# Patient Record
Sex: MALE | Race: WHITE | Employment: OTHER | URBAN - METROPOLITAN AREA
[De-identification: names, ages, dates, MRNs, and addresses within clinical notes are randomized per-mention and may not be internally consistent; named-entity substitution may affect disease eponyms.]

---

## 2017-07-17 ENCOUNTER — OFFICE VISIT (OUTPATIENT)
Dept: SLEEP MEDICINE | Facility: CLINIC | Age: 72
End: 2017-07-17
Payer: MEDICARE

## 2017-07-17 VITALS
DIASTOLIC BLOOD PRESSURE: 85 MMHG | BODY MASS INDEX: 31.74 KG/M2 | HEART RATE: 74 BPM | OXYGEN SATURATION: 96 % | SYSTOLIC BLOOD PRESSURE: 132 MMHG | TEMPERATURE: 98.6 F | WEIGHT: 234 LBS

## 2017-07-17 DIAGNOSIS — G47.33 OSA (OBSTRUCTIVE SLEEP APNEA): Primary | ICD-10-CM

## 2017-07-17 PROCEDURE — 99213 OFFICE O/P EST LOW 20 MIN: CPT | Performed by: INTERNAL MEDICINE

## 2017-07-17 NOTE — NURSING NOTE
Chief Complaint   Patient presents with     CPAP Follow Up     Follow up jen       Initial There were no vitals taken for this visit. Estimated body mass index is 35.03 kg/(m^2) as calculated from the following:    Height as of 6/16/15: 1.829 m (6').    Weight as of 6/25/15: 117.2 kg (258 lb 4.8 oz).  Medication Reconciliation: complete   ESS 7/24  Ina Saxena MA

## 2017-07-17 NOTE — PATIENT INSTRUCTIONS

## 2017-07-17 NOTE — MR AVS SNAPSHOT
After Visit Summary   7/17/2017    Martell Coelho    MRN: [id number]           Patient Information     Date Of Birth          1945        Visit Information        Provider Department      7/17/2017 12:30 PM Constantin Woody MD [facility]        Today's Diagnoses     MURRAY (obstructive sleep apnea)    -  1      Care Instructions      Your BMI is There is no height or weight on file to calculate BMI.  Weight management is a personal decision.  If you are interested in exploring weight loss strategies, the following discussion covers the approaches that may be successful. Body mass index (BMI) is one way to tell whether you are at a healthy weight, overweight, or obese. It measures your weight in relation to your height.  A BMI of 18.5 to 24.9 is in the healthy range. A person with a BMI of 25 to 29.9 is considered overweight, and someone with a BMI of 30 or greater is considered obese. More than two-thirds of American adults are considered overweight or obese.  Being overweight or obese increases the risk for further weight gain. Excess weight may lead to heart disease and diabetes.  Creating and following plans for healthy eating and physical activity may help you improve your health.  Weight control is part of healthy lifestyle and includes exercise, emotional health, and healthy eating habits. Careful eating habits lifelong are the mainstay of weight control. Though there are significant health benefits from weight loss, long-term weight loss with diet alone may be very difficult to achieve- studies show long-term success with dietary management in less than 10% of people. Attaining a healthy weight may be especially difficult to achieve in those with severe obesity. In some cases, medications, devices and surgical management might be considered.  What can you do?  If you are overweight or obese and are interested in methods for weight loss, you should discuss this with your provider.      Consider reducing daily calorie intake by 500 calories.     Keep a food journal.     Avoiding skipping meals, consider cutting portions instead.    Diet combined with exercise helps maintain muscle while optimizing fat loss. Strength training is particularly important for building and maintaining muscle mass. Exercise helps reduce stress, increase energy, and improves fitness. Increasing exercise without diet control, however, may not burn enough calories to loose weight.       Start walking three days a week 10-20 minutes at a time    Work towards walking thirty minutes five days a week     Eventually, increase the speed of your walking for 1-2 minutes at time    In addition, we recommend that you review healthy lifestyles and methods for weight loss available through the National Institutes of Health patient information sites:  http://win.niddk.nih.gov/publications/index.htm    And look into health and wellness programs that may be available through your health insurance provider, employer, local community center, or laura club.    Weight management plan: Patient was referred to their PCP to discuss a diet and exercise plan.        Your There is no height or weight on file to calculate BMI.  Weight management is a personal decision.  If you are interested in exploring weight loss strategies, the following discussion covers the approaches that may be successful. Body mass index (BMI) is one way to tell whether you are at a healthy weight, overweight, or obese. It measures your weight in relation to your height.  A BMI of 18.5 to 24.9 is in the healthy range. A person with a BMI of 25 to 29.9 is considered overweight, and someone with a BMI of 30 or greater is considered obese. More than two-thirds of American adults are considered overweight or obese.  Being overweight or obese increases the risk for further weight gain. Excess weight may lead to heart disease and diabetes.  Creating and following plans for  healthy eating and physical activity may help you improve your health.  Weight control is part of healthy lifestyle and includes exercise, emotional health, and healthy eating habits. Careful eating habits lifelong are the mainstay of weight control. Though there are significant health benefits from weight loss, long-term weight loss with diet alone may be very difficult to achieve- studies show long-term success with dietary management in less than 10% of people. Attaining a healthy weight may be especially difficult to achieve in those with severe obesity. In some cases, medications, devices and surgical management might be considered.  What can you do?  If you are overweight or obese and are interested in methods for weight loss, you should discuss this with your provider.     Consider reducing daily calorie intake by 500 calories.     Keep a food journal.     Avoiding skipping meals, consider cutting portions instead.    Diet combined with exercise helps maintain muscle while optimizing fat loss. Strength training is particularly important for building and maintaining muscle mass. Exercise helps reduce stress, increase energy, and improves fitness. Increasing exercise without diet control, however, may not burn enough calories to loose weight.       Start walking three days a week 10-20 minutes at a time    Work towards walking thirty minutes five days a week     Eventually, increase the speed of your walking for 1-2 minutes at time    In addition, we recommend that you review healthy lifestyles and methods for weight loss available through the National Institutes of Health patient information sites:  http://win.niddk.nih.gov/publications/index.htm    And look into health and wellness programs that may be available through your health insurance provider, employer, local community center, or laura club.    Weight management plan: Patient was referred to their PCP to discuss a diet and exercise plan.             Follow-ups after your visit        Your next 10 appointments already scheduled     Jul 27, 2017  8:30 PM CDT   PSG Split with BED 1 SH SLEEP   [facility])    [address] [facility] [address]  [facility] [id number] [phone]            Aug 08, 2017  1:45 PM CDT   LAB with LAB ONC [facility] ([facility])    [address] [facility] [address] [phone]           Patient must bring picture ID.  Patient should be prepared to give a urine specimen  Please do not eat 10-12 hours before your appointment if you are coming in fasting for labs on lipids, cholesterol, or glucose (sugar).  Pregnant women should follow their Care Team instructions. Water with medications is okay. Do not drink coffee or other fluids.   If you have concerns about taking  your medications, please ask at office or if scheduling via [employer], send a message by clicking on Secure Messaging, Message Your Care Team.            Aug 08, 2017  2:30 PM CDT   Return Visit with Rula Mo MD   [facility])    [address] [facility] [address] [phone]            Aug 09, 2017 12:30 PM CDT   Return Sleep Patient with Constantin Woody MD   [facility])    [address] [id number] [phone]              Future tests that were ordered for you today     Open Future Orders        Priority Expected Expires Ordered    Comprehensive Sleep Study Routine  1/13/2018 7/17/2017            Who to contact     If you have questions or need follow up information about today's clinic visit or your schedule please contact [facility] directly at [phone].  Normal or non-critical lab and imaging results will be communicated to you by MyChart, letter or phone within 4 business days after the clinic has  received the results. If you do not hear from us within 7 days, please contact the clinic through [address] or phone. If you have a critical or abnormal lab result, we will notify you by phone as soon as possible.  Submit refill requests through [address] or call your pharmacy and they will forward the refill request to us. Please allow 3 business days for your refill to be completed.          Additional Information About Your Visit        [employer] Information     [address] gives you secure access to your electronic health record. If you see a primary care provider, you can also send messages to your care team and make appointments. If you have questions, please call your primary care clinic.  If you do not have a primary care provider, please call [phone] and they will assist you.        Care EveryWhere ID     This is your Care EveryWhere ID. This could be used by other organizations to access your [facility] medical records  [id number]         Blood Pressure from Last 3 Encounters:   06/25/15 116/80   06/16/15 128/78   11/11/13 128/76    Weight from Last 3 Encounters:   06/25/15 117.2 kg (258 lb 4.8 oz)   06/16/15 117.5 kg (259 lb)   11/11/13 117.9 kg (260 lb)               Primary Care Provider Office Phone # Fax #    Javier Erin Adrian [phone]       [facility] [address] [place] [address] [address]  [facility] [address]        Equal Access to Services     JOSE RHOADES AH: Hadii aad ku hadasho Soomaali, waaxda luqadaha, qaybta kaalmada adeegyada, paramjit antunez hayjerichon rodney lassiter. So [facility] [phone].    ATENCIÓN: Si habla español, tiene a louis disposición servicios gratuitos de asistencia lingüística. Geni al [phone].    We comply with applicable federal civil rights laws and [place] laws. We do not discriminate on the basis of race, color, national origin, age, disability sex, sexual orientation or gender identity.            Thank you!     Thank you for choosing [facility]  [facility]  for your care. Our goal is always to provide you with excellent care. Hearing back from our patients is one way we can continue to improve our services. Please take a few minutes to complete the written survey that you may receive in the mail after your visit with us. Thank you!             Your Updated Medication List - Protect others around you: Learn how to safely use, store and throw away your medicines at www.disposemymeds.org.          This list is accurate as of: 7/17/17  1:19 PM.  Always use your most recent med list.                   Brand Name Dispense Instructions for use Diagnosis    aspirin 81 MG tablet      Take  by mouth daily.        CRESTOR 20 MG tablet   Generic drug:  rosuvastatin      Take 20 mg by mouth daily.        nitroGLYcerin 0.4 MG/SPRAY spray    NITROLINGUAL     Place 1 spray under the tongue every 5 minutes as needed.        OMEGA-3 FISH OIL PO      Take 1 g by mouth daily        omeprazole 20 MG CR capsule    priLOSEC     Take 1 capsule by mouth daily.        UNABLE TO FIND      Take 2.5 mg by mouth daily. MEDICATION NAME: Nebivolol HCl.

## 2017-07-17 NOTE — PROGRESS NOTES
Obstructive Sleep Apnea - PAP Follow-Up Visit:    Chief Complaint   Patient presents with     CPAP Follow Up     Follow up jen       Martell Coelho comes in today for follow-up of their moderate sleep apnea, managed with CPAP. His sleep study from 08/23/2013 had shown an AHI of 22.3 per hour and a minimum saturation of 79%. Patient's weight was 265 lbs at the time of sleep study.     Patient has been using CPAP regularly. Last year, during a hospital admission for MI in [facility], his CPAP device was found to have malfunctioned. He obtained a replacement ( DeVilbiss) device. He used this device until having his Resmed device fixed.  Patient uses therapy regularly. However, some night he wakes up with air hunger and removes the mask interface,     He has lost about 30 lbs since his original sleep study. He uses CPAP regularly and does not know if his sleep apnea symptoms have changed.     He brought a download from his DeVilbiss device which shows a normal residual AHI of 0.7 per hour. An examination of his other device also shows normal residual AHI at 0.1 per hour.       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    Impression/Plan:    Moderate sleep apnea.     Patient has lost 30 lbs since his original sleep study. He has some intolerance with CPAP but has used therapy consistently. He wants to reassess his sleep apnea considering weight loss. I agreed to a repeat PSG to reassess his sleep apnea and titrate CPAP as needed      Plan:     1. Split night PSG fr reassessment of sleep apnea after stanislaw loss    Martell Coelho will follow up in about 2 week(s) after sleep study .      Fifteen minutes spent with patient, all of which were spent face-to-face counseling, consulting, coordinating plan of care.      Constantin Woody MD, MD    CC:  Javier Adrian,

## 2017-07-27 ENCOUNTER — THERAPY VISIT (OUTPATIENT)
Dept: SLEEP MEDICINE | Facility: CLINIC | Age: 72
End: 2017-07-27
Payer: MEDICARE

## 2017-07-27 DIAGNOSIS — G47.33 OSA (OBSTRUCTIVE SLEEP APNEA): ICD-10-CM

## 2017-07-27 PROCEDURE — 95810 POLYSOM 6/> YRS 4/> PARAM: CPT | Performed by: INTERNAL MEDICINE

## 2017-07-27 NOTE — MR AVS SNAPSHOT
After Visit Summary   7/27/2017    Martell Coelho    MRN: [id number]           Patient Information     Date Of Birth          1945        Visit Information        Provider Department      7/27/2017 8:30 PM BED 1  SLEEP [facility]        Today's Diagnoses     MURRAY (obstructive sleep apnea)           Follow-ups after your visit        Your next 10 appointments already scheduled     Aug 08, 2017  1:45 PM CDT   LAB with LAB ONC [facility] ([facility])    [address] [place] [id number] [phone]           Patient must bring picture ID. Patient should be prepared to give a urine specimen  Please do not eat 10-12 hours before your appointment if you are coming in fasting for labs on lipids, cholesterol, or glucose (sugar). Pregnant women should follow their Care Team instructions. Water with medications is okay. Do not drink coffee or other fluids. If you have concerns about taking  your medications, please ask at office or if scheduling via [employer], send a message by clicking on Secure Messaging, Message Your Care Team.            Aug 08, 2017  2:30 PM CDT   Return Visit with Rula Mo MD   [facility])    [address] [place] [id number] [phone]            Aug 09, 2017 12:30 PM CDT   Return Sleep Patient with Constantin Woody MD   [facility]    [address] [place] [id number] [phone]              Who to contact     If you have questions or need follow up information about today's clinic visit or your schedule please contact [facility] directly at [phone].  Normal or non-critical lab and imaging results will be communicated to you by MyChart, letter or phone within 4 business days after the clinic has received the results. If you do not hear from us  within 7 days, please contact the clinic through [facility] or phone. If you have a critical or abnormal lab result, we will notify you by phone as soon as possible.  Submit refill requests through [facility] or call your pharmacy and they will forward the refill request to us. Please allow 3 business days for your refill to be completed.          Additional Information About Your Visit        [employer] Information     [facility] gives you secure access to your electronic health record. If you see a primary care provider, you can also send messages to your care team and make appointments. If you have questions, please call your primary care clinic.  If you do not have a primary care provider, please call [phone] and they will assist you.        Care EveryWhere ID     This is your Care EveryWhere ID. This could be used by other organizations to access your [facility] medical records  [id number]         Blood Pressure from Last 3 Encounters:   07/17/17 132/85   06/25/15 116/80   06/16/15 128/78    Weight from Last 3 Encounters:   07/17/17 106.1 kg (234 lb)   06/25/15 117.2 kg (258 lb 4.8 oz)   06/16/15 117.5 kg (259 lb)              We Performed the Following     Comprehensive Sleep Study        Primary Care Provider Office Phone # Fax #    Javier Erin Adrian [phone]       [facility] [address]   [facility] [address]        Equal Access to Services     [facility] Rosa[facility] : Hadii aad ku hadasho Soomaali, waaxda luqadaha, qaybta kaalmada adeegyada, paramjit antunez hayaidee christianson . So [facility] [phone].    ATENCIÓN: Si habla español, tiene a louis disposición servicios gratuitos de asistencia lingüística. Llame al [phone].    We comply with applicable federal civil rights laws and [place] laws. We do not discriminate on the basis of race, color, national origin, age, disability sex, sexual orientation or gender identity.            Thank you!     Thank you for choosing [facility]  for your care. Our goal is always to provide you with excellent care. Hearing back from our patients is one way we can continue to improve our services. Please take a few minutes to complete the written survey that you may receive in the mail after your visit with us. Thank you!             Your Updated Medication List - Protect others around you: Learn how to safely use, store and throw away your medicines at www.disposemymeds.org.          This list is accurate as of: 7/27/17 11:59 PM.  Always use your most recent med list.                   Brand Name Dispense Instructions for use Diagnosis    aspirin 81 MG tablet      Take  by mouth daily.        CRESTOR 20 MG tablet   Generic drug:  rosuvastatin      Take 20 mg by mouth daily.        nitroGLYcerin 0.4 MG/SPRAY spray    NITROLINGUAL     Place 1 spray under the tongue every 5 minutes as needed.        OMEGA-3 FISH OIL PO      Take 1 g by mouth daily        omeprazole 20 MG CR capsule    priLOSEC     Take 1 capsule by mouth daily.        UNABLE TO FIND      Take 2.5 mg by mouth daily. MEDICATION NAME: Nebivolol HCl.

## 2017-07-28 NOTE — PROGRESS NOTES
Diagnostic PSG completed per provider order.  Patient did not meet the lab initial criteria for PAP therapy.

## 2017-08-04 ENCOUNTER — DOCUMENTATION ONLY (OUTPATIENT)
Dept: LAB | Facility: CLINIC | Age: 72
End: 2017-08-04

## 2017-08-04 NOTE — PROCEDURES
SLEEP STUDY INTERPRETATION  POLYSOMNOGRAPHY REPORT      Patient: Martell Coelho  YOB: 1945  Study Date: 7/27/2017  MRN: [id number]  Referring Provider: MD Adrian Lawrence  Ordering Provider: MD Woody Rakesh    Indications for Polysomnography: The patient is a 71 y old Male who is 6' and weighs 258.0 lbs.  His BMI is 35.3, Geneva sleepiness scale 7.0 and neck size is 0.0.  Relevant medical history includes coronary artery disease, moderate MURRAY on CPAP . A diagnostic polysomnogram was performed to evaluate for reassessment of sleep apnea after weight loss.    Polysomnogram Data:  A full night polysomnogram recorded the standard physiologic parameters including EEG, EOG, EMG, ECG, nasal and oral airflow.  Respiratory parameters of chest and abdominal movements were recorded with respiratory inductance plethysmography.  Oxygen saturation was recorded by pulse oximetry.      Sleep Architecture: Fragmented sleep. Stage N3 was not obtained.   The total recording time of the polysomnogram was 489.4 minutes.  The total sleep time was 424.0 minutes.  Sleep latency was normal at 15.8 minutes without the use of a sleep aid.  REM latency was 48.0 minutes.  Arousal index was increased at 29.9 arousals per hour.  Sleep efficiency was normal at 86.6%.  Wake after sleep onset was 48.5 minutes.  The patient spent 28.1% of total sleep time in Stage N1, 49.5% in Stage N2, 0.0% in Stages N3, and 22.4% in REM.  Time in REM supine was 16.0 minutes.    Respiration: Mild obstructive sleep apnea     Events - The polysomnogram revealed a presence of 1 obstructive, 1 central, and - mixed apneas resulting in an apnea index of 0.3 events per hour.  There were 49 hypopneas resulting in a hypopnea index of 6.9 events per hour.  The combined apnea/hypopnea index was 7.2 events per hour.  The REM AHI was 13.9 events per hour.  The supine AHI was 4.3 events per hour.  The RERA index was 0.7 events per hour.   The RDI was 7.9  events per hour.    Snoring - was reported as moderate to loud.    Respiratory rate and pattern - was notable for normal respiratory rate and pattern.    Sustained Sleep Associated Hypoventilation - Transcutaneous carbon dioxide monitoring was not used, however significant hypoventilation was not suggested by oximetry.    Sleep Associated Hypoxemia - (Greater than 5 minutes O2 sat below 89%) was present.  Baseline oxygen saturation was 92.7%. Lowest oxygen saturation was 83.5%.  Time spent less than or equal to 88% was 2.2 minutes.  Time spent less than or equal to 89% was 6.8 minutes.  7.9 0.7 7.2     Movement Activity: There was an increased frequency of periodic limb movements of sleep and associated EEG arousals.     Periodic Limb Activity - There were 788 PLMs during the entire study. The PLM index was 111.5 movements per hour.  The PLM Arousal Index was 14.0 per hour.    REM EMG Activity - Excessive transient / sustained muscle activity was not present.    Nocturnal Behavior - Abnormal sleep related behaviors were not noted during / arising out of NREM / REM sleep.      Bruxism - None apparent.    Cardiac Summary: Sinus rhythm with frequent PACs and PVCs.   The average pulse rate was 67.9 bpm.  The minimum pulse rate was 34.9 bpm while the maximum pulse rate was 92.4 bpm. The rhythm is normal sinus. Arrhythmias were noted.    Assessment:     This sleep study which was performed for a reevaluation of sleep apnea after weight loss shows mild obstructive sleep apnea.     Recommendations:    Depending on clinical indications, CPAP therapy can be continued or alternative therapy with dental device can be considered.    Patient may be a candidate for dental appliance through referral to Sleep Dentistry for the treatment of obstructive sleep apnea.    If devices are not acceptable or effective, patient may benefit from evaluation of possible surgical options.  If he is interested, would recommend referral to  specialized ENT-Sleep provider.    Weight management.    Pharmacologic therapy should be used for management of restless legs syndrome only if present and clinically indicated and not based on the presence of periodic limb movements alone.    Cardiac Comments: -  Diagnostic Codes:     Obstructive Sleep Apnea G47.33    Periodic Limb Movement Disorder G47.61    Repetitive Intrusions Into Sleep F51.8    Sleep Hypoxemia G47.36      _________________________________   Constantin Woody MD 08/02/2017

## 2017-08-08 ENCOUNTER — ONCOLOGY VISIT (OUTPATIENT)
Dept: ONCOLOGY | Facility: CLINIC | Age: 72
End: 2017-08-08
Payer: MEDICARE

## 2017-08-08 VITALS
HEIGHT: 72 IN | RESPIRATION RATE: 15 BRPM | HEART RATE: 69 BPM | DIASTOLIC BLOOD PRESSURE: 71 MMHG | TEMPERATURE: 98.6 F | OXYGEN SATURATION: 96 % | WEIGHT: 233 LBS | BODY MASS INDEX: 31.56 KG/M2 | SYSTOLIC BLOOD PRESSURE: 125 MMHG

## 2017-08-08 DIAGNOSIS — G47.33 OSA (OBSTRUCTIVE SLEEP APNEA): Primary | ICD-10-CM

## 2017-08-08 DIAGNOSIS — D75.1 POLYCYTHEMIA: ICD-10-CM

## 2017-08-08 DIAGNOSIS — E61.1 IRON DEFICIENCY: ICD-10-CM

## 2017-08-08 DIAGNOSIS — G47.33 OSA (OBSTRUCTIVE SLEEP APNEA): ICD-10-CM

## 2017-08-08 LAB
BASOPHILS # BLD AUTO: 0.1 10E9/L (ref 0–0.2)
BASOPHILS NFR BLD AUTO: 0.7 %
DIFFERENTIAL METHOD BLD: NORMAL
EOSINOPHIL # BLD AUTO: 0.2 10E9/L (ref 0–0.7)
EOSINOPHIL NFR BLD AUTO: 2.1 %
ERYTHROCYTE [DISTWIDTH] IN BLOOD BY AUTOMATED COUNT: 14.8 % (ref 10–15)
FERRITIN SERPL-MCNC: 16 NG/ML (ref 26–388)
HCT VFR BLD AUTO: 47.1 % (ref 40–53)
HGB BLD-MCNC: 16 G/DL (ref 13.3–17.7)
IRON SATN MFR SERPL: 25 % (ref 15–46)
IRON SERPL-MCNC: 103 UG/DL (ref 35–180)
LYMPHOCYTES # BLD AUTO: 1.4 10E9/L (ref 0.8–5.3)
LYMPHOCYTES NFR BLD AUTO: 19.3 %
MCH RBC QN AUTO: 28.9 PG (ref 26.5–33)
MCHC RBC AUTO-ENTMCNC: 34 G/DL (ref 31.5–36.5)
MCV RBC AUTO: 85 FL (ref 78–100)
MONOCYTES # BLD AUTO: 0.6 10E9/L (ref 0–1.3)
MONOCYTES NFR BLD AUTO: 9 %
NEUTROPHILS # BLD AUTO: 4.9 10E9/L (ref 1.6–8.3)
NEUTROPHILS NFR BLD AUTO: 68.9 %
PLATELET # BLD AUTO: 170 10E9/L (ref 150–450)
RBC # BLD AUTO: 5.53 10E12/L (ref 4.4–5.9)
TIBC SERPL-MCNC: 405 UG/DL (ref 240–430)
WBC # BLD AUTO: 7.2 10E9/L (ref 4–11)

## 2017-08-08 PROCEDURE — 36415 COLL VENOUS BLD VENIPUNCTURE: CPT | Performed by: INTERNAL MEDICINE

## 2017-08-08 PROCEDURE — 83540 ASSAY OF IRON: CPT | Performed by: INTERNAL MEDICINE

## 2017-08-08 PROCEDURE — 85025 COMPLETE CBC W/AUTO DIFF WBC: CPT | Performed by: INTERNAL MEDICINE

## 2017-08-08 PROCEDURE — 83550 IRON BINDING TEST: CPT | Performed by: INTERNAL MEDICINE

## 2017-08-08 PROCEDURE — 82728 ASSAY OF FERRITIN: CPT | Performed by: INTERNAL MEDICINE

## 2017-08-08 PROCEDURE — 99214 OFFICE O/P EST MOD 30 MIN: CPT | Performed by: INTERNAL MEDICINE

## 2017-08-08 ASSESSMENT — PAIN SCALES - GENERAL: PAINLEVEL: NO PAIN (0)

## 2017-08-08 NOTE — PROGRESS NOTES
Hematology Follow-up visit:  Date on this visit: 8/8/2017    Primary Care Physician: Marli Joyner.  Diagnosis: Erythrocytosis    History Of Present Illness:  Mr. Coelho is a 71 year old male who presents for f/up eythrocytosis. He has not followed up in over two years. He transferred his care to me in June 2015, due to Dr. Barber leaving our practice. He has originally met with Dr. Barber in 07/2013 when he was evaluated for erythrocytosis. He is originally from [facility] but his daughter lives in the  but he spents a fair amout of time here too. He reports that in 2013 in [facility] he was noted to have Hb ede 18-20 range and underwent three phlebotomies every other day, 500 cc removed each time. These were associated with lightheadedness. When he was seen by Dr. Barber his Hb was noraml at 16.2 g/dl, HCT of 46.2. WBC was 5.5 with normal absolute differential counts and platelet count was 182. LFTS were normal, ferritin was 39. TSH was normal, LDH was normal. PSA was 0.42 and DAVID 2 mutation analysis was negative. At that time he was also diagnosed with MURRAY and his erythrocytosis was felt to be secondary to MURRAY and no phlebotomy was recommended. However, he went back to [facility] and in 2015 he was still having monthly phlebotomies to keep his HCT down although it sounds like HCT was in the mid 40s there. When we saw him in June 2015, his serum erythropoietin level was normal at 27. Hb was 14.1 and ferritin was low at 11. CT abdomen 6/18/2015 showed colonic diverticulosis without inflammatory change and elevation of L hemidiaphragm. Adrenals, spleen and pancreas and liver appeared normal.He has a Hx of CAD, s/p PTCIx2. He is active and exercises daily and swims. In June 2015,  have recommended that he discontinue phlebotomy sessions in [facility] and have his CBCd checked annually. He reports having an EGD in [facility] in May 2016 when after the biopsy he had an UGI bleed. He required repeat EGD and cauterization of bleeding area,  per patient. His Hb and ferritin fell per patient, and he was given 5 infusions of Monofer in [facility], he believes 100 mg each, IV. He felt better following the infusions. He has not been back to the [place] for the past two years, until now.  He has a Hx of BPH, s/p TURP in [facility] and here has been following up with Dr. Infante. He followed with Dr. Infante's group in the last month per patient and had a prostate US.  PSA was normal per patient.  He is already on ASA 81 mg PO daily.   He has a Hx of chronic pancreatitis in the past. He has had a cholecystectomy in the past. He has occasional hard stools, but no diarrhea.   He had a colonoscopy on 8/3/2017 - which showed Diverticulosis of colon and repeat was recommended in 10 years. This was in [facility]. Outside medical records were reviewed.   He has a Hx of diverticulits, recurrent x3 in the past. He supplemens his diet with fiber.   He is a former smoker. He wanted to see if he is still iron deficient. He has remained on CPAP for sleep apnea. He reports he had another CPAP study last week and will be seeing a pulmonologist for results to see if he still needs CPAP.  His daughter lives in the [place].  In addition, a complete 12 point  review of systems is negative.    Past Medical/Surgical History:  CAD, s/p PTCI  MURRAY  Secondary erythrocytosis  BPH   FHx and Soc Hx reviewed      Past Surgical History:   Procedure Laterality Date     APPENDECTOMY       CYSTOSCOPY, TRANSURETHRAL RESECTION (TUR) PROSTATE, COMBINED     Cholecystectomy    Allergies:  Allergies as of 08/08/2017     (No Known Allergies)     Current Medications:  Current Outpatient Prescriptions   Medication Sig Dispense Refill     Omega-3 Fatty Acids (OMEGA-3 FISH OIL PO) Take 1 g by mouth daily       nitroglycerin (NITROLINGUAL) 0.4 MG/SPRAY spray Place 1 spray under the tongue every 5 minutes as needed.       omeprazole (PRILOSEC) 20 MG capsule Take 1 capsule by mouth daily.       UNABLE TO FIND  "Take 2.5 mg by mouth daily. MEDICATION NAME: Nebivolol HCl.        aspirin 81 MG tablet Take  by mouth daily.       rosuvastatin (CRESTOR) 20 MG tablet Take 20 mg by mouth daily.          Physical Exam:  /71  Pulse 69  Temp 98.6  F (37  C)  Resp 15  Ht 1.829 m (6' 0.01\")  Wt 105.7 kg (233 lb)  SpO2 96%  BMI 31.59 kg/m2      GENERAL APPEARANCE: healthy, alert and no distress     NECK: no adenopathy, no asymmetry or masses     RESP: lungs clear to auscultation - no rales, rhonchi or wheezes     CARDIOVASCULAR: regular rates and rhythm, normal S1 S2, no S3 or S4 and no murmur.     ABDOMEN:  soft, nontender, no HSM or masses and bowel sounds normal     MUSCULOSKELETAL: extremities normal- no gross deformities noted, no evidence of inflammation in joints, FROM in all extremities. No edema b/l LE.     SKIN: no suspicious lesions or rashes     PSYCHIATRIC: mentation appears normal and affect normal    Laboratory/Imaging Studies  Results for orders placed or performed in visit on 08/08/17   CBC with platelets differential   Result Value Ref Range    WBC 7.2 4.0 - 11.0 10e9/L    RBC Count 5.53 4.4 - 5.9 10e12/L    Hemoglobin 16.0 13.3 - 17.7 g/dL    Hematocrit 47.1 40.0 - 53.0 %    MCV 85 78 - 100 fl    MCH 28.9 26.5 - 33.0 pg    MCHC 34.0 31.5 - 36.5 g/dL    RDW 14.8 10.0 - 15.0 %    Platelet Count 170 150 - 450 10e9/L    Diff Method Automated Method     % Neutrophils 68.9 %    % Lymphocytes 19.3 %    % Monocytes 9.0 %    % Eosinophils 2.1 %    % Basophils 0.7 %    Absolute Neutrophil 4.9 1.6 - 8.3 10e9/L    Absolute Lymphocytes 1.4 0.8 - 5.3 10e9/L    Absolute Monocytes 0.6 0.0 - 1.3 10e9/L    Absolute Eosinophils 0.2 0.0 - 0.7 10e9/L    Absolute Basophils 0.1 0.0 - 0.2 10e9/L   Ferritin   Result Value Ref Range    Ferritin 16 (L) 26 - 388 ng/mL   Iron and iron binding capacity   Result Value Ref Range    Iron 103 35 - 180 ug/dL    Iron Binding Cap 405 240 - 430 ug/dL    Iron Saturation Index 25 15 - 46 % "     ASSESSMENT/PLAN:    Mr. 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 He has remained on  CPAP. Hb is normal but ferritin is low but he reports an UGI bleed in 2016 in [facility], followed by 5 doses of IV iron (Monofer, 100 mg each dose, per patient). He is feeling well presently.   1. Secondary erythrocytosis, resolved. Cont CPAP for sleep apnea and f/up with sleep medicine.  2. Iron deficiency- I recommended that he start on ferrous iron supplementation 325 mg PO at least daily for the next 6-12 months and that should normalize his iron stores. I would recommend he has his iron studies then repeated in about 6 months. We discussed potential GI side effects of oral iron including constipation, d/w patient. He is also considering a repeat course of IV iron when he returns to [facility]. He has been seeing a hematologist there.  3. BPH- f/up with urology.    At the end of our visit patient verbalized understanding and concurred with the plan.

## 2017-08-08 NOTE — MR AVS SNAPSHOT
After Visit Summary   8/8/2017    Martell Coelho    MRN: [id number]           Patient Information     Date Of Birth          1945        Visit Information        Provider Department      8/8/2017 2:30 PM Rula Mo MD [facility]        Today's Diagnoses     MURRAY (obstructive sleep apnea)    -  1    Iron deficiency           Follow-ups after your visit        Your next 10 appointments already scheduled     Aug 09, 2017 12:30 PM CDT   Return Sleep Patient with Constantin Woody MD   [facility])    [address] [place] [id number] [phone]              Who to contact     If you have questions or need follow up information about today's clinic visit or your schedule please contact [facility] directly at [phone].  Normal or non-critical lab and imaging results will be communicated to you by [employer], letter or phone within 4 business days after the clinic has received the results. If you do not hear from us within 7 days, please contact the clinic through [employer] or phone. If you have a critical or abnormal lab result, we will notify you by phone as soon as possible.  Submit refill requests through [employer] or call your pharmacy and they will forward the refill request to us. Please allow 3 business days for your refill to be completed.          Additional Information About Your Visit        [employer] Information     [employer] gives you secure access to your electronic health record. If you see a primary care provider, you can also send messages to your care team and make appointments. If you have questions, please call your primary care clinic.  If you do not have a primary care provider, please call [phone] and they will assist you.      [employer] is an electronic gateway that provides easy, online access to your medical records. With [employer], you can request a clinic appointment, read  "your test results, renew a prescription or communicate with your care team.     To access your existing account, please contact your [facility] or call [phone] for assistance.        Care EveryWhere ID     This is your Care EveryWhere ID. This could be used by other organizations to access your [facility] medical records  [id number]        Your Vitals Were     Pulse Temperature Respirations Height Pulse Oximetry BMI (Body Mass Index)    69 98.6  F (37  C) 15 1.829 m (6' 0.01\") 96% 31.59 kg/m2       Blood Pressure from Last 3 Encounters:   08/08/17 125/71   07/17/17 132/85   06/25/15 116/80    Weight from Last 3 Encounters:   08/08/17 105.7 kg (233 lb)   07/17/17 106.1 kg (234 lb)   06/25/15 117.2 kg (258 lb 4.8 oz)              Today, you had the following     No orders found for display       Primary Care Provider Office Phone # Fax #    Javier Erin Adrian [phone]       [facility] [address]   [facility] [address]        Equal Access to Services     MICHAEL RHOADES : Hadii aad ku hadasho Soomaali, waaxda luqadaha, qaybta kaalmada adeegyada, waxay idiin hayaan adeeg kharacleopatra la'aan ah. So [facility] [phone].    ATENCIÓN: Si habla español, tiene a louis disposición servicios gratuitos de asistencia lingüística. Llame al [phone].    We comply with applicable federal civil rights laws and [place] laws. We do not discriminate on the basis of race, color, national origin, age, disability sex, sexual orientation or gender identity.            Thank you!     Thank you for choosing [facility]  for your care. Our goal is always to provide you with excellent care. Hearing back from our patients is one way we can continue to improve our services. Please take a few minutes to complete the written survey that you may receive in the mail after your visit with us. Thank you!             Your Updated Medication List - Protect others around " you: Learn how to safely use, store and throw away your medicines at www.disposemymeds.org.          This list is accurate as of: 8/8/17  3:51 PM.  Always use your most recent med list.                   Brand Name Dispense Instructions for use Diagnosis    aspirin 81 MG tablet      Take  by mouth daily.        CRESTOR 20 MG tablet   Generic drug:  rosuvastatin      Take 20 mg by mouth daily.        MAGNESIUM OXIDE PO           nitroGLYcerin 0.4 MG/SPRAY spray    NITROLINGUAL     Place 1 spray under the tongue every 5 minutes as needed.        OMEGA-3 FISH OIL PO      Take 1 g by mouth daily    Polycythemia, MURRAY (obstructive sleep apnea)       omeprazole 20 MG CR capsule    priLOSEC     Take 1 capsule by mouth daily.        RANEXA PO           UNABLE TO FIND      Take 2.5 mg by mouth daily. MEDICATION NAME: Nebivolol HCl.

## 2017-08-08 NOTE — NURSING NOTE
"Oncology Rooming Note    August 8, 2017 2:32 PM   Martell Coelho is a 71 year old male who presents for:    Chief Complaint   Patient presents with     Oncology Clinic Visit     follow up     Initial Vitals: /71  Pulse 69  Temp 98.6  F (37  C)  Resp 15  Ht 1.829 m (6' 0.01\")  Wt 105.7 kg (233 lb)  SpO2 96%  BMI 31.59 kg/m2 Estimated body mass index is 31.59 kg/(m^2) as calculated from the following:    Height as of this encounter: 1.829 m (6' 0.01\").    Weight as of this encounter: 105.7 kg (233 lb). Body surface area is 2.32 meters squared.  No Pain (0) Comment: Data Unavailable   No LMP for male patient.  Allergies reviewed: Yes  Medications reviewed: Yes    Medications: Medication refills not needed today.  Pharmacy name entered into EPIC: Data Unavailable        5 minutes for nursing intake (face to face time)     Elizabeth Horner LPN              "

## 2017-08-09 ENCOUNTER — OFFICE VISIT (OUTPATIENT)
Dept: SLEEP MEDICINE | Facility: CLINIC | Age: 72
End: 2017-08-09
Payer: MEDICARE

## 2017-08-09 VITALS
HEIGHT: 71 IN | HEART RATE: 71 BPM | RESPIRATION RATE: 16 BRPM | DIASTOLIC BLOOD PRESSURE: 79 MMHG | BODY MASS INDEX: 32.87 KG/M2 | WEIGHT: 234.8 LBS | OXYGEN SATURATION: 98 % | SYSTOLIC BLOOD PRESSURE: 115 MMHG

## 2017-08-09 DIAGNOSIS — G47.33 OSA (OBSTRUCTIVE SLEEP APNEA): Primary | ICD-10-CM

## 2017-08-09 PROCEDURE — 99213 OFFICE O/P EST LOW 20 MIN: CPT | Performed by: INTERNAL MEDICINE

## 2017-08-09 NOTE — PROGRESS NOTES
Sleep Study Follow-Up Visit:    Date on this visit: 8/9/2017    Martell Coelho comes in today for follow-up of his sleep study done on 7/27/2017 at the [facility] for reassessment of sleep apnea after weight loss.    Sleep latency 15 minutes without Ambien.  REM achieved.   REM latency 48 minutes.  Sleep efficiency 86%. Total sleep time 424 minutes.    Sleep architecture:  Stage 1, 28% (5%), stage 2, 49.5% (45-55%), stage 3, 0% (15-20%), stage REM, 22.4% (20-25%).  AHI was 7.2, with desaturations down to 83%. RDI 7.9.  REM AHI 13.9, consistent with mild REM MURRAY.  Supine AHI 4.3, consistent with no SUPINE MURRAY.  Periodic Limb Movement Index 111.5/hour.       These findings were reviewed with patient.     Past medical/surgical history, family history, social history, medications and allergies were reviewed.      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 Obstructive sleep apnea     - Patient was counseled regarding current level of sleep apnea. Weight loss has decreased sleep apnea to a mild level compared to premorbid moderate sleep apnea.     - We discussed dental appliance therapy or continuing CPAP vs., no treatment. Patent decides to continue with CPAP.     - Patient was made aware of EKG abnormalities which he will follow up with cardiology.     - Patient denies restless leg symptoms and is not interested pharmacotherapy for PLMs.     Plan:     1. Continue auto PAP therapy     He will follow up with me in about 1 year(s).     Fifteen minutes spent with patient, all of which were spent face-to-face counseling, consulting, coordinating plan of care.      Constantin Woody,  MD    CC: Javier Adrian

## 2017-08-09 NOTE — NURSING NOTE
"Chief Complaint   Patient presents with     Study Results     psg f/u       Initial /79  Pulse 71  Resp 16  Ht 1.803 m (5' 11\")  Wt 106.5 kg (234 lb 12.8 oz)  SpO2 98%  BMI 32.75 kg/m2 Estimated body mass index is 32.75 kg/(m^2) as calculated from the following:    Height as of this encounter: 1.803 m (5' 11\").    Weight as of this encounter: 106.5 kg (234 lb 12.8 oz).  Medication Reconciliation: complete     ESS 2  Sara Tucker    "

## 2017-08-09 NOTE — MR AVS SNAPSHOT
After Visit Summary   8/9/2017    Martell Coelho    MRN: [id number]           Patient Information     Date Of Birth          1945        Visit Information        Provider Department      8/9/2017 12:30 PM Constantin Woody MD [facility]        Today's Diagnoses     MURRAY (obstructive sleep apnea)    -  1      Care Instructions      Your BMI is Body mass index is 32.75 kg/(m^2).  Weight management is a personal decision.  If you are interested in exploring weight loss strategies, the following discussion covers the approaches that may be successful. Body mass index (BMI) is one way to tell whether you are at a healthy weight, overweight, or obese. It measures your weight in relation to your height.  A BMI of 18.5 to 24.9 is in the healthy range. A person with a BMI of 25 to 29.9 is considered overweight, and someone with a BMI of 30 or greater is considered obese. More than two-thirds of American adults are considered overweight or obese.  Being overweight or obese increases the risk for further weight gain. Excess weight may lead to heart disease and diabetes.  Creating and following plans for healthy eating and physical activity may help you improve your health.  Weight control is part of healthy lifestyle and includes exercise, emotional health, and healthy eating habits. Careful eating habits lifelong are the mainstay of weight control. Though there are significant health benefits from weight loss, long-term weight loss with diet alone may be very difficult to achieve- studies show long-term success with dietary management in less than 10% of people. Attaining a healthy weight may be especially difficult to achieve in those with severe obesity. In some cases, medications, devices and surgical management might be considered.  What can you do?  If you are overweight or obese and are interested in methods for weight loss, you should discuss this with your provider.     Consider reducing  daily calorie intake by 500 calories.     Keep a food journal.     Avoiding skipping meals, consider cutting portions instead.    Diet combined with exercise helps maintain muscle while optimizing fat loss. Strength training is particularly important for building and maintaining muscle mass. Exercise helps reduce stress, increase energy, and improves fitness. Increasing exercise without diet control, however, may not burn enough calories to loose weight.       Start walking three days a week 10-20 minutes at a time    Work towards walking thirty minutes five days a week     Eventually, increase the speed of your walking for 1-2 minutes at time    In addition, we recommend that you review healthy lifestyles and methods for weight loss available through the National Institutes of Health patient information sites:  http://win.niddk.nih.gov/publications/index.htm    And look into health and wellness programs that may be available through your health insurance provider, employer, local community center, or laura club.    Weight management plan: Patient was referred to their PCP to discuss a diet and exercise plan.            Follow-ups after your visit        Who to contact     If you have questions or need follow up information about today's clinic visit or your schedule please contact [facility] directly at [phone].  Normal or non-critical lab and imaging results will be communicated to you by MyChart, letter or phone within 4 business days after the clinic has received the results. If you do not hear from us within 7 days, please contact the clinic through [employer] or phone. If you have a critical or abnormal lab result, we will notify you by phone as soon as possible.  Submit refill requests through [employer] or call your pharmacy and they will forward the refill request to us. Please allow 3 business days for your refill to be completed.          Additional Information About Your Visit       "  MyChart Information     [employer] gives you secure access to your electronic health record. If you see a primary care provider, you can also send messages to your care team and make appointments. If you have questions, please call your primary care clinic.  If you do not have a primary care provider, please call [phone] and they will assist you.        Care EveryWhere ID     This is your Care EveryWhere ID. This could be used by other organizations to access your [place] medical records  [id number]        Your Vitals Were     Pulse Respirations Height Pulse Oximetry BMI (Body Mass Index)       71 16 1.803 m (5' 11\") 98% 32.75 kg/m2        Blood Pressure from Last 3 Encounters:   08/09/17 115/79   08/08/17 125/71   07/17/17 132/85    Weight from Last 3 Encounters:   08/09/17 106.5 kg (234 lb 12.8 oz)   08/08/17 105.7 kg (233 lb)   07/17/17 106.1 kg (234 lb)              Today, you had the following     No orders found for display       Primary Care Provider Office Phone # Fax #    Javier Erin Adrian [phone]       [facility] [address]   [facility] [address]        Equal Access to Services     JOSE RHOADES : Hadii aad ku hadasho Soomaali, waaxda luqadaha, qaybta kaalmada adeegyada, waxay idiin hayaan ademedina galvan la'aidee lassiter. So [facility] [phone].    ATENCIÓN: Si habla español, tiene a louis disposición servicios gratuitos de asistencia lingüística. Llame al [phone].    We comply with applicable federal civil rights laws and [place] laws. We do not discriminate on the basis of race, color, national origin, age, disability sex, sexual orientation or gender identity.            Thank you!     Thank you for choosing [facility]  for your care. Our goal is always to provide you with excellent care. Hearing back from our patients is one way we can continue to improve our services. Please take a few minutes to complete the written survey that you may receive " in the mail after your visit with us. Thank you!             Your Updated Medication List - Protect others around you: Learn how to safely use, store and throw away your medicines at www.disposemymeds.org.          This list is accurate as of: 8/9/17 12:59 PM.  Always use your most recent med list.                   Brand Name Dispense Instructions for use Diagnosis    aspirin 81 MG tablet      Take  by mouth daily.        CRESTOR 20 MG tablet   Generic drug:  rosuvastatin      Take 20 mg by mouth daily.        MAGNESIUM OXIDE PO           nitroGLYcerin 0.4 MG/SPRAY spray    NITROLINGUAL     Place 1 spray under the tongue every 5 minutes as needed.        OMEGA-3 FISH OIL PO      Take 1 g by mouth daily    Polycythemia, MURRAY (obstructive sleep apnea)       omeprazole 20 MG CR capsule    priLOSEC     Take 1 capsule by mouth daily.        RANEXA PO           UNABLE TO FIND      Take 2.5 mg by mouth daily. MEDICATION NAME: Nebivolol HCl.

## 2017-08-09 NOTE — PATIENT INSTRUCTIONS

## 2017-08-11 ENCOUNTER — CARE COORDINATION (OUTPATIENT)
Dept: ONCOLOGY | Facility: CLINIC | Age: 72
End: 2017-08-11

## 2017-08-11 NOTE — PROGRESS NOTES
Patient called stating he is a patient of Dr. Mo's just recently seen in our clinic. He was planning to travel to [facility] in the next few days but has delayed his trip until September 9.  He states during his visit it was recommended that he have iron infusion while he was in [facility] (he has done this before) but now is asking if he should have it here prior to his departure.  Writer will discuss with MD and get back to patient with plan.

## 2017-08-15 NOTE — PROGRESS NOTES
Writer called patient with recommendations from Dr. Mo:  She does not feel he needs IV iron - she would recommend oral iron supplementation - Ferrous sulfate 325 mg daily.  However, patient states this makes him constipated and he probably will not use it. He was very appreciative of Dr. Mo's input.

## 2018-10-03 ENCOUNTER — OFFICE VISIT (OUTPATIENT)
Dept: SLEEP MEDICINE | Facility: CLINIC | Age: 73
End: 2018-10-03
Payer: MEDICARE

## 2018-10-03 VITALS
RESPIRATION RATE: 16 BRPM | HEIGHT: 70 IN | BODY MASS INDEX: 33.93 KG/M2 | OXYGEN SATURATION: 96 % | WEIGHT: 237 LBS | HEART RATE: 73 BPM | SYSTOLIC BLOOD PRESSURE: 125 MMHG | TEMPERATURE: 97.5 F | DIASTOLIC BLOOD PRESSURE: 85 MMHG

## 2018-10-03 DIAGNOSIS — G47.33 OSA (OBSTRUCTIVE SLEEP APNEA): Primary | ICD-10-CM

## 2018-10-03 PROCEDURE — 99213 OFFICE O/P EST LOW 20 MIN: CPT | Performed by: INTERNAL MEDICINE

## 2018-10-03 NOTE — NURSING NOTE
"Chief Complaint   Patient presents with     CPAP Follow Up       Initial /85  Pulse 73  Temp 97.5  F (36.4  C) (Oral)  Resp 16  Ht 1.778 m (5' 10\")  Wt 107.5 kg (237 lb)  SpO2 96%  BMI 34.01 kg/m2 Estimated body mass index is 34.01 kg/(m^2) as calculated from the following:    Height as of this encounter: 1.778 m (5' 10\").    Weight as of this encounter: 107.5 kg (237 lb).    Medication Reconciliation: complete     ESS   Sara Tucker      "

## 2018-10-03 NOTE — PATIENT INSTRUCTIONS

## 2018-10-03 NOTE — PROGRESS NOTES
"    Obstructive Sleep Apnea - PAP Follow-Up Visit:    Chief Complaint   Patient presents with     CPAP Follow Up       Martell Coelho comes in today for follow-up of their mild sleep apnea, managed with CPAP.     PSG from 7/27/2017 showed an AHI of 7.2 per hour. PSG in 2013 had shown AHI of 22.3 per hour.  Weight was 265 lbs and 258 lbs was     Overall, he rates the experience with PAP as 10 (0 poor, 10 great). The mask is comfortable. The mask is not leaking.  He is not snoring with the mask on. He is not having gasp arousals.  He is not having significant oral/nasal dryness. The pressure settings are comfortable.     He uses nasal mask.     Bedtime is typically 10 pm. Usually it takes about 10 minutes to fall asleep with the mask on. Wake time is typically 6 am.  Patient is using PAP therapy 7 hours per night. The patient is usually getting 7 hours of sleep per night.    He does feel rested in the morning.    No Data Recorded    ResMed     Auto-PAP 6-11 cmH2O download:  22/90 total days of use. 68 nonuse days. 16% days with >4 hours use.  Average use 4 hours 1 minutes per day. CPAP 95% pressure 10.5 cm. 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   Coronary artery disease     - Tolerating PAP well. Daytime symptoms are stable. Normal AHI noted on download. He is due for a replacement of his CPAP device ad I have placed an order for a new device. "     Plan:     1. Continue auto PAP therapy     Martell Coelho will follow up in about 1 year(s).     Fifteen minutes spent with patient, all of which were spent face-to-face counseling, consulting, coordinating plan of care.      Constantin Woody MD, MD    CC:  Javier Adrian,

## 2018-10-03 NOTE — MR AVS SNAPSHOT
After Visit Summary   10/3/2018    Martell Coelho    MRN: [id number]           Patient Information     Date Of Birth          1945        Visit Information        Provider Department      10/3/2018 1:30 PM Constantin Woody MD [facility] [place]        Today's Diagnoses     MURRAY (obstructive sleep apnea)    -  1      Care Instructions      Your BMI is Body mass index is 34.01 kg/(m^2).  Weight management is a personal decision.  If you are interested in exploring weight loss strategies, the following discussion covers the approaches that may be successful. Body mass index (BMI) is one way to tell whether you are at a healthy weight, overweight, or obese. It measures your weight in relation to your height.  A BMI of 18.5 to 24.9 is in the healthy range. A person with a BMI of 25 to 29.9 is considered overweight, and someone with a BMI of 30 or greater is considered obese. More than two-thirds of American adults are considered overweight or obese.  Being overweight or obese increases the risk for further weight gain. Excess weight may lead to heart disease and diabetes.  Creating and following plans for healthy eating and physical activity may help you improve your health.  Weight control is part of healthy lifestyle and includes exercise, emotional health, and healthy eating habits. Careful eating habits lifelong are the mainstay of weight control. Though there are significant health benefits from weight loss, long-term weight loss with diet alone may be very difficult to achieve- studies show long-term success with dietary management in less than 10% of people. Attaining a healthy weight may be especially difficult to achieve in those with severe obesity. In some cases, medications, devices and surgical management might be considered.  What can you do?  If you are overweight or obese and are interested in methods for weight loss, you should discuss this with your provider.     Consider  reducing daily calorie intake by 500 calories.     Keep a food journal.     Avoiding skipping meals, consider cutting portions instead.    Diet combined with exercise helps maintain muscle while optimizing fat loss. Strength training is particularly important for building and maintaining muscle mass. Exercise helps reduce stress, increase energy, and improves fitness. Increasing exercise without diet control, however, may not burn enough calories to loose weight.       Start walking three days a week 10-20 minutes at a time    Work towards walking thirty minutes five days a week     Eventually, increase the speed of your walking for 1-2 minutes at time    In addition, we recommend that you review healthy lifestyles and methods for weight loss available through the National Institutes of Health patient information sites:  http://win.niddk.nih.gov/publications/index.htm    And look into health and wellness programs that may be available through your health insurance provider, employer, local community center, or laura club.    Weight management plan: Patient was referred to their PCP to discuss a diet and exercise plan.        Your Body mass index is 34.01 kg/(m^2).  Weight management is a personal decision.  If you are interested in exploring weight loss strategies, the following discussion covers the approaches that may be successful. Body mass index (BMI) is one way to tell whether you are at a healthy weight, overweight, or obese. It measures your weight in relation to your height.  A BMI of 18.5 to 24.9 is in the healthy range. A person with a BMI of 25 to 29.9 is considered overweight, and someone with a BMI of 30 or greater is considered obese. More than two-thirds of American adults are considered overweight or obese.  Being overweight or obese increases the risk for further weight gain. Excess weight may lead to heart disease and diabetes.  Creating and following plans for healthy eating and physical activity  may help you improve your health.  Weight control is part of healthy lifestyle and includes exercise, emotional health, and healthy eating habits. Careful eating habits lifelong are the mainstay of weight control. Though there are significant health benefits from weight loss, long-term weight loss with diet alone may be very difficult to achieve- studies show long-term success with dietary management in less than 10% of people. Attaining a healthy weight may be especially difficult to achieve in those with severe obesity. In some cases, medications, devices and surgical management might be considered.  What can you do?  If you are overweight or obese and are interested in methods for weight loss, you should discuss this with your provider.     Consider reducing daily calorie intake by 500 calories.     Keep a food journal.     Avoiding skipping meals, consider cutting portions instead.    Diet combined with exercise helps maintain muscle while optimizing fat loss. Strength training is particularly important for building and maintaining muscle mass. Exercise helps reduce stress, increase energy, and improves fitness. Increasing exercise without diet control, however, may not burn enough calories to loose weight.       Start walking three days a week 10-20 minutes at a time    Work towards walking thirty minutes five days a week     Eventually, increase the speed of your walking for 1-2 minutes at time    In addition, we recommend that you review healthy lifestyles and methods for weight loss available through the National Institutes of Health patient information sites:  http://win.niddk.nih.gov/publications/index.htm    And look into health and wellness programs that may be available through your health insurance provider, employer, local community center, or laura club.    Weight management plan: Patient was referred to their PCP to discuss a diet and exercise plan.          Follow-ups after your visit        Your  next 10 appointments already scheduled     Oct 08, 2018 11:00 AM CDT   LAB with LAB ONC [facility] ([facility])    [address] [address] [place] [id number] [phone]           Please do not eat 10-12 hours before your appointment if you are coming in fasting for labs on lipids, cholesterol, or glucose (sugar). This does not apply to pregnant women. Water, hot tea and black coffee (with nothing added) are okay. Do not drink other fluids, diet soda or chew gum.            Oct 11, 2018  4:00 PM CDT   Return Visit with Rula Mo MD   [facility] ([facility])    [address] [address] [facility] [id number] [phone]              Who to contact     If you have questions or need follow up information about today's clinic visit or your schedule please contact [facility] directly at [phone].  Normal or non-critical lab and imaging results will be communicated to you by idiohart, letter or phone within 4 business days after the clinic has received the results. If you do not hear from us within 7 days, please contact the clinic through Estimotet or phone. If you have a critical or abnormal lab result, we will notify you by phone as soon as possible.  Submit refill requests through Hand Talk or call your pharmacy and they will forward the refill request to us. Please allow 3 business days for your refill to be completed.          Additional Information About Your Visit        Hand Talk Information     Hand Talk gives you secure access to your electronic health record. If you see a primary care provider, you can also send messages to your care team and make appointments. If you have questions, please call your primary care clinic.  If you do not have a primary care provider, please call [phone] and they will assist you.        Care EveryWhere ID     This is your Care EveryWhere ID. This could be used by  "other organizations to access your [place] medical records  [id number]        Your Vitals Were     Pulse Temperature Respirations Height Pulse Oximetry BMI (Body Mass Index)    73 97.5  F (36.4  C) (Oral) 16 1.778 m (5' 10\") 96% 34.01 kg/m2       Blood Pressure from Last 3 Encounters:   10/03/18 125/85   08/09/17 115/79   08/08/17 125/71    Weight from Last 3 Encounters:   10/03/18 107.5 kg (237 lb)   08/09/17 106.5 kg (234 lb 12.8 oz)   08/08/17 105.7 kg (233 lb)              We Performed the Following     Comprehensive DME        Primary Care Provider Office Phone # Fax #    Javier Erin Adrian [phone]       [facility] [address]   [facility] [address]        Equal Access to Services     JOSE RHOADES : Hadii aad ku hadasho Soomaali, waaxda luqadaha, qaybta kaalmada adeegyada, waxay idiin hayaan rodney christianson . So [facility] [phone].    ATENCIÓN: Si habla español, tiene a louis disposición servicios gratuitos de asistencia lingüística. Geni al [phone].    We comply with applicable federal civil rights laws and [place] laws. We do not discriminate on the basis of race, color, national origin, age, disability, sex, sexual orientation, or gender identity.            Thank you!     Thank you for choosing [facility]  for your care. Our goal is always to provide you with excellent care. Hearing back from our patients is one way we can continue to improve our services. Please take a few minutes to complete the written survey that you may receive in the mail after your visit with us. Thank you!             Your Updated Medication List - Protect others around you: Learn how to safely use, store and throw away your medicines at www.disposemymeds.org.          This list is accurate as of 10/3/18  1:57 PM.  Always use your most recent med list.                   Brand Name Dispense Instructions for use Diagnosis    aspirin 81 MG tablet      Take  by mouth " daily.        CRESTOR 20 MG tablet   Generic drug:  rosuvastatin      Take 20 mg by mouth daily.        MAGNESIUM OXIDE PO       Iron deficiency, MURRAY (obstructive sleep apnea)       nitroGLYcerin 0.4 MG/SPRAY spray    NITROLINGUAL     Place 1 spray under the tongue every 5 minutes as needed.        OMEGA-3 FISH OIL PO      Take 1 g by mouth daily    Polycythemia, MURRAY (obstructive sleep apnea)       omeprazole 20 MG CR capsule    priLOSEC     Take 1 capsule by mouth daily.        RANEXA PO       Iron deficiency, MURRAY (obstructive sleep apnea)       UNABLE TO FIND      Take 2.5 mg by mouth daily. MEDICATION NAME: Nebivolol HCl.

## 2018-10-08 DIAGNOSIS — G47.33 OSA (OBSTRUCTIVE SLEEP APNEA): ICD-10-CM

## 2018-10-08 DIAGNOSIS — E61.1 IRON DEFICIENCY: ICD-10-CM

## 2018-10-08 LAB
ERYTHROCYTE [DISTWIDTH] IN BLOOD BY AUTOMATED COUNT: 16.4 % (ref 10–15)
FERRITIN SERPL-MCNC: 10 NG/ML (ref 26–388)
HCT VFR BLD AUTO: 48.3 % (ref 40–53)
HGB BLD-MCNC: 15.3 G/DL (ref 13.3–17.7)
IRON SATN MFR SERPL: 16 % (ref 15–46)
IRON SERPL-MCNC: 66 UG/DL (ref 35–180)
MCH RBC QN AUTO: 27.1 PG (ref 26.5–33)
MCHC RBC AUTO-ENTMCNC: 31.7 G/DL (ref 31.5–36.5)
MCV RBC AUTO: 86 FL (ref 78–100)
PLATELET # BLD AUTO: 158 10E9/L (ref 150–450)
RBC # BLD AUTO: 5.64 10E12/L (ref 4.4–5.9)
TIBC SERPL-MCNC: 419 UG/DL (ref 240–430)
WBC # BLD AUTO: 7.5 10E9/L (ref 4–11)

## 2018-10-08 PROCEDURE — 85027 COMPLETE CBC AUTOMATED: CPT | Performed by: INTERNAL MEDICINE

## 2018-10-08 PROCEDURE — 82728 ASSAY OF FERRITIN: CPT | Performed by: INTERNAL MEDICINE

## 2018-10-08 PROCEDURE — 36415 COLL VENOUS BLD VENIPUNCTURE: CPT | Performed by: INTERNAL MEDICINE

## 2018-10-08 PROCEDURE — 83540 ASSAY OF IRON: CPT | Performed by: INTERNAL MEDICINE

## 2018-10-08 PROCEDURE — 83550 IRON BINDING TEST: CPT | Performed by: INTERNAL MEDICINE

## 2018-10-10 DIAGNOSIS — R05.3 PERSISTENT COUGH: Primary | ICD-10-CM

## 2018-10-11 ENCOUNTER — ONCOLOGY VISIT (OUTPATIENT)
Dept: ONCOLOGY | Facility: CLINIC | Age: 73
End: 2018-10-11
Payer: MEDICARE

## 2018-10-11 VITALS
TEMPERATURE: 98.6 F | RESPIRATION RATE: 18 BRPM | BODY MASS INDEX: 34.79 KG/M2 | OXYGEN SATURATION: 97 % | DIASTOLIC BLOOD PRESSURE: 79 MMHG | SYSTOLIC BLOOD PRESSURE: 117 MMHG | WEIGHT: 243 LBS | HEIGHT: 70 IN | HEART RATE: 74 BPM

## 2018-10-11 DIAGNOSIS — R53.83 OTHER FATIGUE: ICD-10-CM

## 2018-10-11 DIAGNOSIS — D75.1 POLYCYTHEMIA SECONDARY TO HYPOXIA: ICD-10-CM

## 2018-10-11 DIAGNOSIS — G47.33 OSA (OBSTRUCTIVE SLEEP APNEA): ICD-10-CM

## 2018-10-11 DIAGNOSIS — E61.1 IRON DEFICIENCY: Primary | ICD-10-CM

## 2018-10-11 DIAGNOSIS — K90.89 OTHER INTESTINAL MALABSORPTION: ICD-10-CM

## 2018-10-11 PROCEDURE — 99214 OFFICE O/P EST MOD 30 MIN: CPT | Performed by: INTERNAL MEDICINE

## 2018-10-11 ASSESSMENT — PAIN SCALES - GENERAL: PAINLEVEL: NO PAIN (0)

## 2018-10-11 NOTE — MR AVS SNAPSHOT
After Visit Summary   10/11/2018    Martell Coelho    MRN: [id number]           Patient Information     Date Of Birth          1945        Visit Information        Provider Department      10/11/2018 4:00 PM Rula Mo MD [facility]        Today's Diagnoses     Iron deficiency    -  1       Follow-ups after your visit        Your next 10 appointments already scheduled     Oct 19, 2018 12:30 PM CDT   Level 1 with [address] ([facility])    [address] [address] [place] [id number] [phone]            Dec 13, 2018  9:15 AM CST   XR CHEST 2 VIEWS with UCXR1   [facility] ([facility])    [address] [address] [phone]           How do I prepare for my exam? (Food and drink instructions) No Food and Drink Restrictions.  How do I prepare for my exam? (Other instructions) You do not need to do anything special for this exam.  What should I wear: Wear comfortable clothes.  How long does the exam take: Most scans take less than 5 minutes.  What should I bring: Bring a list of your medicines, including vitamins, minerals and over-the-counter drugs. It is safest to leave personal items at home.  Do I need a :  No  is needed.  What do I need to tell my doctor: Tell your doctor if there s any chance you are pregnant.  What should I do after the exam: No restrictions, You may resume normal activities.  What is this test: An image of a specific body part shown in shades of black and white.  Who should I call with questions: If you have any questions, please call the Imaging Department where you will have your exam. Directions, parking instructions, and other information is available on our website, [email or web address].            Dec 13, 2018  9:30 AM CST   FULL PULMONARY FUNCTION with UC PFL BYRON   [facility] Pulmonary Function  Testing ([facility])    [address]  3rd Floor  [facility] [address]   [phone]            Dec 13, 2018 10:40 AM CST   (Arrive by 10:25 AM)   New Patient Visit with John Cervantes MD   [facility] ([facility])    [address] [facility] [address]  [address]  [facility] [address]   [phone]            Ethan 10, 2019 11:00 AM CST   LAB with LAB ONC [facility] ([facility])    [address] [address] [place] [id number] [phone]           Please do not eat 10-12 hours before your appointment if you are coming in fasting for labs on lipids, cholesterol, or glucose (sugar). This does not apply to pregnant women. Water, hot tea and black coffee (with nothing added) are okay. Do not drink other fluids, diet soda or chew gum.            Ethan 15, 2019 12:30 PM CST   Return Visit with Rula Mo MD   [facility] ([facility])    [address] [place] [id number] [phone]              Future tests that were ordered for you today     Open Future Orders        Priority Expected Expires Ordered    Pulmonary function test Routine 10/10/2018 4/8/2019 10/10/2018    XR Chest 2 Views Routine 10/10/2018 4/8/2019 10/10/2018            Who to contact     If you have questions or need follow up information about today's clinic visit or your schedule please contact [facility] directly at [phone].  Normal or non-critical lab and imaging results will be communicated to you by MyChart, letter or phone within 4 business days after the clinic has received the results. If you do not hear from us within 7 days, please contact the clinic through MyChart or phone. If you have a critical or abnormal lab result, we will notify you by phone as soon as possible.  Submit refill requests through SynapSense or call your pharmacy  "and they will forward the refill request to us. Please allow 3 business days for your refill to be completed.          Additional Information About Your Visit        YouStickerharNealyWear Information     Acsendo gives you secure access to your electronic health record. If you see a primary care provider, you can also send messages to your care team and make appointments. If you have questions, please call your primary care clinic.  If you do not have a primary care provider, please call [phone] and they will assist you.      Acsendo is an electronic gateway that provides easy, online access to your medical records. With Acsendo, you can request a clinic appointment, read your test results, renew a prescription or communicate with your care team.     To access your existing account, please contact your [facility] or call [phone] for assistance.        Care EveryWhere ID     This is your Care EveryWhere ID. This could be used by other organizations to access your [place] medical records  [id number]        Your Vitals Were     Pulse Temperature Respirations Height Pulse Oximetry BMI (Body Mass Index)    74 98.6  F (37  C) 18 1.778 m (5' 10\") 97% 34.87 kg/m2       Blood Pressure from Last 3 Encounters:   10/11/18 117/79   10/03/18 125/85   08/09/17 115/79    Weight from Last 3 Encounters:   10/11/18 110.2 kg (243 lb)   10/03/18 107.5 kg (237 lb)   08/09/17 106.5 kg (234 lb 12.8 oz)              Today, you had the following     No orders found for display       Primary Care Provider Office Phone # Fax #    Javier Adrian [phone]       [facility] [address]   [facility] [address]        Equal Access to Services     JOSE RHOADES AH: Hadii ashly rene Solizzy, waaxda luqadaha, qaybta kaalmada adeegyada, paramjit lassiter. So [facility] [phone].    ATENCIÓN: Si habla español, tiene a louis disposición servicios gratuitos de " asistencia lingüística. Geni al [phone].    We comply with applicable federal civil rights laws and [place] laws. We do not discriminate on the basis of race, color, national origin, age, disability, sex, sexual orientation, or gender identity.            Thank you!     Thank you for choosing [facility]  for your care. Our goal is always to provide you with excellent care. Hearing back from our patients is one way we can continue to improve our services. Please take a few minutes to complete the written survey that you may receive in the mail after your visit with us. Thank you!             Your Updated Medication List - Protect others around you: Learn how to safely use, store and throw away your medicines at www.disposemymeds.org.          This list is accurate as of 10/11/18  4:28 PM.  Always use your most recent med list.                   Brand Name Dispense Instructions for use Diagnosis    aspirin 81 MG tablet      Take  by mouth daily.        CRESTOR 20 MG tablet   Generic drug:  rosuvastatin      Take 40 mg by mouth daily        MAGNESIUM OXIDE PO       Iron deficiency, MURRAY (obstructive sleep apnea)       nitroGLYcerin 0.4 MG/SPRAY spray    NITROLINGUAL     Place 1 spray under the tongue every 5 minutes as needed.        OMEGA-3 FISH OIL PO      Take 1 g by mouth daily    Polycythemia, MURRAY (obstructive sleep apnea)       omeprazole 20 MG CR capsule    priLOSEC     Take 1 capsule by mouth daily.        RANEXA PO       Iron deficiency, MURRAY (obstructive sleep apnea)       UNABLE TO FIND      Take 2.5 mg by mouth daily. MEDICATION NAME: Nebivolol HCl.

## 2018-10-11 NOTE — NURSING NOTE
"Oncology Rooming Note    October 11, 2018 3:56 PM   Martell Coelho is a 73 year old male who presents for:    Chief Complaint   Patient presents with     Oncology Clinic Visit     follow up      Initial Vitals: /79  Pulse 74  Temp 98.6  F (37  C)  Resp 18  Ht 1.778 m (5' 10\")  Wt 110.2 kg (243 lb)  SpO2 97%  BMI 34.87 kg/m2 Estimated body mass index is 34.87 kg/(m^2) as calculated from the following:    Height as of this encounter: 1.778 m (5' 10\").    Weight as of this encounter: 110.2 kg (243 lb). Body surface area is 2.33 meters squared.  No Pain (0) Comment: Data Unavailable   No LMP for male patient.  Allergies reviewed: Yes  Medications reviewed: Yes    Medications: Medication refills not needed today.  Pharmacy name entered into EPIC: Data Unavailable         5 minutes for nursing intake (face to face time)     Lili Yuan LPN              "

## 2018-10-11 NOTE — PROGRESS NOTES
Hematology Follow-up visit:  Date on this visit: Oct 11, 2018    Primary Care Physician: Marli Daly.    Diagnosis:   1. Secondary Erythrocytosis   He transferred his care to me in June 2015, due to Dr. Barber leaving our practice. He has originally met with Dr. Barber in 07/2013 when he was evaluated for erythrocytosis. He reports that in 2013 in [facility] he was noted to have Hb eed 18-20 range and underwent three phlebotomies every other day, 500 cc removed each time. These were associated with lightheadedness. When he was seen by Dr. Barber his Hb was normal at 16.2 g/dl, HCT of 46.2. WBC was 5.5 with normal absolute differential counts and platelet count was 182. LFTS were normal, ferritin was 39. TSH was normal, LDH was normal. PSA was 0.42 and DAVID 2 mutation analysis was negative. At that time he was also diagnosed with MURRAY and his erythrocytosis was felt to be secondary to MURRAY and no phlebotomy was recommended. However, he went back to [facility] and in 2015 he was still having monthly phlebotomies to keep his HCT down although it sounds like HCT was in the mid 40s there. When we saw him in June 2015, his serum erythropoietin level was normal at 27. Hb was 14.1 and ferritin was low at 11. CT abdomen 6/18/2015 showed colonic diverticulosis without inflammatory change and elevation of L hemidiaphragm. Adrenals, spleen and pancreas and liver appeared normal.He has a Hx of CAD, s/p PTCIx2. He is active and exercises daily and swims. In June 2015,  have recommended that he discontinue phlebotomy sessions in [facility] and have his CBCd checked annually. He reports having an EGD in [facility] in May 2016 when after the biopsy he had an UGI bleed. He required repeat EGD and cauterization of bleeding area, per patient. His Hb and ferritin fell per patient, and he was given 5 infusions of Monofer in [facility], he believes 100 mg each, IV. He felt better following the infusions.     History Of Present Illness:  Mr. Coelho is a 73  year old male who presents for f/up secondary erythrocytosis (secondary to sleep apnea) and iron deficiency. He has not followed up in over a year. He is originally from [facility] but his daughter lives in the  but he spents a fair amout of time here too.  He is already on ASA 81 mg PO daily. He has not tolerated oral iron (had constipation) nor is he willing to take oral iron supplementation anymore. His ferritin has remained low and unchanged over at least the last 3 years. He has his Hb checked regularly in [facility], every two months and it has been in the normal range. He does feel fatigued and wishes he had more stamina and exercise endurance. He likes to ski in the winter but does not know if he will have enough energy.   He has occasional hard stools, but no diarrhea.   PSA was normal in 09/2018 (outside labs reviewed).  He saw a cardiologist  in [facility] earlier this month. He is s/p  PCI in 2007 with an RCA stent and in 2008 with an LAD stent. He is on rosuvastatin for hyperlipidemia. He is a former smoker and has had some nonproductive cough and will be seeing a pulmonologist.    He has remained on CPAP for sleep apnea.   In addition, a complete 12 point  review of systems is negative.    Past Medical/Surgical History:  CAD, s/p PTCI  MURRAY  Secondary erythrocytosis  BPH   He has a Hx of chronic pancreatitis in the past. He has had a cholecystectomy in the past.  He has a Hx of diverticulits, recurrent x3 in the past.  He has a Hx of BPH, s/p TURP in [facility] and here has been following up with Dr. Infante.  He had a colonoscopy on 8/3/2017 - which showed Diverticulosis of colon and repeat was recommended in 10 years.     FHx and Soc Hx reviewed   He is a former smoker.    Past Surgical History:   Procedure Laterality Date     APPENDECTOMY       CYSTOSCOPY, TRANSURETHRAL RESECTION (TUR) PROSTATE, COMBINED     Cholecystectomy    Allergies:  Allergies as of 10/11/2018     (No Known Allergies)     Current  "Medications:  Current Outpatient Prescriptions   Medication Sig Dispense Refill     aspirin 81 MG tablet Take  by mouth daily.       MAGNESIUM OXIDE PO        nitroglycerin (NITROLINGUAL) 0.4 MG/SPRAY spray Place 1 spray under the tongue every 5 minutes as needed.       Omega-3 Fatty Acids (OMEGA-3 FISH OIL PO) Take 1 g by mouth daily       omeprazole (PRILOSEC) 20 MG capsule Take 1 capsule by mouth daily.       Ranolazine (RANEXA PO)        rosuvastatin (CRESTOR) 20 MG tablet Take 20 mg by mouth daily.       UNABLE TO FIND Take 2.5 mg by mouth daily. MEDICATION NAME: Nebivolol HCl.           Physical Exam:   /79  Pulse 74  Temp 98.6  F (37  C)  Resp 18  Ht 1.778 m (5' 10\")  Wt 110.2 kg (243 lb)  SpO2 97%  BMI 34.87 kg/m2        GENERAL APPEARANCE: healthy, alert and no distress     NECK: no adenopathy, no asymmetry or masses     RESP: lungs clear to auscultation - no rales, rhonchi or wheezes     CARDIOVASCULAR: regular rates and rhythm, normal S1 S2, no S3 or S4 and no murmur.     ABDOMEN:  soft, nontender, no HSM or masses and bowel sounds normal     MUSCULOSKELETAL: extremities normal- no gross deformities noted, no evidence of inflammation in joints, FROM in all extremities. No edema b/l LE.     SKIN: no suspicious lesions or rashes     PSYCHIATRIC: mentation appears normal and affect normal    Laboratory/Imaging Studies  Results for orders placed or performed in visit on 10/08/18   CBC with platelets   Result Value Ref Range    WBC 7.5 4.0 - 11.0 10e9/L    RBC Count 5.64 4.4 - 5.9 10e12/L    Hemoglobin 15.3 13.3 - 17.7 g/dL    Hematocrit 48.3 40.0 - 53.0 %    MCV 86 78 - 100 fl    MCH 27.1 26.5 - 33.0 pg    MCHC 31.7 31.5 - 36.5 g/dL    RDW 16.4 (H) 10.0 - 15.0 %    Platelet Count 158 150 - 450 10e9/L   Iron and iron binding capacity   Result Value Ref Range    Iron 66 35 - 180 ug/dL    Iron Binding Cap 419 240 - 430 ug/dL    Iron Saturation Index 16 15 - 46 %   Ferritin   Result Value Ref Range "    Ferritin 10 (L) 26 - 388 ng/mL     ASSESSMENT/PLAN:    Mr. Coelho is a very pleasant 73 year old with secondary erythrocytosis secondary to MURRAY. He has remained on  CPAP. Hb is normal but ferritin is low but he reports an UGI bleed in 2016 in [place], followed by 5 doses of IV iron (Monofer, 100 mg each dose, per patient). He is feeling fatigued and has low exercise tolerance and is intolerant of oral iron. He would like to try a course of IV iron to see if it improves his fatigue.   1. Iron deficiency - Given his low ferritin level, I do not think it is unreasonable to try one dose of IV Injectafer (750mg). Rationale and side effects reviewed with the patient and he is agreeable to proceed. We'll then see him in 3 months after his Injectafer dose with repeat Hb and ferritin levels.     2. Secondary erythrocytosis- He will continue on CPAP for sleep apnea. His secondary erythrocytosis has resolved with CPAP use.  3. BPH- recent normal PSA. F/up with urology  4. Former smoker, cough- has an appt to see pulmonary medicine at [facility]  5. F/up with Dr. Murillo for all his other general medical needs.  At the end of our visit patient verbalized understanding and concurred with the plan.

## 2018-10-11 NOTE — LETTER
10/11/2018         RE: Martell Coelho  [address] [facility] [address]        Dear Colleague,    Thank you for referring your patient, Martell Coelho, to the [facility]. Please see a copy of my visit note below.    Hematology Follow-up visit:  Date on this visit: Oct 11, 2018    Primary Care Physician: Marli Daly.    Diagnosis:   1. Secondary Erythrocytosis   He transferred his care to me in June 2015, due to Dr. Barber leaving our practice. He has originally met with Dr. Barber in 07/2013 when he was evaluated for erythrocytosis. He reports that in 2013 in [facility] he was noted to have Hb ede 18-20 range and underwent three phlebotomies every other day, 500 cc removed each time. These were associated with lightheadedness. When he was seen by Dr. Barber his Hb was normal at 16.2 g/dl, HCT of 46.2. WBC was 5.5 with normal absolute differential counts and platelet count was 182. LFTS were normal, ferritin was 39. TSH was normal, LDH was normal. PSA was 0.42 and DAVID 2 mutation analysis was negative. At that time he was also diagnosed with MURRAY and his erythrocytosis was felt to be secondary to MURRAY and no phlebotomy was recommended. However, he went back to [facility] and in 2015 he was still having monthly phlebotomies to keep his HCT down although it sounds like HCT was in the mid 40s there. When we saw him in June 2015, his serum erythropoietin level was normal at 27. Hb was 14.1 and ferritin was low at 11. CT abdomen 6/18/2015 showed colonic diverticulosis without inflammatory change and elevation of L hemidiaphragm. Adrenals, spleen and pancreas and liver appeared normal.He has a Hx of CAD, s/p PTCIx2. He is active and exercises daily and swims. In June 2015,  have recommended that he discontinue phlebotomy sessions in [facility] and have his CBCd checked annually. He reports having an EGD in [facility] in May 2016 when after the biopsy he had an UGI bleed. He required repeat EGD and cauterization  of bleeding area, per patient. His Hb and ferritin fell per patient, and he was given 5 infusions of Monofer in [facility], he believes 100 mg each, IV. He felt better following the infusions.     History Of Present Illness:  Mr. Coelho is a 73 year old male who presents for f/up secondary erythrocytosis (secondary to sleep apnea) and iron deficiency. He has not followed up in over a year. He is originally from [facility] but his daughter lives in the  but he spents a fair amout of time here too.  He is already on ASA 81 mg PO daily. He has not tolerated oral iron (had constipation) nor is he willing to take oral iron supplementation anymore. His ferritin has remained low and unchanged over at least the last 3 years. He has his Hb checked regularly in [facility], every two months and it has been in the normal range. He does feel fatigued and wishes he had more stamina and exercise endurance. He likes to ski in the winter but does not know if he will have enough energy.   He has occasional hard stools, but no diarrhea.   PSA was normal in 09/2018 (outside labs reviewed).  He saw a cardiologist  in [facility] earlier this month. He is s/p  PCI in 2007 with an RCA stent and in 2008 with an LAD stent. He is on rosuvastatin for hyperlipidemia. He is a former smoker and has had some nonproductive cough and will be seeing a pulmonologist.    He has remained on CPAP for sleep apnea.   In addition, a complete 12 point  review of systems is negative.    Past Medical/Surgical History:  CAD, s/p PTCI  MURRAY  Secondary erythrocytosis  BPH   He has a Hx of chronic pancreatitis in the past. He has had a cholecystectomy in the past.  He has a Hx of diverticulits, recurrent x3 in the past.  He has a Hx of BPH, s/p TURP in [facility] and here has been following up with Dr. Infante.  He had a colonoscopy on 8/3/2017 - which showed Diverticulosis of colon and repeat was recommended in 10 years.     FHx and Soc Hx reviewed   He is a former  "smoker.    Past Surgical History:   Procedure Laterality Date     APPENDECTOMY       CYSTOSCOPY, TRANSURETHRAL RESECTION (TUR) PROSTATE, COMBINED     Cholecystectomy    Allergies:  Allergies as of 10/11/2018     (No Known Allergies)     Current Medications:  Current Outpatient Prescriptions   Medication Sig Dispense Refill     aspirin 81 MG tablet Take  by mouth daily.       MAGNESIUM OXIDE PO        nitroglycerin (NITROLINGUAL) 0.4 MG/SPRAY spray Place 1 spray under the tongue every 5 minutes as needed.       Omega-3 Fatty Acids (OMEGA-3 FISH OIL PO) Take 1 g by mouth daily       omeprazole (PRILOSEC) 20 MG capsule Take 1 capsule by mouth daily.       Ranolazine (RANEXA PO)        rosuvastatin (CRESTOR) 20 MG tablet Take 20 mg by mouth daily.       UNABLE TO FIND Take 2.5 mg by mouth daily. MEDICATION NAME: Nebivolol HCl.           Physical Exam:   /79  Pulse 74  Temp 98.6  F (37  C)  Resp 18  Ht 1.778 m (5' 10\")  Wt 110.2 kg (243 lb)  SpO2 97%  BMI 34.87 kg/m2        GENERAL APPEARANCE: healthy, alert and no distress     NECK: no adenopathy, no asymmetry or masses     RESP: lungs clear to auscultation - no rales, rhonchi or wheezes     CARDIOVASCULAR: regular rates and rhythm, normal S1 S2, no S3 or S4 and no murmur.     ABDOMEN:  soft, nontender, no HSM or masses and bowel sounds normal     MUSCULOSKELETAL: extremities normal- no gross deformities noted, no evidence of inflammation in joints, FROM in all extremities. No edema b/l LE.     SKIN: no suspicious lesions or rashes     PSYCHIATRIC: mentation appears normal and affect normal    Laboratory/Imaging Studies  Results for orders placed or performed in visit on 10/08/18   CBC with platelets   Result Value Ref Range    WBC 7.5 4.0 - 11.0 10e9/L    RBC Count 5.64 4.4 - 5.9 10e12/L    Hemoglobin 15.3 13.3 - 17.7 g/dL    Hematocrit 48.3 40.0 - 53.0 %    MCV 86 78 - 100 fl    MCH 27.1 26.5 - 33.0 pg    MCHC 31.7 31.5 - 36.5 g/dL    RDW 16.4 (H) 10.0 - " 15.0 %    Platelet Count 158 150 - 450 10e9/L   Iron and iron binding capacity   Result Value Ref Range    Iron 66 35 - 180 ug/dL    Iron Binding Cap 419 240 - 430 ug/dL    Iron Saturation Index 16 15 - 46 %   Ferritin   Result Value Ref Range    Ferritin 10 (L) 26 - 388 ng/mL     ASSESSMENT/PLAN:    Mr. Coelho is a very pleasant 73 year old with secondary erythrocytosis secondary to MURRAY. He has remained on  CPAP. Hb is normal but ferritin is low but he reports an UGI bleed in 2016 in [facility], followed by 5 doses of IV iron (Monofer, 100 mg each dose, per patient). He is feeling fatigued and has low exercise tolerance and is intolerant of oral iron. He would like to try a course of IV iron to see if it improves his fatigue.   1. Iron deficiency - Given his low ferritin level, I do not think it is unreasonable to try one dose of IV Injectafer (750mg). Rationale and side effects reviewed with the patient and he is agreeable to proceed. We'll then see him in 3 months after his Injectafer dose with repeat Hb and ferritin levels.     2. Secondary erythrocytosis- He will continue on CPAP for sleep apnea. His secondary erythrocytosis has resolved with CPAP use.  3. BPH- recent normal PSA. F/up with urology  4. Former smoker, cough- has an appt to see pulmonary medicine at [facility]  5. F/up with Dr. Murillo for all his other general medical needs.  At the end of our visit patient verbalized understanding and concurred with the plan.            Again, thank you for allowing me to participate in the care of your patient.        Sincerely,        Rula Mo MD, MD

## 2018-10-17 PROBLEM — K90.89 OTHER INTESTINAL MALABSORPTION: Status: ACTIVE | Noted: 2018-10-17

## 2018-10-17 PROBLEM — R53.83 OTHER FATIGUE: Status: ACTIVE | Noted: 2018-10-17

## 2019-01-08 ENCOUNTER — DOCUMENTATION ONLY (OUTPATIENT)
Dept: LAB | Facility: CLINIC | Age: 74
End: 2019-01-08

## 2019-02-19 ENCOUNTER — MYC MEDICAL ADVICE (OUTPATIENT)
Dept: SLEEP MEDICINE | Facility: CLINIC | Age: 74
End: 2019-02-19

## 2019-04-04 ENCOUNTER — PATIENT OUTREACH (OUTPATIENT)
Dept: ONCOLOGY | Facility: CLINIC | Age: 74
End: 2019-04-04

## 2019-04-04 NOTE — PROGRESS NOTES
Returned patient's phone call re:  Asking if he should come in for lab check prior to him leaving the country at the end of April for a year.  Patient was not aware that he missed his scheduled appointment in January.  Advised that he should come in for follow-up this month with labs and visit with Dr. Mo before he leaves; patient agrees.  Appointments were scheduled.

## 2019-04-08 ENCOUNTER — OFFICE VISIT (OUTPATIENT)
Dept: SLEEP MEDICINE | Facility: CLINIC | Age: 74
End: 2019-04-08
Payer: MEDICARE

## 2019-04-08 VITALS
HEIGHT: 70 IN | BODY MASS INDEX: 34.65 KG/M2 | OXYGEN SATURATION: 98 % | HEART RATE: 68 BPM | SYSTOLIC BLOOD PRESSURE: 134 MMHG | WEIGHT: 242 LBS | RESPIRATION RATE: 16 BRPM | DIASTOLIC BLOOD PRESSURE: 87 MMHG

## 2019-04-08 DIAGNOSIS — G47.33 OSA (OBSTRUCTIVE SLEEP APNEA): Primary | ICD-10-CM

## 2019-04-08 PROCEDURE — 99213 OFFICE O/P EST LOW 20 MIN: CPT | Performed by: INTERNAL MEDICINE

## 2019-04-08 ASSESSMENT — MIFFLIN-ST. JEOR: SCORE: 1858.95

## 2019-04-08 NOTE — NURSING NOTE
"Chief Complaint   Patient presents with     CPAP Follow Up     Follow up jen        Initial /87   Pulse 68   Resp 16   Ht 1.778 m (5' 10\")   Wt 109.8 kg (242 lb)   SpO2 98%   BMI 34.72 kg/m   Estimated body mass index is 34.72 kg/m  as calculated from the following:    Height as of this encounter: 1.778 m (5' 10\").    Weight as of this encounter: 109.8 kg (242 lb).    Medication Reconciliation: complete    ESS 5    Ina Saxena MA      "

## 2019-04-08 NOTE — PATIENT INSTRUCTIONS
Your Body mass index is 34.72 kg/m .  Weight management is a personal decision.  If you are interested in exploring weight loss strategies, the following discussion covers the approaches that may be successful. Body mass index (BMI) is one way to tell whether you are at a healthy weight, overweight, or obese. It measures your weight in relation to your height.  A BMI of 18.5 to 24.9 is in the healthy range. A person with a BMI of 25 to 29.9 is considered overweight, and someone with a BMI of 30 or greater is considered obese. More than two-thirds of American adults are considered overweight or obese.  Being overweight or obese increases the risk for further weight gain. Excess weight may lead to heart disease and diabetes.  Creating and following plans for healthy eating and physical activity may help you improve your health.  Weight control is part of healthy lifestyle and includes exercise, emotional health, and healthy eating habits. Careful eating habits lifelong are the mainstay of weight control. Though there are significant health benefits from weight loss, long-term weight loss with diet alone may be very difficult to achieve- studies show long-term success with dietary management in less than 10% of people. Attaining a healthy weight may be especially difficult to achieve in those with severe obesity. In some cases, medications, devices and surgical management might be considered.  What can you do?  If you are overweight or obese and are interested in methods for weight loss, you should discuss this with your provider.     Consider reducing daily calorie intake by 500 calories.     Keep a food journal.     Avoiding skipping meals, consider cutting portions instead.    Diet combined with exercise helps maintain muscle while optimizing fat loss. Strength training is particularly important for building and maintaining muscle mass. Exercise helps reduce stress, increase energy, and improves fitness.  Increasing exercise without diet control, however, may not burn enough calories to loose weight.       Start walking three days a week 10-20 minutes at a time    Work towards walking thirty minutes five days a week     Eventually, increase the speed of your walking for 1-2 minutes at time    In addition, we recommend that you review healthy lifestyles and methods for weight loss available through the National Institutes of Health patient information sites:  http://win.niddk.nih.gov/publications/index.htm    And look into health and wellness programs that may be available through your health insurance provider, employer, local community center, or laura club.    Weight management plan: Patient was referred to their PCP to discuss a diet and exercise plan.

## 2019-04-08 NOTE — PROGRESS NOTES
"  Obstructive Sleep Apnea - PAP Follow-Up Visit:    Chief Complaint   Patient presents with     CPAP Follow Up     Follow up jen        Martell Coelho comes in today for follow-up of their mild sleep apnea, managed with CPAP.     PSG from 7/27/2017 showed an AHI of 7.2 per hour. PSG in 2013 had shown AHI of 22.3 per hour.     Medical history is significant for hypertension and coronary artery disease.     Overall, he rates the experience with PAP as 7 (0 poor, 10 great). The mask is comfortable. The mask is not leaking.  He is not snoring with the mask on. He is not having gasp arousals.  He is not having significant oral/nasal dryness. The pressure settings are comfortable.     He uses nasal mask.     He does feel rested in the morning.    [place] Sleepiness Scale: 5/24    [employer]Med     Auto-PAP 6.6- 11  cmH2O download:  24/30 total days of use. 6 nonuse days. 24/30 days with >4 hours use.  Average use 6 hours 55 minutes per day. CPAP 95% pressure 10.9cm. 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   Coronary artery disease     - Tolerating PAP and patient reports that he feels better when he uses CPAP. He has sleepiness during the day when he doesnot use CPAP. However, he had questions about his baseline level " of sleep apnea and sometimes considers CPAP to be burden. We reviewed his sleep studies in detail. Considering clinical benefit from CPAP, continued use was recommended.     Plan:     1. Continue CPAP therapy     Martell Coelho will follow up in about 1 year(s).     Fifteen minutes spent with patient, all of which were spent face-to-face counseling, consulting, coordinating plan of care.      Constantin Woody MD, MD    CC:  Javier Adrian,

## 2019-04-16 ENCOUNTER — ONCOLOGY VISIT (OUTPATIENT)
Dept: ONCOLOGY | Facility: CLINIC | Age: 74
End: 2019-04-16
Payer: MEDICARE

## 2019-04-16 VITALS
DIASTOLIC BLOOD PRESSURE: 77 MMHG | SYSTOLIC BLOOD PRESSURE: 142 MMHG | TEMPERATURE: 98.7 F | BODY MASS INDEX: 34.38 KG/M2 | OXYGEN SATURATION: 97 % | HEART RATE: 69 BPM | WEIGHT: 240.13 LBS | RESPIRATION RATE: 16 BRPM | HEIGHT: 70 IN

## 2019-04-16 DIAGNOSIS — D75.1 SECONDARY ERYTHROCYTOSIS: ICD-10-CM

## 2019-04-16 DIAGNOSIS — E61.1 IRON DEFICIENCY: Primary | ICD-10-CM

## 2019-04-16 DIAGNOSIS — E61.1 IRON DEFICIENCY: ICD-10-CM

## 2019-04-16 LAB
FERRITIN SERPL-MCNC: 11 NG/ML (ref 26–388)
HGB BLD-MCNC: 15 G/DL (ref 13.3–17.7)

## 2019-04-16 PROCEDURE — 82728 ASSAY OF FERRITIN: CPT | Performed by: INTERNAL MEDICINE

## 2019-04-16 PROCEDURE — 36415 COLL VENOUS BLD VENIPUNCTURE: CPT | Performed by: INTERNAL MEDICINE

## 2019-04-16 PROCEDURE — 85018 HEMOGLOBIN: CPT | Performed by: INTERNAL MEDICINE

## 2019-04-16 PROCEDURE — 99214 OFFICE O/P EST MOD 30 MIN: CPT | Performed by: INTERNAL MEDICINE

## 2019-04-16 RX ORDER — NEBIVOLOL 2.5 MG/1
2.5 TABLET ORAL DAILY
COMMUNITY

## 2019-04-16 RX ORDER — PANTOPRAZOLE SODIUM 40 MG/1
40 TABLET, DELAYED RELEASE ORAL DAILY
COMMUNITY

## 2019-04-16 ASSESSMENT — MIFFLIN-ST. JEOR: SCORE: 1850.45

## 2019-04-16 ASSESSMENT — PAIN SCALES - GENERAL: PAINLEVEL: NO PAIN (0)

## 2019-04-16 NOTE — NURSING NOTE
"Oncology Rooming Note    April 16, 2019 3:48 PM   Martell Coelho is a 73 year old male who presents for:    Chief Complaint   Patient presents with     Oncology Clinic Visit     3 month follow up     Initial Vitals: /77 (BP Location: Right arm)   Pulse 69   Temp 98.7  F (37.1  C) (Oral)   Resp 16   Ht 1.778 m (5' 10\")   Wt 108.9 kg (240 lb 2 oz)   SpO2 97%   BMI 34.45 kg/m   Estimated body mass index is 34.45 kg/m  as calculated from the following:    Height as of this encounter: 1.778 m (5' 10\").    Weight as of this encounter: 108.9 kg (240 lb 2 oz). Body surface area is 2.32 meters squared.  No Pain (0) Comment: Data Unavailable   No LMP for male patient.  Allergies reviewed: Yes  Medications reviewed: Yes    Medications: Medication refills not needed today.  Pharmacy name entered into EPIC: Data Unavailable      Doreen Fernandes LPN            "

## 2019-04-16 NOTE — PROGRESS NOTES
Hematology Follow-up visit:  Date on this visit: Apr 16, 2019    Primary Care Physician: Marli Daly.    Diagnosis:   1. Secondary Erythrocytosis   He transferred his care to me in June 2015, due to Dr. Barber leaving our practice. He has originally met with Dr. Barber in 07/2013 when he was evaluated for erythrocytosis. He reports that in 2013 in [facility] he was noted to have Hb ede 18-20 range and underwent three phlebotomies every other day, 500 cc removed each time. These were associated with lightheadedness. When he was seen by Dr. Barber his Hb was normal at 16.2 g/dl, HCT of 46.2. WBC was 5.5 with normal absolute differential counts and platelet count was 182. LFTS were normal, ferritin was 39. TSH was normal, LDH was normal. PSA was 0.42 and DAVID 2 mutation analysis was negative. At that time he was also diagnosed with MURRAY and his erythrocytosis was felt to be secondary to MURRAY and no phlebotomy was recommended. However, he went back to [facility] and in 2015 he was still having monthly phlebotomies to keep his HCT down although it sounds like HCT was in the mid 40s there. When we saw him in June 2015, his serum erythropoietin level was normal at 27. Hb was 14.1 and ferritin was low at 11. CT abdomen 6/18/2015 showed colonic diverticulosis without inflammatory change and elevation of L hemidiaphragm. Adrenals, spleen and pancreas and liver appeared normal.He has a Hx of CAD, s/p PTCIx2. He is active and exercises daily and swims. In June 2015,  have recommended that he discontinue phlebotomy sessions in [facility] and have his CBCd checked annually. He reports having an EGD in [facility] in May 2016 when after the biopsy he had an UGI bleed. He required repeat EGD and cauterization of bleeding area, per patient. His Hb and ferritin fell per patient, and he was given 5 infusions of Monofer in [facility], he believes 100 mg each, IV. He felt better following the infusions.     History Of Present Illness:  Mr. Coelho is a 73  year old male who presents for f/up secondary erythrocytosis (secondary to sleep apnea) and iron deficiency. I last saw him in October 2018 and he spent a winter here and plans to go back to [facility] in the summer. [place]  He is already on ASA 81 mg PO daily. He has not tolerated oral iron (had constipation)  His ferritin has remained low and unchanged over at least the last 3 years, most recently as below:  Results for ADRIAN BELL (MRN [id number]) as of 4/18/2019 14:46   Ref. Range 6/25/2015 08:21 8/8/2017 13:38 10/8/2018 11:08 4/16/2019 15:00   Ferritin Latest Ref Range: 26 - 388 ng/mL 11 (L) 16 (L) 10 (L) 11 (L)   Hemoglobin has remained normal at around 15 g/dL.  He has continued to use CPAP.  We have tried to give him IV ferric carboxymaltose but his insurance would not approve the infusion and he would incur out-of-pocket costs.  He declined proceeding with IV Injectafer if he had to pay out of pocket and plans to return to [facility] and see if he could get Injectafer over there. He has his Hb checked regularly in [facility], every two months and it has been in the normal range. He does feel fatigued and wishes he had more stamina and exercise endurance.  His bowel movements have been regular.  He is pain-free today.  Weight has been stable.  Besides wishing for more energy with exercise, he does not have any health related complaints today.  Unfortunately, his friend has recently passed away from lung cancer.  The patient was recently evaluated with his PCP and [facility] (outside medical records reviewed) for congestion and cough times 1 week in February 2019 and was treated with Zithromax.  He is a former smoker and is not on albuterol inhaler. Chest x-ray was done in February 2019 and demonstrated persistent mild left jaime-diaphragm elevation, and no active disease.  In addition, a complete 12 point  review of systems is negative.    Past Medical/Surgical History:  CAD, s/p  "PTCI  MURRAY  Secondary erythrocytosis  BPH   He has a Hx of chronic pancreatitis in the past. He has had a cholecystectomy in the past.  He has a Hx of diverticulits, recurrent x3 in the past.  He has a Hx of BPH, s/p TURP in [facility] and here has been following up with Dr. Infante.  He had a colonoscopy on 8/3/2017 - which showed Diverticulosis of colon and repeat was recommended in 10 years.     FHx and Soc Hx reviewed   He is a former smoker.    Past Surgical History:   Procedure Laterality Date     APPENDECTOMY       CYSTOSCOPY, TRANSURETHRAL RESECTION (TUR) PROSTATE, COMBINED     Cholecystectomy    Allergies:  Allergies as of 04/16/2019     (No Known Allergies)     Current Medications:  Current Outpatient Medications   Medication Sig Dispense Refill     aspirin 81 MG tablet Take  by mouth daily.       MAGNESIUM OXIDE PO        nebivolol (BYSTOLIC) 2.5 MG tablet Take 2.5 mg by mouth daily       Omega-3 Fatty Acids (OMEGA-3 FISH OIL PO) Take 1 g by mouth daily       pantoprazole (PROTONIX) 40 MG EC tablet Take 40 mg by mouth daily       Ranolazine (RANEXA PO)        rosuvastatin (CRESTOR) 20 MG tablet Take 40 mg by mouth daily        UNABLE TO FIND Take 2.5 mg by mouth daily. MEDICATION NAME: Nebivolol HCl.        nitroglycerin (NITROLINGUAL) 0.4 MG/SPRAY spray Place 1 spray under the tongue every 5 minutes as needed.       omeprazole (PRILOSEC) 20 MG capsule Take 1 capsule by mouth daily.          Physical Exam:   /77 (BP Location: Right arm)   Pulse 69   Temp 98.7  F (37.1  C) (Oral)   Resp 16   Ht 1.778 m (5' 10\")   Wt 108.9 kg (240 lb 2 oz)   SpO2 97%   BMI 34.45 kg/m             GENERAL APPEARANCE: healthy, alert and no distress     NECK:  no asymmetry or masses     RESP: lungs clear to auscultation - no rales, rhonchi or wheezes     CARDIOVASCULAR: regular rates and rhythm, normal S1 S2, no S3 or S4 and no murmur.     ABDOMEN:  soft, nontender, no HSM or masses and bowel sounds normal     " MUSCULOSKELETAL: extremities normal- no gross deformities noted, no evidence of inflammation in joints, FROM in all extremities. No edema b/l LE.     SKIN: no suspicious lesions or rashes     PSYCHIATRIC: mentation appears normal and affect normal    Laboratory/Imaging Studies  Results for orders placed or performed in visit on 04/16/19   Ferritin   Result Value Ref Range    Ferritin 11 (L) 26 - 388 ng/mL   Hemoglobin   Result Value Ref Range    Hemoglobin 15.0 13.3 - 17.7 g/dL     ASSESSMENT/PLAN:    Mr. Coelho is a very pleasant 73 year old with secondary erythrocytosis secondary to MURRAY. He has remained on  CPAP. Hb is normal but ferritin is low but he reports an UGI bleed in 2016 in [facility], followed by 5 doses of IV iron (Monofer, 100 mg each dose, per patient). He is feeling fatigued and has low exercise tolerance and is intolerant of oral iron. He would like to try a course of IV iron to see if it improves his fatigue.     1. Iron deficiency - Given his low ferritin level, I felt it was unreasonable to try one dose of IV Injectafer (750mg) but his insurance would not cover it for diagnosis of iron deficiency without anemia.  At this time patient is willing to try oral iron and I recommended that he start on ferrous sulfate supplementation 325 mg p.o. 3 times a week.  If he develops constipation he was encouraged to take stool softeners.  He is planning to have repeat Hb and ferritin levels done in [facility].  He will also inquire about the cost of Injectafer in [facility] and see if he would like to receive it there.  He will follow-up with us when he returns from [facility].  He prefers to call and make that appointment on his own.    2. Secondary erythrocytosis- He will continue on CPAP for sleep apnea. His secondary erythrocytosis has resolved with CPAP use.  3.  F/up with Dr. Murillo for all his other general medical needs.  At the end of our visit patient verbalized understanding and concurred with the plan.

## 2019-04-16 NOTE — LETTER
4/16/2019         RE: Martell Coelho  [id number]  [place] [address]        Dear Colleague,    Thank you for referring your patient, Martell Coelho, to the [facility]. Please see a copy of my visit note below.    Hematology Follow-up visit:  Date on this visit: Apr 16, 2019    Primary Care Physician: Marli Daly.    Diagnosis:   1. Secondary Erythrocytosis   He transferred his care to me in June 2015, due to Dr. Barber leaving our practice. He has originally met with Dr. Barber in 07/2013 when he was evaluated for erythrocytosis. He reports that in 2013 in [facility] he was noted to have Hb ede 18-20 range and underwent three phlebotomies every other day, 500 cc removed each time. These were associated with lightheadedness. When he was seen by Dr. Barber his Hb was normal at 16.2 g/dl, HCT of 46.2. WBC was 5.5 with normal absolute differential counts and platelet count was 182. LFTS were normal, ferritin was 39. TSH was normal, LDH was normal. PSA was 0.42 and DAVID 2 mutation analysis was negative. At that time he was also diagnosed with MURRAY and his erythrocytosis was felt to be secondary to MURRAY and no phlebotomy was recommended. However, he went back to [facility] and in 2015 he was still having monthly phlebotomies to keep his HCT down although it sounds like HCT was in the mid 40s there. When we saw him in June 2015, his serum erythropoietin level was normal at 27. Hb was 14.1 and ferritin was low at 11. CT abdomen 6/18/2015 showed colonic diverticulosis without inflammatory change and elevation of L hemidiaphragm. Adrenals, spleen and pancreas and liver appeared normal.He has a Hx of CAD, s/p PTCIx2. He is active and exercises daily and swims. In June 2015,  have recommended that he discontinue phlebotomy sessions in [facility] and have his CBCd checked annually. He reports having an EGD in [facility] in May 2016 when after the biopsy he had an UGI bleed. He required repeat EGD and cauterization  of bleeding area, per patient. His Hb and ferritin fell per patient, and he was given 5 infusions of Monofer in [facility], he believes 100 mg each, IV. He felt better following the infusions.     History Of Present Illness:  Mr. Coelho is a 73 year old male who presents for f/up secondary erythrocytosis (secondary to sleep apnea) and iron deficiency. I last saw him in October 2018 and he spent a winter here and plans to go back to [facility] in the summer. His daughter lives in the .  He is already on ASA 81 mg PO daily. He has not tolerated oral iron (had constipation)  His ferritin has remained low and unchanged over at least the last 3 years, most recently as below:  Results for ADRIAN COELHO (MRN [id number]) as of 4/18/2019 14:46   Ref. Range 6/25/2015 08:21 8/8/2017 13:38 10/8/2018 11:08 4/16/2019 15:00   Ferritin Latest Ref Range: 26 - 388 ng/mL 11 (L) 16 (L) 10 (L) 11 (L)   Hemoglobin has remained normal at around 15 g/dL.  He has continued to use CPAP.  We have tried to give him IV ferric carboxymaltose but his insurance would not approve the infusion and he would incur out-of-pocket costs.  He declined proceeding with IV Injectafer if he had to pay out of pocket and plans to return to [facility] and see if he could get Injectafer over there. He has his Hb checked regularly in [facility], every two months and it has been in the normal range. He does feel fatigued and wishes he had more stamina and exercise endurance.  His bowel movements have been regular.  He is pain-free today.  Weight has been stable.  Besides wishing for more energy with exercise, he does not have any health related complaints today.  Unfortunately, his friend has recently passed away from lung cancer.  The patient was recently evaluated with his PCP and [facility] (outside medical records reviewed) for congestion and cough times 1 week in February 2019 and was treated with Zithromax.  He is a former smoker and is not on albuterol inhaler. Chest x-ray  "was done in February 2019 and demonstrated persistent mild left jaime-diaphragm elevation, and no active disease.  In addition, a complete 12 point  review of systems is negative.    Past Medical/Surgical History:  CAD, s/p PTCI  MURRAY  Secondary erythrocytosis  BPH   He has a Hx of chronic pancreatitis in the past. He has had a cholecystectomy in the past.  He has a Hx of diverticulits, recurrent x3 in the past.  He has a Hx of BPH, s/p TURP in [facility] and here has been following up with Dr. Infante.  He had a colonoscopy on 8/3/2017 - which showed Diverticulosis of colon and repeat was recommended in 10 years.     FHx and Soc Hx reviewed   He is a former smoker.    Past Surgical History:   Procedure Laterality Date     APPENDECTOMY       CYSTOSCOPY, TRANSURETHRAL RESECTION (TUR) PROSTATE, COMBINED     Cholecystectomy    Allergies:  Allergies as of 04/16/2019     (No Known Allergies)     Current Medications:  Current Outpatient Medications   Medication Sig Dispense Refill     aspirin 81 MG tablet Take  by mouth daily.       MAGNESIUM OXIDE PO        nebivolol (BYSTOLIC) 2.5 MG tablet Take 2.5 mg by mouth daily       Omega-3 Fatty Acids (OMEGA-3 FISH OIL PO) Take 1 g by mouth daily       pantoprazole (PROTONIX) 40 MG EC tablet Take 40 mg by mouth daily       Ranolazine (RANEXA PO)        rosuvastatin (CRESTOR) 20 MG tablet Take 40 mg by mouth daily        UNABLE TO FIND Take 2.5 mg by mouth daily. MEDICATION NAME: Nebivolol HCl.        nitroglycerin (NITROLINGUAL) 0.4 MG/SPRAY spray Place 1 spray under the tongue every 5 minutes as needed.       omeprazole (PRILOSEC) 20 MG capsule Take 1 capsule by mouth daily.          Physical Exam:   /77 (BP Location: Right arm)   Pulse 69   Temp 98.7  F (37.1  C) (Oral)   Resp 16   Ht 1.778 m (5' 10\")   Wt 108.9 kg (240 lb 2 oz)   SpO2 97%   BMI 34.45 kg/m             GENERAL APPEARANCE: healthy, alert and no distress     NECK:  no asymmetry or masses     RESP: lungs " clear to auscultation - no rales, rhonchi or wheezes     CARDIOVASCULAR: regular rates and rhythm, normal S1 S2, no S3 or S4 and no murmur.     ABDOMEN:  soft, nontender, no HSM or masses and bowel sounds normal     MUSCULOSKELETAL: extremities normal- no gross deformities noted, no evidence of inflammation in joints, FROM in all extremities. No edema b/l LE.     SKIN: no suspicious lesions or rashes     PSYCHIATRIC: mentation appears normal and affect normal    Laboratory/Imaging Studies  Results for orders placed or performed in visit on 04/16/19   Ferritin   Result Value Ref Range    Ferritin 11 (L) 26 - 388 ng/mL   Hemoglobin   Result Value Ref Range    Hemoglobin 15.0 13.3 - 17.7 g/dL     ASSESSMENT/PLAN:    Mr. Coelho is a very pleasant 73 year old with secondary erythrocytosis secondary to MURRAY. He has remained on  CPAP. Hb is normal but ferritin is low but he reports an UGI bleed in 2016 in [facility], followed by 5 doses of IV iron (Monofer, 100 mg each dose, per patient). He is feeling fatigued and has low exercise tolerance and is intolerant of oral iron. He would like to try a course of IV iron to see if it improves his fatigue.     1. Iron deficiency - Given his low ferritin level, I felt it was unreasonable to try one dose of IV Injectafer (750mg) but his insurance would not cover it for diagnosis of iron deficiency without anemia.  At this time patient is willing to try oral iron and I recommended that he start on ferrous sulfate supplementation 325 mg p.o. 3 times a week.  If he develops constipation he was encouraged to take stool softeners.  He is planning to have repeat Hb and ferritin levels done in [facility].  He will also inquire about the cost of Injectafer in [facility] and see if he would like to receive it there.  He will follow-up with us when he returns from [facility].  He prefers to call and make that appointment on his own.    2. Secondary erythrocytosis- He will continue on CPAP for sleep apnea. His  secondary erythrocytosis has resolved with CPAP use.  3.  F/up with Dr. Murillo for all his other general medical needs.  At the end of our visit patient verbalized understanding and concurred with the plan.            Again, thank you for allowing me to participate in the care of your patient.        Sincerely,        Rula Mo MD, MD

## 2019-04-19 ENCOUNTER — PATIENT OUTREACH (OUTPATIENT)
Dept: ONCOLOGY | Facility: CLINIC | Age: 74
End: 2019-04-19

## 2019-04-19 RX ORDER — ACETAMINOPHEN 325 MG/1
650 TABLET ORAL
Status: CANCELLED
Start: 2019-04-19

## 2019-04-19 RX ORDER — DIPHENHYDRAMINE HCL 25 MG
25 CAPSULE ORAL
Status: CANCELLED | OUTPATIENT
Start: 2019-04-19

## 2019-04-19 NOTE — PROGRESS NOTES
Difficult to determine out of pocket cost.  Infusion , Amandeep, can secure infusion through insurance but cannot give price.  Patient is able to call [facility] through [employer].  Writer discussed with Dr. Mo; she would be able to give him Venofer at least one out of two doses and this should help him feel better.  He can decide about second dose in the future.  Discussed with patient; he would like to receive two doses; he states he really would like to feel better.  As long as he can pay his out of pocket cost by his credit card, he would be fine with it.  He is currently scheduled for Monday, 4/22.

## 2019-04-19 NOTE — PROGRESS NOTES
Returned phone call to patient.  He is asking if he could receive half Injectafer dose (had provider visit on 4/16) and pay out of pocket as insurance will not cover cost for Injectafer if patient is not anemic (has low Ferritin but Hgb 15).  Patient states he is not been feeling well, very weak; leaving for [place] on May 1 and will probably not return until August he thinks.  Writer advised will discuss with Dr. Mo and call patient back with a plan.

## 2019-04-22 ENCOUNTER — INFUSION THERAPY VISIT (OUTPATIENT)
Dept: INFUSION THERAPY | Facility: CLINIC | Age: 74
End: 2019-04-22
Payer: MEDICARE

## 2019-04-22 VITALS
TEMPERATURE: 97.7 F | BODY MASS INDEX: 34.42 KG/M2 | RESPIRATION RATE: 18 BRPM | DIASTOLIC BLOOD PRESSURE: 79 MMHG | OXYGEN SATURATION: 98 % | SYSTOLIC BLOOD PRESSURE: 123 MMHG | WEIGHT: 239.9 LBS | HEART RATE: 77 BPM

## 2019-04-22 DIAGNOSIS — R53.83 OTHER FATIGUE: Primary | ICD-10-CM

## 2019-04-22 DIAGNOSIS — K90.89 OTHER INTESTINAL MALABSORPTION: ICD-10-CM

## 2019-04-22 DIAGNOSIS — E61.1 IRON DEFICIENCY: ICD-10-CM

## 2019-04-22 PROCEDURE — 96365 THER/PROPH/DIAG IV INF INIT: CPT | Mod: GZ | Performed by: NURSE PRACTITIONER

## 2019-04-22 PROCEDURE — 99207 ZZC NO CHARGE LOS: CPT

## 2019-04-22 RX ORDER — ACETAMINOPHEN 325 MG/1
650 TABLET ORAL
Status: CANCELLED
Start: 2019-04-24

## 2019-04-22 RX ORDER — DIPHENHYDRAMINE HCL 25 MG
25 CAPSULE ORAL
Status: CANCELLED | OUTPATIENT
Start: 2019-04-24

## 2019-04-22 RX ADMIN — Medication 250 ML: at 11:31

## 2019-04-22 ASSESSMENT — PAIN SCALES - GENERAL: PAINLEVEL: NO PAIN (0)

## 2019-04-22 NOTE — PROGRESS NOTES
Infusion Nursing Note:  Martell Coelho presents today for first IV Venofer.    Patient seen by provider today: No   present during visit today: Not Applicable.    Note: Patient met with patient  prior to getting infusion.    Orientated patient to the department, including when/how to use the call light.  Educated patient on signs of possible reaction.  Tolerated infusion without any difficulty.  Will return to clinic in 2 days for 2nd dose.    Patient will be traveling to [place] on 5/1/19.  States he will be there, for family issues, for 3-5 months.  Ginna, cancer coordinator, visited with patient during infusion today.    Intravenous Access:  Peripheral IV placed.    Treatment Conditions:  Not Applicable.      Post Infusion Assessment:  Patient tolerated infusion without incident.  Blood return noted pre and post infusion.  Site patent and intact, free from redness, edema or discomfort.  No evidence of extravasations.  Access discontinued per protocol.       Discharge Plan:   Patient will return 4/24/19 for next appointment.   Patient discharged in stable condition accompanied by: self.  Departure Mode: Ambulatory.    More Wheatley RN

## 2019-04-24 ENCOUNTER — INFUSION THERAPY VISIT (OUTPATIENT)
Dept: INFUSION THERAPY | Facility: CLINIC | Age: 74
End: 2019-04-24
Payer: MEDICARE

## 2019-04-24 VITALS
BODY MASS INDEX: 33.81 KG/M2 | WEIGHT: 235.6 LBS | TEMPERATURE: 97.7 F | DIASTOLIC BLOOD PRESSURE: 75 MMHG | SYSTOLIC BLOOD PRESSURE: 119 MMHG | RESPIRATION RATE: 18 BRPM | HEART RATE: 71 BPM | OXYGEN SATURATION: 95 %

## 2019-04-24 DIAGNOSIS — K90.89 OTHER INTESTINAL MALABSORPTION: ICD-10-CM

## 2019-04-24 DIAGNOSIS — E61.1 IRON DEFICIENCY: ICD-10-CM

## 2019-04-24 DIAGNOSIS — R53.83 OTHER FATIGUE: Primary | ICD-10-CM

## 2019-04-24 PROCEDURE — 96365 THER/PROPH/DIAG IV INF INIT: CPT | Mod: GZ | Performed by: NURSE PRACTITIONER

## 2019-04-24 PROCEDURE — 99207 ZZC NO CHARGE LOS: CPT

## 2019-04-24 RX ORDER — DIPHENHYDRAMINE HCL 25 MG
25 CAPSULE ORAL
Status: CANCELLED | OUTPATIENT
Start: 2019-04-26

## 2019-04-24 RX ORDER — ACETAMINOPHEN 325 MG/1
650 TABLET ORAL
Status: CANCELLED
Start: 2019-04-26

## 2019-04-24 RX ADMIN — Medication 250 ML: at 11:36

## 2019-04-24 ASSESSMENT — PAIN SCALES - GENERAL: PAINLEVEL: NO PAIN (0)

## 2019-04-24 NOTE — PROGRESS NOTES
Infusion Nursing Note:  Martell Coelho presents today for dose #2 of Venofer infusion.    Patient seen by provider today: No   present during visit today: Not Applicable.    Note: States the only change he has, since starting treatment on 4/22/19, is constipation.  Taking Miralax for constipation.  Not Applicable.Intravenous Access:  Peripheral IV placed.    Treatment Conditions:  Not Applicable.      Post Infusion Assessment:  Patient tolerated infusion without incident.  Blood return noted pre and post infusion.  Site patent and intact, free from redness, edema or discomfort.  No evidence of extravasations.  Access discontinued per protocol.       Discharge Plan:   Patient will be going out of country for 3-5 months.  Will follow up when [place] to the [place].  Patient discharged in stable condition accompanied by: self.  Departure Mode: Ambulatory.    More Wheatley RN

## 2019-10-02 ENCOUNTER — HEALTH MAINTENANCE LETTER (OUTPATIENT)
Age: 74
End: 2019-10-02

## 2019-11-20 ENCOUNTER — TELEPHONE (OUTPATIENT)
Dept: SLEEP MEDICINE | Facility: CLINIC | Age: 74
End: 2019-11-20

## 2019-11-20 NOTE — TELEPHONE ENCOUNTER
Called and left a voice mail  Message with her daughter to return [facility] call for scheduling and appointment [phone].

## 2019-11-21 ENCOUNTER — APPOINTMENT (OUTPATIENT)
Age: 74
Setting detail: DERMATOLOGY
End: 2019-11-21

## 2019-11-21 DIAGNOSIS — D22 MELANOCYTIC NEVI: ICD-10-CM

## 2019-11-21 DIAGNOSIS — L72.8 OTHER FOLLICULAR CYSTS OF THE SKIN AND SUBCUTANEOUS TISSUE: ICD-10-CM

## 2019-11-21 DIAGNOSIS — L82.0 INFLAMED SEBORRHEIC KERATOSIS: ICD-10-CM

## 2019-11-21 DIAGNOSIS — L57.8 OTHER SKIN CHANGES DUE TO CHRONIC EXPOSURE TO NONIONIZING RADIATION: ICD-10-CM

## 2019-11-21 DIAGNOSIS — Z85.828 PERSONAL HISTORY OF OTHER MALIGNANT NEOPLASM OF SKIN: ICD-10-CM

## 2019-11-21 PROBLEM — D22.5 MELANOCYTIC NEVI OF TRUNK: Status: ACTIVE | Noted: 2019-11-21

## 2019-11-21 PROCEDURE — OTHER BENIGN DESTRUCTION: OTHER

## 2019-11-21 PROCEDURE — 99214 OFFICE O/P EST MOD 30 MIN: CPT | Mod: 25

## 2019-11-21 PROCEDURE — OTHER COUNSELING: OTHER

## 2019-11-21 PROCEDURE — 17110 DESTRUCT B9 LESION 1-14: CPT

## 2019-11-21 PROCEDURE — OTHER REASSURANCE: OTHER

## 2019-11-21 ASSESSMENT — LOCATION DETAILED DESCRIPTION DERM
LOCATION DETAILED: LEFT ANTERIOR PROXIMAL THIGH
LOCATION DETAILED: LEFT POSTERIOR ANKLE
LOCATION DETAILED: RIGHT ANTERIOR PROXIMAL UPPER ARM
LOCATION DETAILED: LEFT ANTERIOR PROXIMAL UPPER ARM
LOCATION DETAILED: SUPERIOR THORACIC SPINE
LOCATION DETAILED: RIGHT POSTERIOR SHOULDER
LOCATION DETAILED: LEFT INFERIOR CENTRAL MALAR CHEEK

## 2019-11-21 ASSESSMENT — LOCATION ZONE DERM
LOCATION ZONE: LEG
LOCATION ZONE: ARM
LOCATION ZONE: FACE
LOCATION ZONE: TRUNK

## 2019-11-21 ASSESSMENT — LOCATION SIMPLE DESCRIPTION DERM
LOCATION SIMPLE: LEFT UPPER ARM
LOCATION SIMPLE: LEFT THIGH
LOCATION SIMPLE: UPPER BACK
LOCATION SIMPLE: LEFT ANKLE
LOCATION SIMPLE: LEFT CHEEK
LOCATION SIMPLE: RIGHT UPPER ARM
LOCATION SIMPLE: RIGHT SHOULDER

## 2019-11-21 NOTE — PROGRESS NOTES
Hematology Follow-up visit:  Date on this visit: Dec 5, 2019    Primary Care Physician: Marli Daly.    Diagnosis:   1. Secondary Erythrocytosis   He transferred his care to me in June 2015, due to Dr. Barber leaving our practice. He has originally met with Dr. Barber in 07/2013 when he was evaluated for erythrocytosis. He reports that in 2013 in [facility] he was noted to have Hb ede 18-20 range and underwent three phlebotomies every other day, 500 cc removed each time. These were associated with lightheadedness. When he was seen by Dr. Barber his Hb was normal at 16.2 g/dl, HCT of 46.2. WBC was 5.5 with normal absolute differential counts and platelet count was 182. LFTS were normal, ferritin was 39. TSH was normal, LDH was normal. PSA was 0.42 and DAVID 2 mutation analysis was negative. At that time he was also diagnosed with MURRAY and his erythrocytosis was felt to be secondary to MURRAY and no phlebotomy was recommended. However, he went back to [facility] and in 2015 he was still having monthly phlebotomies to keep his HCT down although it sounds like HCT was in the mid 40s there. When we saw him in June 2015, his serum erythropoietin level was normal at 27. Hb was 14.1 and ferritin was low at 11. CT abdomen 6/18/2015 showed colonic diverticulosis without inflammatory change and elevation of L hemidiaphragm. Adrenals, spleen and pancreas and liver appeared normal.He has a Hx of CAD, s/p PTCIx2. He is active and exercises daily and swims. In June 2015,  have recommended that he discontinue phlebotomy sessions in [facility] and have his CBCd checked annually. He reports having an EGD in [facility] in May 2016 when after the biopsy he had an UGI bleed. He required repeat EGD and cauterization of bleeding area, per patient. His Hb and ferritin fell per patient, and he was given 5 infusions of Monofer in [facility], he believes 100 mg each, IV. He felt better following the infusions.     History Of Present Illness:  Mr. Coelho is a 74  year old male who presents for f/up secondary erythrocytosis (secondary to sleep apnea) and iron deficiency. His daughter lives in the .  He is already on ASA 81 mg PO daily.  His ferritin has remained low and unchanged over at least the last 3 years, most recently as below:  Results for ADRIAN BELL (MRN [id number]) as of 12/5/2019 12:09   Ref. Range 8/8/2017 13:38 10/8/2018 11:08 4/16/2019 15:00 12/5/2019 11:08   Ferritin Latest Ref Range: 26 - 388 ng/mL 16 (L) 10 (L) 11 (L) 13 (L)     Hemoglobin has remained normal at around 15 g/dL.    Results for ADRIAN BELL (MRN [id number]) as of 12/5/2019 12:09   Ref. Range 8/8/2017 13:38 10/8/2018 11:08 4/16/2019 15:00 12/5/2019 11:08   Hemoglobin Latest Ref Range: 13.3 - 17.7 g/dL 16.0 15.3 15.0 14.6   He has continued to use CPAP.  We have tried to give him IV ferric carboxymaltose but his insurance would not approve the infusion and he would incur out-of-pocket costs.  He declined proceeding with IV Injectafer if he had to pay out of pocket.  He did decide to proceed with 2 doses of Venofer in April 2019, 300 mg each.  Afterwards, he went to [facility] and his ferritin was up to 100 per patient in May 2019 and down to 15 June 2019.  Apparently he then had hemoglobin checked in July 2019 in [facility], and he was prescribed 3 courses of phlebotomy with 400 cc were removed each time weekly.  He has not had any oral or IV iron supplementation since.  He recently got over a URI.  He has his Hb checked regularly in [facility], and most recently in November that was normal at 14g/dl.  He felt significantly better after IV iron infusion but now does feel slightly fatigued.  His bowel movements have been regular.  He is pain-free today.  He has occasional trouble sleeping.    His weight has been stable at around 240-242 pounds.  In addition, a complete 12 point  review of systems is negative.    Past Medical/Surgical History:  CAD, s/p PTCI  MURRAY  Secondary erythrocytosis  BPH   He has  a Hx of chronic pancreatitis in the past. He has had a cholecystectomy in the past.  He has a Hx of diverticulits, recurrent x3 in the past.  He has a Hx of BPH, s/p TURP in [facility] and here has been following up with Dr. Infante.  He had a colonoscopy on 8/3/2017 - which showed Diverticulosis of colon and repeat was recommended in 10 years.     FHx and Soc Hx reviewed   He is a former smoker.    Past Surgical History:   Procedure Laterality Date     APPENDECTOMY       CYSTOSCOPY, TRANSURETHRAL RESECTION (TUR) PROSTATE, COMBINED     Cholecystectomy    Allergies:  Allergies as of 12/05/2019     (No Known Allergies)     Current Medications:  Current Outpatient Medications   Medication Sig Dispense Refill     aspirin 81 MG tablet Take  by mouth daily.       MAGNESIUM OXIDE PO        nebivolol (BYSTOLIC) 2.5 MG tablet Take 2.5 mg by mouth daily       nitroglycerin (NITROLINGUAL) 0.4 MG/SPRAY spray Place 1 spray under the tongue every 5 minutes as needed.       Omega-3 Fatty Acids (OMEGA-3 FISH OIL PO) Take 1 g by mouth daily       omeprazole (PRILOSEC) 20 MG capsule Take 1 capsule by mouth daily.       pantoprazole (PROTONIX) 40 MG EC tablet Take 40 mg by mouth daily       Ranolazine (RANEXA PO)        rosuvastatin (CRESTOR) 20 MG tablet Take 40 mg by mouth daily        UNABLE TO FIND Take 2.5 mg by mouth daily. MEDICATION NAME: Nebivolol HCl.           Physical Exam:   BP (!) 149/84 (BP Location: Right arm, Patient Position: Sitting, Cuff Size: Adult Regular)   Pulse 68   Temp 98.7  F (37.1  C) (Oral)   Resp 16   Wt 109.8 kg (242 lb)   SpO2 97%   BMI 34.72 kg/m      Wt Readings from Last 5 Encounters:   12/05/19 109.8 kg (242 lb)   04/24/19 106.9 kg (235 lb 9.6 oz)   04/22/19 108.8 kg (239 lb 14.4 oz)   04/16/19 108.9 kg (240 lb 2 oz)   04/08/19 109.8 kg (242 lb)           GENERAL APPEARANCE: healthy, alert and no distress     NECK:  no asymmetry or masses     RESP: lungs clear to auscultation - no rales, rhonchi  or wheezes     CARDIOVASCULAR: regular rates and rhythm, normal S1 S2, no S3 or S4 and no murmur.     ABDOMEN:  soft, nontender, no HSM or masses and bowel sounds normal     MUSCULOSKELETAL: extremities normal- no gross deformities noted, no evidence of inflammation in joints, FROM in all extremities. No edema b/l LE.     SKIN: no suspicious lesions or rashes     PSYCHIATRIC: mentation appears normal and affect normal    Laboratory/Imaging Studies  Orders Only on 12/05/2019   Component Date Value Ref Range Status     Ferritin 12/05/2019 13* 26 - 388 ng/mL Final     Hemoglobin 12/05/2019 14.6  13.3 - 17.7 g/dL Final       ASSESSMENT/PLAN:    Mr. Coelho is a very pleasant 74 year old with secondary erythrocytosis secondary to MURRAY. He has remained on  CPAP, with correction of his hemoglobin.. Hb is normal but ferritin is low but he reports an UGI bleed in 2016 in [facility], followed by 5 doses of IV iron (Monofer, 100 mg each dose, per patient).   He did respond to IV a total of 600 mg of Venofer which he received in April 2019, but subsequently had phlebotomy in [facility] for reportedly symptomatic erythrocytosis in July 2019 which negated the effect of IV Venofer.     1. Iron deficiency - Given his low ferritin level, I would recommend that he try oral ferrous sulfate 325 mg 3 times a week.  We will hold off any IV iron supplementation since he has experienced apparently erythrocytosis although unclear if this was related to dehydration or IV iron since ferritin was down to 50 and below at that time.  Recommend that the patient has his hemoglobin and ferritin rechecked in 6 months after staying on oral iron supplementation.  Orders were placed.  2. Secondary erythrocytosis- He will continue on CPAP for sleep apnea. His secondary erythrocytosis has resolved with CPAP use.  3. He used to follow with Dr. Murillo but is looking to establish care with new PCP presently.   At the end of our visit patient verbalized understanding  and concurred with the plan.    Addendum:   Pt with Hb of 17.1 g/dl per patient at outside lab. He only had JaK2 tested before. We'll also test for CALR and MPL to evaluate for primary erythrocytosis, repeat serum erythropoietin level and recheck CBC and ferritin and see the patient back in f/up. Advised to wear CPAP regularly.

## 2019-11-21 NOTE — PROCEDURE: BENIGN DESTRUCTION
Anesthesia Volume In Cc: 0.5
Consent: The patient's consent was obtained including but not limited to risks of crusting, scabbing, blistering, scarring, darker or lighter pigmentary change, recurrence, incomplete removal and infection.
Render Post-Care Instructions In Note?: no
Medical Necessity Clause: This procedure was medically necessary because the lesions that were treated were:
Treatment Number (Will Not Render If 0): 0
Post-Care Instructions: I reviewed with the patient in detail post-care instructions. Patient is to wear sunprotection, and avoid picking at any of the treated lesions. Pt may apply Vaseline to crusted or scabbing areas.
Medical Necessity Information: It is in your best interest to select a reason for this procedure from the list below. All of these items fulfill various CMS LCD requirements except the new and changing color options.
Detail Level: Simple

## 2019-11-26 DIAGNOSIS — G47.33 OBSTRUCTIVE SLEEP APNEA (ADULT) (PEDIATRIC): Primary | ICD-10-CM

## 2019-12-04 ENCOUNTER — DOCUMENTATION ONLY (OUTPATIENT)
Dept: SLEEP MEDICINE | Facility: CLINIC | Age: 74
End: 2019-12-04

## 2019-12-04 NOTE — PROGRESS NOTES
Patient was offered choice of vendor and chose [facility].  Patient Martell Coeloh was set up at [place] on December 4, 2019. Patient received a replacement  Resmed AirSense 10 Auto. Pressures were set at 6-11 cm H2O.   Patient s ramp is 5cm H2O for Off and FLEX/EPR is EPR, 2.  Patient received a Resmed Mask name: Mirage FX  Nasal mask size Medium, heated tubing and heated humidifier.  Patient is enrolled in the STM Program and does need to meet compliance. Patient has a follow up on 1/7/2020 with Dr. Woody.    Moiz Coombs

## 2019-12-05 ENCOUNTER — ONCOLOGY VISIT (OUTPATIENT)
Dept: ONCOLOGY | Facility: CLINIC | Age: 74
End: 2019-12-05
Payer: MEDICARE

## 2019-12-05 VITALS
OXYGEN SATURATION: 97 % | RESPIRATION RATE: 16 BRPM | WEIGHT: 242 LBS | HEART RATE: 68 BPM | BODY MASS INDEX: 34.72 KG/M2 | SYSTOLIC BLOOD PRESSURE: 149 MMHG | TEMPERATURE: 98.7 F | DIASTOLIC BLOOD PRESSURE: 84 MMHG

## 2019-12-05 DIAGNOSIS — D75.1 ERYTHROCYTOSIS: ICD-10-CM

## 2019-12-05 DIAGNOSIS — D75.1 SECONDARY ERYTHROCYTOSIS: ICD-10-CM

## 2019-12-05 DIAGNOSIS — E61.1 IRON DEFICIENCY: ICD-10-CM

## 2019-12-05 DIAGNOSIS — E61.1 IRON DEFICIENCY: Primary | ICD-10-CM

## 2019-12-05 LAB
FERRITIN SERPL-MCNC: 13 NG/ML (ref 26–388)
HGB BLD-MCNC: 14.6 G/DL (ref 13.3–17.7)

## 2019-12-05 PROCEDURE — 99213 OFFICE O/P EST LOW 20 MIN: CPT | Performed by: INTERNAL MEDICINE

## 2019-12-05 PROCEDURE — 36415 COLL VENOUS BLD VENIPUNCTURE: CPT | Performed by: INTERNAL MEDICINE

## 2019-12-05 PROCEDURE — 82728 ASSAY OF FERRITIN: CPT | Performed by: INTERNAL MEDICINE

## 2019-12-05 PROCEDURE — 85018 HEMOGLOBIN: CPT | Performed by: INTERNAL MEDICINE

## 2019-12-05 ASSESSMENT — PAIN SCALES - GENERAL: PAINLEVEL: NO PAIN (0)

## 2019-12-05 NOTE — NURSING NOTE
"Oncology Rooming Note    December 5, 2019 12:08 PM   Maretll Coelho is a 74 year old male who presents for:    Chief Complaint   Patient presents with     Oncology Clinic Visit     follow up     Initial Vitals: BP (!) 149/84 (BP Location: Right arm, Patient Position: Sitting, Cuff Size: Adult Regular)   Pulse 68   Temp 98.7  F (37.1  C) (Oral)   Resp 16   Wt 109.8 kg (242 lb)   SpO2 97%   BMI 34.72 kg/m   Estimated body mass index is 34.72 kg/m  as calculated from the following:    Height as of 4/16/19: 1.778 m (5' 10\").    Weight as of this encounter: 109.8 kg (242 lb). Body surface area is 2.33 meters squared.  No Pain (0) Comment: Data Unavailable   No LMP for male patient.  Allergies reviewed: No  Medications reviewed: No    Medications: Medication refills not needed today.  Pharmacy name entered into EPIC: Data Unavailable      Reva Plolock RN              "

## 2019-12-05 NOTE — LETTER
12/5/2019         RE: Martell Coelho  [id number]  [place] [facility] [id number]        Dear Colleague,    Thank you for referring your patient, Martell Coelho, to the [facility]. Please see a copy of my visit note below.    Hematology Follow-up visit:  Date on this visit: Dec 5, 2019    Primary Care Physician: Marli Daly.    Diagnosis:   1. Secondary Erythrocytosis   He transferred his care to me in June 2015, due to Dr. Barber leaving our practice. He has originally met with Dr. Barber in 07/2013 when he was evaluated for erythrocytosis. He reports that in 2013 in [facility] he was noted to have Hb ede 18-20 range and underwent three phlebotomies every other day, 500 cc removed each time. These were associated with lightheadedness. When he was seen by Dr. Barber his Hb was normal at 16.2 g/dl, HCT of 46.2. WBC was 5.5 with normal absolute differential counts and platelet count was 182. LFTS were normal, ferritin was 39. TSH was normal, LDH was normal. PSA was 0.42 and DAVID 2 mutation analysis was negative. At that time he was also diagnosed with MURRAY and his erythrocytosis was felt to be secondary to MURRAY and no phlebotomy was recommended. However, he went back to [facility] and in 2015 he was still having monthly phlebotomies to keep his HCT down although it sounds like HCT was in the mid 40s there. When we saw him in June 2015, his serum erythropoietin level was normal at 27. Hb was 14.1 and ferritin was low at 11. CT abdomen 6/18/2015 showed colonic diverticulosis without inflammatory change and elevation of L hemidiaphragm. Adrenals, spleen and pancreas and liver appeared normal.He has a Hx of CAD, s/p PTCIx2. He is active and exercises daily and swims. In June 2015,  have recommended that he discontinue phlebotomy sessions in [facility] and have his CBCd checked annually. He reports having an EGD in [facility] in May 2016 when after the biopsy he had an UGI bleed. He required repeat EGD and cauterization of  bleeding area, per patient. His Hb and ferritin fell per patient, and he was given 5 infusions of Monofer in [facility], he believes 100 mg each, IV. He felt better following the infusions.     History Of Present Illness:  Mr. Coelho is a 74 year old male who presents for f/up secondary erythrocytosis (secondary to sleep apnea) and iron deficiency. His daughter lives in the .  He is already on ASA 81 mg PO daily.  His ferritin has remained low and unchanged over at least the last 3 years, most recently as below:  Results for ADRIAN COELHO (MRN [id number]) as of 12/5/2019 12:09   Ref. Range 8/8/2017 13:38 10/8/2018 11:08 4/16/2019 15:00 12/5/2019 11:08   Ferritin Latest Ref Range: 26 - 388 ng/mL 16 (L) 10 (L) 11 (L) 13 (L)     Hemoglobin has remained normal at around 15 g/dL.    Results for ADRIAN COELHO (MRN [id number]) as of 12/5/2019 12:09   Ref. Range 8/8/2017 13:38 10/8/2018 11:08 4/16/2019 15:00 12/5/2019 11:08   Hemoglobin Latest Ref Range: 13.3 - 17.7 g/dL 16.0 15.3 15.0 14.6   He has continued to use CPAP.  We have tried to give him IV ferric carboxymaltose but his insurance would not approve the infusion and he would incur out-of-pocket costs.  He declined proceeding with IV Injectafer if he had to pay out of pocket.  He did decide to proceed with 2 doses of Venofer in April 2019, 300 mg each.  Afterwards, he went to [facility] and his ferritin was up to 100 per patient in May 2019 and down to 15 Melissa 2019.  Apparently he then had hemoglobin checked in July 2019 in [facility], and he was prescribed 3 courses of phlebotomy with 400 cc were removed each time weekly.  He has not had any oral or IV iron supplementation since.  He recently got over a URI.  He has his Hb checked regularly in [facility], and most recently in November that was normal at 14g/dl.  He felt significantly better after IV iron infusion but now does feel slightly fatigued.  His bowel movements have been regular.  He is pain-free today.  He has  occasional trouble sleeping.    His weight has been stable at around 240-242 pounds.  In addition, a complete 12 point  review of systems is negative.    Past Medical/Surgical History:  CAD, s/p PTCI  MURRAY  Secondary erythrocytosis  BPH   He has a Hx of chronic pancreatitis in the past. He has had a cholecystectomy in the past.  He has a Hx of diverticulits, recurrent x3 in the past.  He has a Hx of BPH, s/p TURP in [facility] and here has been following up with Dr. Infante.  He had a colonoscopy on 8/3/2017 - which showed Diverticulosis of colon and repeat was recommended in 10 years.     FHx and Soc Hx reviewed   He is a former smoker.    Past Surgical History:   Procedure Laterality Date     APPENDECTOMY       CYSTOSCOPY, TRANSURETHRAL RESECTION (TUR) PROSTATE, COMBINED     Cholecystectomy    Allergies:  Allergies as of 12/05/2019     (No Known Allergies)     Current Medications:  Current Outpatient Medications   Medication Sig Dispense Refill     aspirin 81 MG tablet Take  by mouth daily.       MAGNESIUM OXIDE PO        nebivolol (BYSTOLIC) 2.5 MG tablet Take 2.5 mg by mouth daily       nitroglycerin (NITROLINGUAL) 0.4 MG/SPRAY spray Place 1 spray under the tongue every 5 minutes as needed.       Omega-3 Fatty Acids (OMEGA-3 FISH OIL PO) Take 1 g by mouth daily       omeprazole (PRILOSEC) 20 MG capsule Take 1 capsule by mouth daily.       pantoprazole (PROTONIX) 40 MG EC tablet Take 40 mg by mouth daily       Ranolazine (RANEXA PO)        rosuvastatin (CRESTOR) 20 MG tablet Take 40 mg by mouth daily        UNABLE TO FIND Take 2.5 mg by mouth daily. MEDICATION NAME: Nebivolol HCl.           Physical Exam:   BP (!) 149/84 (BP Location: Right arm, Patient Position: Sitting, Cuff Size: Adult Regular)   Pulse 68   Temp 98.7  F (37.1  C) (Oral)   Resp 16   Wt 109.8 kg (242 lb)   SpO2 97%   BMI 34.72 kg/m       Wt Readings from Last 5 Encounters:   12/05/19 109.8 kg (242 lb)   04/24/19 106.9 kg (235 lb 9.6 oz)    04/22/19 108.8 kg (239 lb 14.4 oz)   04/16/19 108.9 kg (240 lb 2 oz)   04/08/19 109.8 kg (242 lb)           GENERAL APPEARANCE: healthy, alert and no distress     NECK:  no asymmetry or masses     RESP: lungs clear to auscultation - no rales, rhonchi or wheezes     CARDIOVASCULAR: regular rates and rhythm, normal S1 S2, no S3 or S4 and no murmur.     ABDOMEN:  soft, nontender, no HSM or masses and bowel sounds normal     MUSCULOSKELETAL: extremities normal- no gross deformities noted, no evidence of inflammation in joints, FROM in all extremities. No edema b/l LE.     SKIN: no suspicious lesions or rashes     PSYCHIATRIC: mentation appears normal and affect normal    Laboratory/Imaging Studies  Orders Only on 12/05/2019   Component Date Value Ref Range Status     Ferritin 12/05/2019 13* 26 - 388 ng/mL Final     Hemoglobin 12/05/2019 14.6  13.3 - 17.7 g/dL Final       ASSESSMENT/PLAN:    Mr. Coelho is a very pleasant 74 year old with secondary erythrocytosis secondary to MURRAY. He has remained on  CPAP, with correction of his hemoglobin.. Hb is normal but ferritin is low but he reports an UGI bleed in 2016 in [facility], followed by 5 doses of IV iron (Monofer, 100 mg each dose, per patient).   He did respond to IV a total of 600 mg of Venofer which he received in April 2019, but subsequently had phlebotomy in [facility] for reportedly symptomatic erythrocytosis in July 2019 which negated the effect of IV Venofer.     1. Iron deficiency - Given his low ferritin level, I would recommend that he try oral ferrous sulfate 325 mg 3 times a week.  We will hold off any IV iron supplementation since he has experienced apparently erythrocytosis although unclear if this was related to dehydration or IV iron since ferritin was down to 50 and below at that time.  Recommend that the patient has his hemoglobin and ferritin rechecked in 6 months after staying on oral iron supplementation.  Orders were placed.  2. Secondary erythrocytosis-  He will continue on CPAP for sleep apnea. His secondary erythrocytosis has resolved with CPAP use.  3. He used to follow with Dr. Murillo but is looking to establish care with new PCP presently.   At the end of our visit patient verbalized understanding and concurred with the plan.            Again, thank you for allowing me to participate in the care of your patient.        Sincerely,        Rula Mo MD, MD

## 2019-12-09 ENCOUNTER — DOCUMENTATION ONLY (OUTPATIENT)
Dept: SLEEP MEDICINE | Facility: CLINIC | Age: 74
End: 2019-12-09

## 2019-12-09 NOTE — PROGRESS NOTES
3 DAY STM VISIT    Patient contacted for 3 day STM visit  Message left for patient to return call    Replacement device: Yes  STM ordered by provider: Yes     Device type: Auto-CPAP  PAP settings from order::  CPAP min 6 cm  H20       CPAP max 11 cm  H20  Mask type:    Nasal Mask     Device settings from machine      Min CPAP 6.0            Max CPAP 11.0      Assessment: Nightly usage, most nights under four hours.  Action plan: Patient to have 14 day STM visit. Patient has a follow up visit scheduled:   yes within 31-90 days of set up.    Total time spent on accessing, reviewing and interpreting remote patient PAP therapy data:   10 minutes      Total time spent with direct patient communication :   1 minutes

## 2019-12-15 ENCOUNTER — HEALTH MAINTENANCE LETTER (OUTPATIENT)
Age: 74
End: 2019-12-15

## 2019-12-19 ENCOUNTER — DOCUMENTATION ONLY (OUTPATIENT)
Dept: SLEEP MEDICINE | Facility: CLINIC | Age: 74
End: 2019-12-19

## 2019-12-19 NOTE — PROGRESS NOTES
14  DAY STM VISIT    Diagnostic AHI: 7.2   PSG    Message left for patient to return call     Assessment: Pt not meeting objective benchmarks for compliance       Action plan: waiting for patient to return call.  and pt to have 30 day STM visit.      Device type: Auto-CPAP    PAP settings: CPAP min 6.0 cm  H20       CPAP max 11.0 cm  H20           95th% pressure 10.5 cm  H20     Mask type:  Nasal Mask    Objective measures: 14 day rolling measures      Compliance  35 %      Leak  25.02 lpm  last  upload      AHI 0.52   last  upload      Average number of minutes 208      Objective measure goal  Compliance   Goal >70%  Leak   Goal < 24 lpm  AHI  Goal < 5  Usage  Goal >240      Total time spent on accessing, reviewing and interpreting remote patient PAP therapy data:   15 minutes      Total time spent with direct patient communication :   1 minutes

## 2020-01-06 ENCOUNTER — DOCUMENTATION ONLY (OUTPATIENT)
Dept: SLEEP MEDICINE | Facility: CLINIC | Age: 75
End: 2020-01-06

## 2020-01-06 NOTE — PROGRESS NOTES
30 DAY [facility] VISIT    Diagnostic AHI: 7.2    PSG    Data only recheck-replacement device.      Assessment: Pt meeting objective benchmarks.   Leak is slightly elevated.   Action plan:   Patient has scheduled a follow up visit with Dr. Woody on 1/7/2020.   Device type: Auto-CPAP  PAP settings: CPAP min 6.0 cm  H20     CPAP max 11.0 cm  H20    95th% pressure 10.8 cm  H20   Mask type:  Nasal Mask  Objective measures: 14 day rolling measures      Compliance  71 %      Leak  29.12 lpm  last  upload      AHI 0.71   last  upload      Average number of minutes 319      Objective measure goal  Compliance   Goal >70%  Leak   Goal < 24 lpm  AHI  Goal < 5  Usage  Goal >240         Total time spent on accessing, reviewing and interpreting remote patient PAP therapy data:   10 minutes      Total time spent with direct patient communication :   0 minutes    Total time spent on  remote patient monitoring analysis for last 30 days:   30 min

## 2020-01-07 ENCOUNTER — OFFICE VISIT (OUTPATIENT)
Dept: SLEEP MEDICINE | Facility: CLINIC | Age: 75
End: 2020-01-07
Payer: MEDICARE

## 2020-01-07 VITALS
SYSTOLIC BLOOD PRESSURE: 134 MMHG | OXYGEN SATURATION: 96 % | RESPIRATION RATE: 16 BRPM | WEIGHT: 243 LBS | HEIGHT: 71 IN | BODY MASS INDEX: 34.02 KG/M2 | DIASTOLIC BLOOD PRESSURE: 80 MMHG | HEART RATE: 72 BPM

## 2020-01-07 DIAGNOSIS — G47.33 OSA (OBSTRUCTIVE SLEEP APNEA): Primary | ICD-10-CM

## 2020-01-07 PROCEDURE — 99213 OFFICE O/P EST LOW 20 MIN: CPT | Performed by: INTERNAL MEDICINE

## 2020-01-07 ASSESSMENT — MIFFLIN-ST. JEOR: SCORE: 1872.24

## 2020-01-07 NOTE — PATIENT INSTRUCTIONS
Your BMI is Body mass index is 34.02 kg/m .  Weight management is a personal decision.  If you are interested in exploring weight loss strategies, the following discussion covers the approaches that may be successful. Body mass index (BMI) is one way to tell whether you are at a healthy weight, overweight, or obese. It measures your weight in relation to your height.  A BMI of 18.5 to 24.9 is in the healthy range. A person with a BMI of 25 to 29.9 is considered overweight, and someone with a BMI of 30 or greater is considered obese. More than two-thirds of American adults are considered overweight or obese.  Being overweight or obese increases the risk for further weight gain. Excess weight may lead to heart disease and diabetes.  Creating and following plans for healthy eating and physical activity may help you improve your health.  Weight control is part of healthy lifestyle and includes exercise, emotional health, and healthy eating habits. Careful eating habits lifelong are the mainstay of weight control. Though there are significant health benefits from weight loss, long-term weight loss with diet alone may be very difficult to achieve- studies show long-term success with dietary management in less than 10% of people. Attaining a healthy weight may be especially difficult to achieve in those with severe obesity. In some cases, medications, devices and surgical management might be considered.  What can you do?  If you are overweight or obese and are interested in methods for weight loss, you should discuss this with your provider.     Consider reducing daily calorie intake by 500 calories.     Keep a food journal.     Avoiding skipping meals, consider cutting portions instead.    Diet combined with exercise helps maintain muscle while optimizing fat loss. Strength training is particularly important for building and maintaining muscle mass. Exercise helps reduce stress, increase energy, and improves fitness.  Increasing exercise without diet control, however, may not burn enough calories to loose weight.       Start walking three days a week 10-20 minutes at a time    Work towards walking thirty minutes five days a week     Eventually, increase the speed of your walking for 1-2 minutes at time    In addition, we recommend that you review healthy lifestyles and methods for weight loss available through the National Institutes of Health patient information sites:  http://win.niddk.nih.gov/publications/index.htm    And look into health and wellness programs that may be available through your health insurance provider, employer, local community center, or laura club.    Weight management plan: Patient was referred to their PCP to discuss a diet and exercise plan.        Your Body mass index is 34.02 kg/m .  Weight management is a personal decision.  If you are interested in exploring weight loss strategies, the following discussion covers the approaches that may be successful. Body mass index (BMI) is one way to tell whether you are at a healthy weight, overweight, or obese. It measures your weight in relation to your height.  A BMI of 18.5 to 24.9 is in the healthy range. A person with a BMI of 25 to 29.9 is considered overweight, and someone with a BMI of 30 or greater is considered obese. More than two-thirds of American adults are considered overweight or obese.  Being overweight or obese increases the risk for further weight gain. Excess weight may lead to heart disease and diabetes.  Creating and following plans for healthy eating and physical activity may help you improve your health.  Weight control is part of healthy lifestyle and includes exercise, emotional health, and healthy eating habits. Careful eating habits lifelong are the mainstay of weight control. Though there are significant health benefits from weight loss, long-term weight loss with diet alone may be very difficult to achieve- studies show long-term  success with dietary management in less than 10% of people. Attaining a healthy weight may be especially difficult to achieve in those with severe obesity. In some cases, medications, devices and surgical management might be considered.  What can you do?  If you are overweight or obese and are interested in methods for weight loss, you should discuss this with your provider.     Consider reducing daily calorie intake by 500 calories.     Keep a food journal.     Avoiding skipping meals, consider cutting portions instead.    Diet combined with exercise helps maintain muscle while optimizing fat loss. Strength training is particularly important for building and maintaining muscle mass. Exercise helps reduce stress, increase energy, and improves fitness. Increasing exercise without diet control, however, may not burn enough calories to loose weight.       Start walking three days a week 10-20 minutes at a time    Work towards walking thirty minutes five days a week     Eventually, increase the speed of your walking for 1-2 minutes at time    In addition, we recommend that you review healthy lifestyles and methods for weight loss available through the National Institutes of Health patient information sites:  http://win.niddk.nih.gov/publications/index.htm    And look into health and wellness programs that may be available through your health insurance provider, employer, local community center, or laura club.    {Weight Management Plan -- Delete if patient has a normal BMI:494464}

## 2020-01-07 NOTE — PROGRESS NOTES
"  Obstructive Sleep Apnea - PAP Follow-Up Visit:    Chief Complaint   Patient presents with     CPAP Follow Up       Martell Coelho comes in today for follow-up of their mild sleep apnea, managed with CPAP.      PSG from 7/27/2017 showed an AHI of 7.2 per hour. Another PSG in 2013 had shown AHI of 22.3 per hour.     Medical history is significant for hypertension and coronary artery disease.     Overall, he rates the experience with PAP as 9 (0 poor, 10 great). The mask is comfortable. The mask is not leaking.  He is not snoring with the mask on. He is not having gasp arousals.  He is not having significant oral/nasal dryness. The pressure settings are comfortable.     He uses nasal mask.     Bedtime is typically 10:30 pm. Usually it takes about 5 minutes to fall asleep with the mask on. Wake time is typically 7 am.  Patient is using PAP therapy 5 hours per night. The patient is usually getting 7-8 hours of sleep per night.    He does feel rested in the morning.     Total score - [place]: 3 (1/7/2020 11:27 AM)    [employer]     Auto-PAP 6-11 cmH2O download:  29/30 total days of use. 1 nonuse days. 18/30 days with >4 hours use.  Average use 4 hours 39 minutes per day. Median Leak 5 L/min. 95%ile Leak 29 L/min. CPAP 95% pressure 10.7cm. 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     -  Tolerating PAP well. Daytime symptoms are improved. Normal AHI is demonstrated on download.     Plan:     1. Continue auto PAP therapy     Martell Coelho will follow up in about 1 year(s).     Fifteen minutes spent with patient, all of which were spent face-to-face counseling, consulting, coordinating plan of care.      Constantin Woody MD, MD    CC:  Javier Adrian,

## 2020-03-04 DIAGNOSIS — E61.1 IRON DEFICIENCY: ICD-10-CM

## 2020-03-04 DIAGNOSIS — D75.1 ERYTHROCYTOSIS: ICD-10-CM

## 2020-03-04 LAB
ERYTHROCYTE [DISTWIDTH] IN BLOOD BY AUTOMATED COUNT: 16.7 % (ref 10–15)
FERRITIN SERPL-MCNC: 24 NG/ML (ref 26–388)
HCT VFR BLD AUTO: 51.3 % (ref 40–53)
HGB BLD-MCNC: 16.5 G/DL (ref 13.3–17.7)
MCH RBC QN AUTO: 27.7 PG (ref 26.5–33)
MCHC RBC AUTO-ENTMCNC: 32.2 G/DL (ref 31.5–36.5)
MCV RBC AUTO: 86 FL (ref 78–100)
PLATELET # BLD AUTO: 163 10E9/L (ref 150–450)
RBC # BLD AUTO: 5.95 10E12/L (ref 4.4–5.9)
WBC # BLD AUTO: 6.4 10E9/L (ref 4–11)

## 2020-03-04 PROCEDURE — 81403 MOPATH PROCEDURE LEVEL 4: CPT | Performed by: INTERNAL MEDICINE

## 2020-03-04 PROCEDURE — 82668 ASSAY OF ERYTHROPOIETIN: CPT | Mod: 90 | Performed by: INTERNAL MEDICINE

## 2020-03-04 PROCEDURE — 81219 CALR GENE COM VARIANTS: CPT | Performed by: INTERNAL MEDICINE

## 2020-03-04 PROCEDURE — 85027 COMPLETE CBC AUTOMATED: CPT | Performed by: INTERNAL MEDICINE

## 2020-03-04 PROCEDURE — 36415 COLL VENOUS BLD VENIPUNCTURE: CPT | Performed by: INTERNAL MEDICINE

## 2020-03-04 PROCEDURE — 81270 JAK2 GENE: CPT | Performed by: INTERNAL MEDICINE

## 2020-03-04 PROCEDURE — 82728 ASSAY OF FERRITIN: CPT | Performed by: INTERNAL MEDICINE

## 2020-03-04 PROCEDURE — 99000 SPECIMEN HANDLING OFFICE-LAB: CPT | Performed by: INTERNAL MEDICINE

## 2020-03-05 LAB — EPO SERPL-ACNC: 13 MU/ML (ref 4–27)

## 2020-03-12 ENCOUNTER — ONCOLOGY VISIT (OUTPATIENT)
Dept: ONCOLOGY | Facility: CLINIC | Age: 75
End: 2020-03-12
Payer: MEDICARE

## 2020-03-12 VITALS
TEMPERATURE: 98.5 F | WEIGHT: 240.6 LBS | DIASTOLIC BLOOD PRESSURE: 79 MMHG | RESPIRATION RATE: 16 BRPM | SYSTOLIC BLOOD PRESSURE: 128 MMHG | HEIGHT: 71 IN | HEART RATE: 60 BPM | OXYGEN SATURATION: 98 % | BODY MASS INDEX: 33.68 KG/M2

## 2020-03-12 DIAGNOSIS — D75.1 POLYCYTHEMIA SECONDARY TO HYPOXIA: Primary | ICD-10-CM

## 2020-03-12 DIAGNOSIS — D75.1 ERYTHROCYTOSIS: ICD-10-CM

## 2020-03-12 PROCEDURE — 99214 OFFICE O/P EST MOD 30 MIN: CPT | Performed by: INTERNAL MEDICINE

## 2020-03-12 ASSESSMENT — PAIN SCALES - GENERAL: PAINLEVEL: NO PAIN (1)

## 2020-03-12 ASSESSMENT — MIFFLIN-ST. JEOR: SCORE: 1859.51

## 2020-03-12 NOTE — LETTER
3/12/2020         RE: Martell Coelho  [address] [facility] [address]        Dear Colleague,    Thank you for referring your patient, Martell Coelho, to the [facility]. Please see a copy of my visit note below.    Hematology Follow-up visit:  Date on this visit: Mar 12, 2020    Primary Care Physician: Marli Daly.    Diagnosis:   1.  Erythrocytosis   He transferred his care to me in June 2015, due to Dr. Barber leaving our practice. He originally met with Dr. Barber in 07/2013 when he was evaluated for erythrocytosis. He reports that in 2013 in [facility] he was noted to have Hb ede 18-20 range and underwent three phlebotomies every other day, 500 cc removed each time. These were associated with lightheadedness. When he was seen by Dr. Barber his Hb was normal at 16.2 g/dl, HCT of 46.2. WBC was 5.5 with normal absolute differential counts and platelet count was 182. LFTS were normal, ferritin was 39. TSH was normal, LDH was normal. PSA was 0.42 and DAVID 2 mutation analysis was negative. At that time he was also diagnosed with MURRAY and his erythrocytosis was felt to be secondary to MURRAY and no phlebotomy was recommended. However, he went back to [facility] and in 2015 he was still having monthly phlebotomies to keep his HCT down although it sounds like HCT was in the mid 40s there. When we saw him in June 2015, his serum erythropoietin level was normal at 27. Hb was 14.1 and ferritin was low at 11. CT abdomen 6/18/2015 showed colonic diverticulosis without inflammatory change and elevation of L hemidiaphragm. Adrenals, spleen and pancreas and liver appeared normal.He has a Hx of CAD, s/p PTCIx2. He is active and exercises daily. In June 2015,  have recommended that he discontinue phlebotomy sessions in [facility] and have his CBCd checked annually. He reports having an EGD in [facility] in May 2016 when after the biopsy he had an UGI bleed. He required repeat EGD and cauterization of bleeding area, per  patient.  He was feeling fatigued and his ferritin was low in 2019.  We have tried to give him IV ferric carboxymaltose but his insurance would not approve the infusion and he would incur out-of-pocket costs.  He declined proceeding with IV Injectafer if he had to pay out of pocket.  He did decide to proceed with 2 doses of Venofer in April 2019, 300 mg each.  Afterwards, he went to [facility] and his ferritin was up to 100 per patient in May 2019 and down to 15 Melissa 2019.  Apparently he then had hemoglobin checked in July 2019 in [facility], and he was prescribed 3 courses of phlebotomy with 400 cc were removed each time weekly.    History Of Present Illness:  Mr. Coelho is a 74 year old male who presents for f/up  erythrocytosis previously believed to be secondary to sleep apnea and iron deficiency. His daughter lives in the .  He is already on ASA 81 mg PO daily.  His ferritin has remained low and unchanged over at least the last 3 years, most recently as below:  Results for ADRIAN COELHO (MRN [id number]) as of 3/12/2020 15:45   Ref. Range 10/8/2018 11:08 4/16/2019 15:00 12/5/2019 11:08 3/4/2020 09:05   Ferritin Latest Ref Range: 26 - 388 ng/mL 10 (L) 11 (L) 13 (L) 24 (L)     He has been on ferrous sulfate supplementation 3 times a week.  He has continued to use CPAP regularly.  However, his hemoglobin and hematocrit are in the high normal range, as below:  Results for ADRIAN COELHO (MRN [id number]) as of 3/12/2020 15:45   Ref. Range 10/8/2018 11:08 4/16/2019 15:00 12/5/2019 11:08 3/4/2020 09:05   Hemoglobin Latest Ref Range: 13.3 - 17.7 g/dL 15.3 15.0 14.6 16.5   Results for ADRIAN COELHO (MRN [id number]) as of 3/12/2020 15:45   Ref. Range 8/8/2017 13:38 10/8/2018 11:08 3/4/2020 09:05   Hematocrit Latest Ref Range: 40.0 - 53.0 % 47.1 48.3 51.3         His weight has been stable at around 239-240  Pounds in the last year.  He exercises regularly, daily for 70 minutes at home.  However, he has been feeling generally  unwell and has noticed some generalized weakness more so compared to previous.  He denies any headache.  He reports that back in [facility] he will get phlebotomy if he had some blood in his saliva or  bloodshot eyes.  He has a history of elevated left hemidiaphragm and recently has been having intermittent left upper quadrant pain and proceeded with CT abdomen and pelvis with IV contrast in [facility] on February 27, 2020.  It demonstrated chronic prominent elevation of left hemidiaphragm with some left lower lobe atelectasis.  There was no acute chest disease and left ribs appeared normal on chest x-ray on February 27, 2020.  In addition, a complete 12 point  review of systems is negative.    Past Medical/Surgical History:  CAD, s/p PTCI  MURRAY  Secondary erythrocytosis  BPH   He has a Hx of chronic pancreatitis in the past. He has had a cholecystectomy in the past.  He has a Hx of diverticulits, recurrent x3 in the past.  He has a Hx of BPH, s/p TURP in [facility] and here has been following up with Dr. Infante.  He had a colonoscopy on 8/3/2017 - which showed Diverticulosis of colon and repeat was recommended in 10 years.     FHx and Soc Hx reviewed   He is a former smoker.    Past Surgical History:   Procedure Laterality Date     APPENDECTOMY       CYSTOSCOPY, TRANSURETHRAL RESECTION (TUR) PROSTATE, COMBINED     Cholecystectomy    Allergies:  Allergies as of 03/12/2020     (No Known Allergies)     Current Medications:  Current Outpatient Medications   Medication Sig Dispense Refill     aspirin 81 MG tablet Take  by mouth daily.       MAGNESIUM OXIDE PO        nebivolol (BYSTOLIC) 2.5 MG tablet Take 2.5 mg by mouth daily       nitroglycerin (NITROLINGUAL) 0.4 MG/SPRAY spray Place 1 spray under the tongue every 5 minutes as needed.       Omega-3 Fatty Acids (OMEGA-3 FISH OIL PO) Take 1 g by mouth daily       omeprazole (PRILOSEC) 20 MG capsule Take 1 capsule by mouth daily.       pantoprazole (PROTONIX) 40 MG EC  "tablet Take 40 mg by mouth daily       rosuvastatin (CRESTOR) 20 MG tablet Take 40 mg by mouth daily        UNABLE TO FIND Take 2.5 mg by mouth daily. MEDICATION NAME: Nebivolol HCl.           Physical Exam:   /79 (BP Location: Right arm)   Pulse 60   Temp 98.5  F (36.9  C) (Oral)   Resp 16   Ht 1.797 m (5' 10.75\")   Wt 109.1 kg (240 lb 9.6 oz)   SpO2 98%   BMI 33.79 kg/m      Wt Readings from Last 5 Encounters:   03/12/20 109.1 kg (240 lb 9.6 oz)   01/07/20 110.2 kg (243 lb)   12/05/19 109.8 kg (242 lb)   04/24/19 106.9 kg (235 lb 9.6 oz)   04/22/19 108.8 kg (239 lb 14.4 oz)           GENERAL APPEARANCE: healthy, alert and no distress     NECK:  no asymmetry or masses     RESP: lungs clear to auscultation - no rales, rhonchi or wheezes     CARDIOVASCULAR: regular rates and rhythm, normal S1 S2, no S3 or S4 and no murmur.     ABDOMEN:  soft, nontender, no HSM or masses and bowel sounds normal     MUSCULOSKELETAL: extremities normal- no gross deformities noted, no evidence of inflammation in joints, FROM in all extremities. No edema b/l LE.     SKIN: no suspicious lesions or rashes     PSYCHIATRIC: mentation appears normal and affect normal    Laboratory/Imaging Studies    Orders Only on 03/04/2020   Component Date Value Ref Range Status     Erythropoietin 03/04/2020 13  4 - 27 mU/mL Final    Comment: (Note)  INTERPRETIVE INFORMATION: Erythropoietin  Normal serum concentrations of erythropoietin for 95% of   individuals with normal hematocrits range from 4-27 mU/mL.  As the hematocrit is lowered by iron deficiency, aplastic,   or hemolytic anemia, the concentration of erythropoietin   increases as shown in the graph below. In the absence of   anemia, elevated concentrations are seen in renal tumors,   as a manifestation of renal transplant rejection, and in   secondary polycythemia. Low values may be observed in   hemochromatosis.       Expected Erythropoietin Concentrations in Patients                   "  with Uncomplicated Anemia    Erythropoietin (mU/mL)               100,000 - +                         +                10,000 - +.......                         + .......                 1,000 - +    .......                         +     ........                   100 - +       ........                         +        ........                    10 - +          ........                                                    +---+---+---+---+---+---+                        10   20  30  40  50  60  70                               (Hematocrit %)           (Contributions To Nephrology 1988:66:54-62)  Decreased erythropoietin concentrations with an elevated   hematocrit are observed in patients with polycythemia rubra   vera, and with a decreased hematocrit in patients with HIV   infection who are receiving AZT.  Patients on AZT who have   anemia and erythropoietin concentrations of less than or   equal to 500 mU/mL may benefit from therapy with   recombinant EPO ([facility] [id number]).  Performed by [facility],  [address] [place] [id number] [phone]  [email or web address], Pastor Her MD, Lab. Director       Ferritin 03/04/2020 24* 26 - 388 ng/mL Final     WBC 03/04/2020 6.4  4.0 - 11.0 10e9/L Final     RBC Count 03/04/2020 5.95* 4.4 - 5.9 10e12/L Final     Hemoglobin 03/04/2020 16.5  13.3 - 17.7 g/dL Final     Hematocrit 03/04/2020 51.3  40.0 - 53.0 % Final     MCV 03/04/2020 86  78 - 100 fl Final     MCH 03/04/2020 27.7  26.5 - 33.0 pg Final     MCHC 03/04/2020 32.2  31.5 - 36.5 g/dL Final     RDW 03/04/2020 16.7* 10.0 - 15.0 % Final     Platelet Count 03/04/2020 163  150 - 450 10e9/L Final       ASSESSMENT/PLAN:    Mr. 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 He has remained on  CPAP, with correction of his hemoglobin.. Hb is normal but ferritin is low but he reports an UGI bleed in 2016 in [place], followed by 5 doses of IV iron (Monofer, 100 mg each  dose, per patient).   He did respond to IV a total of 600 mg of Venofer which he received in April 2019, but subsequently had phlebotomy in [facility] for reportedly symptomatic erythrocytosis in July 2019 which negated the effect of IV Venofer.     1.  Erythrocytosis-in the past his diagnosis was secondary erythrocytosis associated with sleep apnea and Mariano 2 mutation analysis was negative.  However, his erythrocytosis is now returning despite regular CPAP use.  Repeat serum erythropoietin level is normal.  He recently had CT abdomen pelvis in an outside health system which showed no evidence of hepatosplenomegaly or any kidney tumors that could be secreting erythropoietin.  We will go ahead and evaluate for MPL and CALR mutation analysis and reevaluate for primary erythroidcytosis.  Currently he is declining potential bone marrow biopsy evaluation.  We will recheck his hemoglobin and hematocrit in 3 to 4 weeks or sooner as clinically necessary.      2. Iron deficiency -his ferritin is low normal but would not increase supplemental iron supplementation secondary to erythrocytosis.  Continue oral ferrous sulfate supplementation 2-3 times a week in the interim, while we are awaiting the results of his work-up.    At the end of our visit patient verbalized understanding and concurred with the plan.                Again, thank you for allowing me to participate in the care of your patient.        Sincerely,        Rula Mo MD, MD

## 2020-03-12 NOTE — PROGRESS NOTES
Hematology Follow-up visit:  Date on this visit: Mar 12, 2020    Primary Care Physician: Marli Daly.  Sleep medicine: Dr. WILFREDO Woody  Diagnosis:   1.  Erythrocytosis   He transferred his care to me in June 2015, due to Dr. Barber leaving our practice. He originally met with Dr. Barber in 07/2013 when he was evaluated for erythrocytosis. He reports that in 2013 in [facility] he was noted to have Hb ede 18-20 range and underwent three phlebotomies every other day, 500 cc removed each time. These were associated with lightheadedness. When he was seen by Dr. Barber his Hb was normal at 16.2 g/dl, HCT of 46.2. WBC was 5.5 with normal absolute differential counts and platelet count was 182. LFTS were normal, ferritin was 39. TSH was normal, LDH was normal. PSA was 0.42 and DAVID 2 mutation analysis was negative. At that time he was also diagnosed with MURRAY and his erythrocytosis was felt to be secondary to MURRAY and no phlebotomy was recommended. However, he went back to [facility] and in 2015 he was still having monthly phlebotomies to keep his HCT down although it sounds like HCT was in the mid 40s there. When we saw him in June 2015, his serum erythropoietin level was normal at 27. Hb was 14.1 and ferritin was low at 11. CT abdomen 6/18/2015 showed colonic diverticulosis without inflammatory change and elevation of L hemidiaphragm. Adrenals, spleen and pancreas and liver appeared normal.He has a Hx of CAD, s/p PTCIx2. He is active and exercises daily. In June 2015,  have recommended that he discontinue phlebotomy sessions in [facility] and have his CBCd checked annually. He reports having an EGD in [facility] in May 2016 when after the biopsy he had an UGI bleed. He required repeat EGD and cauterization of bleeding area, per patient.  He was feeling fatigued and his ferritin was low in 2019.  We have tried to give him IV ferric carboxymaltose but his insurance would not approve the infusion and he would incur out-of-pocket costs.  He  declined proceeding with IV Injectafer if he had to pay out of pocket.  He did decide to proceed with 2 doses of Venofer in April 2019, 300 mg each.  Afterwards, he went to [facility] and his ferritin was up to 100 per patient in May 2019 and down to 15 Melissa 2019.  Apparently he then had hemoglobin checked in July 2019 in [facility], and he was prescribed 3 courses of phlebotomy with 400 cc were removed each time weekly.    History Of Present Illness:  Mr. Coelho is a 74 year old male who presents for f/up  erythrocytosis previously believed to be secondary to sleep apnea and iron deficiency. His daughter lives in the .  He is already on ASA 81 mg PO daily.  His ferritin has remained low and unchanged over at least the last 3 years, most recently as below:  Results for ADRIAN COELHO (MRN [id number]) as of 3/12/2020 15:45   Ref. Range 10/8/2018 11:08 4/16/2019 15:00 12/5/2019 11:08 3/4/2020 09:05   Ferritin Latest Ref Range: 26 - 388 ng/mL 10 (L) 11 (L) 13 (L) 24 (L)     He has been on ferrous sulfate supplementation 3 times a week.  He has continued to use CPAP regularly.  However, his hemoglobin and hematocrit are in the high normal range, as below:  Results for ADRIAN COELHO (MRN [id number]) as of 3/12/2020 15:45   Ref. Range 10/8/2018 11:08 4/16/2019 15:00 12/5/2019 11:08 3/4/2020 09:05   Hemoglobin Latest Ref Range: 13.3 - 17.7 g/dL 15.3 15.0 14.6 16.5   Results for ADRIAN COELHO (MRN [id number]) as of 3/12/2020 15:45   Ref. Range 8/8/2017 13:38 10/8/2018 11:08 3/4/2020 09:05   Hematocrit Latest Ref Range: 40.0 - 53.0 % 47.1 48.3 51.3         His weight has been stable at around 239-240  Pounds in the last year.  He exercises regularly, daily for 70 minutes at home.  However, he has been feeling generally unwell and has noticed some generalized weakness more so compared to previous.  He denies any headache.  He reports that back in [facility] he will get phlebotomy if he had some blood in his saliva or  bloodshot  eyes.  He has a history of elevated left hemidiaphragm and recently has been having intermittent left upper quadrant pain and proceeded with CT abdomen and pelvis with IV contrast in [facility] on February 27, 2020.  It demonstrated chronic prominent elevation of left hemidiaphragm with some left lower lobe atelectasis.  There was no acute chest disease and left ribs appeared normal on chest x-ray on February 27, 2020.  In addition, a complete 12 point  review of systems is negative.    Past Medical/Surgical History:  CAD, s/p PTCI  MURRAY  Secondary erythrocytosis  BPH   He has a Hx of chronic pancreatitis in the past. He has had a cholecystectomy in the past.  He has a Hx of diverticulits, recurrent x3 in the past.  He has a Hx of BPH, s/p TURP in [facility] and here has been following up with Dr. Infante.  He had a colonoscopy on 8/3/2017 - which showed Diverticulosis of colon and repeat was recommended in 10 years.     FHx and Soc Hx reviewed   He is a former smoker.    Past Surgical History:   Procedure Laterality Date     APPENDECTOMY       CYSTOSCOPY, TRANSURETHRAL RESECTION (TUR) PROSTATE, COMBINED     Cholecystectomy    Allergies:  Allergies as of 03/12/2020     (No Known Allergies)     Current Medications:  Current Outpatient Medications   Medication Sig Dispense Refill     aspirin 81 MG tablet Take  by mouth daily.       MAGNESIUM OXIDE PO        nebivolol (BYSTOLIC) 2.5 MG tablet Take 2.5 mg by mouth daily       nitroglycerin (NITROLINGUAL) 0.4 MG/SPRAY spray Place 1 spray under the tongue every 5 minutes as needed.       Omega-3 Fatty Acids (OMEGA-3 FISH OIL PO) Take 1 g by mouth daily       omeprazole (PRILOSEC) 20 MG capsule Take 1 capsule by mouth daily.       pantoprazole (PROTONIX) 40 MG EC tablet Take 40 mg by mouth daily       rosuvastatin (CRESTOR) 20 MG tablet Take 40 mg by mouth daily        UNABLE TO FIND Take 2.5 mg by mouth daily. MEDICATION NAME: Nebivolol HCl.           Physical  "Exam:   /79 (BP Location: Right arm)   Pulse 60   Temp 98.5  F (36.9  C) (Oral)   Resp 16   Ht 1.797 m (5' 10.75\")   Wt 109.1 kg (240 lb 9.6 oz)   SpO2 98%   BMI 33.79 kg/m      Wt Readings from Last 5 Encounters:   03/12/20 109.1 kg (240 lb 9.6 oz)   01/07/20 110.2 kg (243 lb)   12/05/19 109.8 kg (242 lb)   04/24/19 106.9 kg (235 lb 9.6 oz)   04/22/19 108.8 kg (239 lb 14.4 oz)           GENERAL APPEARANCE: healthy, alert and no distress     NECK:  no asymmetry or masses     RESP: lungs clear to auscultation - no rales, rhonchi or wheezes     CARDIOVASCULAR: regular rates and rhythm, normal S1 S2, no S3 or S4 and no murmur.     ABDOMEN:  soft, nontender, no HSM or masses and bowel sounds normal     MUSCULOSKELETAL: extremities normal- no gross deformities noted, no evidence of inflammation in joints, FROM in all extremities. No edema b/l LE.     SKIN: no suspicious lesions or rashes     PSYCHIATRIC: mentation appears normal and affect normal    Laboratory/Imaging Studies    Orders Only on 03/04/2020   Component Date Value Ref Range Status     Erythropoietin 03/04/2020 13  4 - 27 mU/mL Final    Comment: (Note)  INTERPRETIVE INFORMATION: Erythropoietin  Normal serum concentrations of erythropoietin for 95% of   individuals with normal hematocrits range from 4-27 mU/mL.  As the hematocrit is lowered by iron deficiency, aplastic,   or hemolytic anemia, the concentration of erythropoietin   increases as shown in the graph below. In the absence of   anemia, elevated concentrations are seen in renal tumors,   as a manifestation of renal transplant rejection, and in   secondary polycythemia. Low values may be observed in   hemochromatosis.       Expected Erythropoietin Concentrations in Patients                    with Uncomplicated Anemia    Erythropoietin (mU/mL)               100,000 - +                         +                10,000 - +.......                         + .......                 1,000 - +    " .......                         +     ........                   100 - +       ........                         +        ........                    10 - +          ........                                                    +---+---+---+---+---+---+                        10   20  30  40  50  60  70                               (Hematocrit %)           (Contributions To Nephrology 1988:66:54-62)  Decreased erythropoietin concentrations with an elevated   hematocrit are observed in patients with polycythemia rubra   vera, and with a decreased hematocrit in patients with HIV   infection who are receiving AZT.  Patients on AZT who have   anemia and erythropoietin concentrations of less than or   equal to 500 mU/mL may benefit from therapy with   recombinant EPO (:[id number]).  Performed by [facility],  [address] [place] [id number] [phone]  [email or web address], Pastor Her MD, Lab. Director       Ferritin 03/04/2020 24* 26 - 388 ng/mL Final     WBC 03/04/2020 6.4  4.0 - 11.0 10e9/L Final     RBC Count 03/04/2020 5.95* 4.4 - 5.9 10e12/L Final     Hemoglobin 03/04/2020 16.5  13.3 - 17.7 g/dL Final     Hematocrit 03/04/2020 51.3  40.0 - 53.0 % Final     MCV 03/04/2020 86  78 - 100 fl Final     MCH 03/04/2020 27.7  26.5 - 33.0 pg Final     MCHC 03/04/2020 32.2  31.5 - 36.5 g/dL Final     RDW 03/04/2020 16.7* 10.0 - 15.0 % Final     Platelet Count 03/04/2020 163  150 - 450 10e9/L Final       ASSESSMENT/PLAN:    Mr. 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 He has remained on  CPAP, with correction of his hemoglobin.. Hb is normal but ferritin is low but he reports an UGI bleed in 2016 in [facility], followed by 5 doses of IV iron (Monofer, 100 mg each dose, per patient).   He did respond to IV a total of 600 mg of Venofer which he received in April 2019, but subsequently had phlebotomy in [facility] for reportedly symptomatic erythrocytosis in July  2019 which negated the effect of IV Venofer.     1.  Erythrocytosis-in the past his diagnosis was secondary erythrocytosis associated with sleep apnea and Mariano 2 mutation analysis was negative.  However, his erythrocytosis is now returning despite regular CPAP use.  Repeat serum erythropoietin level is normal.  He recently had CT abdomen pelvis in an outside health system which showed no evidence of hepatosplenomegaly or any kidney tumors that could be secreting erythropoietin.  We will go ahead and evaluate for MPL and CALR mutation analysis and reevaluate for primary erythroidcytosis.  Currently he is declining potential bone marrow biopsy evaluation.  We will recheck his hemoglobin and hematocrit in 3 to 4 weeks or sooner as clinically necessary.      2. Iron deficiency -his ferritin is low normal but would not increase supplemental iron supplementation secondary to erythrocytosis.  Continue oral ferrous sulfate supplementation 2-3 times a week in the interim, while we are awaiting the results of his work-up.    At the end of our visit patient verbalized understanding and concurred with the plan.      Addendum:  Pt reporting fatigue which has been previously relieved with phlebotomy in [facility] and he was also getting NS bolus with his phlebotomies.  I do not think it is unreasonable to proceed with one-time phlebotomy and remove 500 cc x 1 with 500 cc normal saline bolus, while we are awaiting for the results of his work-up.  Even if we believe he has secondary erythrocytosis, if his hematocrit is over 50 like it was in March 4, it is reasonable to proceed with phlebotomy to see if there is any symptom improvement.  We will recheck hemoglobin and hematocrit in 2 to 3 weeks.    Addendum:  No mutations were identified in the analyzed gene regions of JAK2, CALR,   or MPL genes.     Addendum:  Pt is s/p phlebotomy on 3/16/2020. Can recheck Hb and hematocrit in 3-4 weeks.    Addendum: Had nocturnal oximetry and outside  study from [facility] reviewed. Qualified for medicare group 1: O2 sat at or below 88% for 15 min (over 5 min during sleep qualifies). He is seeing Dr. WILFREDO Woody in a virtual visit on 6/29 and we will follow-up with him afterwards in a virtual visit.     S/p phlebotomy on 6/8/2020. HCT 51.2 on 6/22/2020 and then 48.1 on 6/30/2020.  He was sen by Dr. Woody and felt to have  rather mild sleep apnea and hypoxemia and significant erythrocytosis due to this level of hypoxemia seems atypical.  Plan is to continue auto PAP therapy and start supplemental O2 at 2L/min with CPAP     Plan: return in one month with Hb, HCT.

## 2020-03-12 NOTE — NURSING NOTE
"Oncology Rooming Note    March 12, 2020 2:03 PM   Martell Coelho is a 74 year old male who presents for:    Chief Complaint   Patient presents with     Oncology Clinic Visit     Follow up     Initial Vitals: /79 (BP Location: Right arm)   Pulse 60   Temp 98.5  F (36.9  C) (Oral)   Resp 16   Ht 1.797 m (5' 10.75\")   Wt 109.1 kg (240 lb 9.6 oz)   SpO2 98%   BMI 33.79 kg/m   Estimated body mass index is 33.79 kg/m  as calculated from the following:    Height as of this encounter: 1.797 m (5' 10.75\").    Weight as of this encounter: 109.1 kg (240 lb 9.6 oz). Body surface area is 2.33 meters squared.  No Pain (1) Comment: Data Unavailable   No LMP for male patient.  Allergies reviewed: Yes  Medications reviewed: Yes    Medications: Medication refills not needed today.  Pharmacy name entered into EPIC: Data Unavailable    Doreen Fernandes LPN              "

## 2020-03-13 PROBLEM — D75.1 ERYTHROCYTOSIS: Status: ACTIVE | Noted: 2020-03-13

## 2020-03-13 LAB — COPATH REPORT: NORMAL

## 2020-03-16 ENCOUNTER — INFUSION THERAPY VISIT (OUTPATIENT)
Dept: INFUSION THERAPY | Facility: CLINIC | Age: 75
End: 2020-03-16
Payer: MEDICARE

## 2020-03-16 VITALS
SYSTOLIC BLOOD PRESSURE: 148 MMHG | OXYGEN SATURATION: 98 % | HEART RATE: 66 BPM | TEMPERATURE: 98 F | DIASTOLIC BLOOD PRESSURE: 87 MMHG

## 2020-03-16 DIAGNOSIS — R53.83 OTHER FATIGUE: ICD-10-CM

## 2020-03-16 DIAGNOSIS — D75.1 ERYTHROCYTOSIS: Primary | ICD-10-CM

## 2020-03-16 PROCEDURE — 96360 HYDRATION IV INFUSION INIT: CPT | Performed by: NURSE PRACTITIONER

## 2020-03-16 PROCEDURE — 99195 PHLEBOTOMY: CPT | Performed by: NURSE PRACTITIONER

## 2020-03-16 PROCEDURE — 99207 ZZC NO CHARGE NURSE ONLY: CPT

## 2020-03-16 RX ADMIN — Medication 500 ML: at 12:33

## 2020-03-16 ASSESSMENT — PAIN SCALES - GENERAL: PAINLEVEL: NO PAIN (0)

## 2020-03-16 NOTE — PROGRESS NOTES
Infusion Nursing Note:  Martell Coelho presents today for IVF/Phlebotomy.    Patient seen by provider today: No   present during visit today: Not Applicable.    Note: Pt c/o some dizziness due to his high hematacrit, says this has happened in the past but states he will feel clear/not dizzy 2 days after the phlebotomy .    Intravenous Access:  Peripheral IV placed.    Treatment Conditions:  500cc withdrawn via gravity and replaced with 500cc NS.  Pt denies any dizziness after phlebotomy, VSS upon discharge.      Post Infusion Assessment:  Patient tolerated infusion without incident.  Blood return noted pre and post infusion.  Site patent and intact, free from redness, edema or discomfort.  No evidence of extravasations.  Access discontinued per protocol.       Discharge Plan:   Patient discharged in stable condition accompanied by: self.  Departure Mode: Ambulatory.  Pt states he will message Dr Mo with returning symptoms and Dr HARDY will order phlebotomy/IVF as needed.    Francisco Barfield RN

## 2020-05-07 DIAGNOSIS — D75.1 ERYTHROCYTOSIS: ICD-10-CM

## 2020-05-07 LAB
HCT VFR BLD AUTO: 50.6 % (ref 40–53)
HGB BLD-MCNC: 16.9 G/DL (ref 13.3–17.7)

## 2020-05-07 PROCEDURE — 85014 HEMATOCRIT: CPT | Performed by: INTERNAL MEDICINE

## 2020-05-07 PROCEDURE — 36415 COLL VENOUS BLD VENIPUNCTURE: CPT | Performed by: INTERNAL MEDICINE

## 2020-05-07 PROCEDURE — 85018 HEMOGLOBIN: CPT | Performed by: INTERNAL MEDICINE

## 2020-05-21 DIAGNOSIS — G47.33 OSA (OBSTRUCTIVE SLEEP APNEA): Primary | ICD-10-CM

## 2020-06-08 ENCOUNTER — INFUSION THERAPY VISIT (OUTPATIENT)
Dept: INFUSION THERAPY | Facility: CLINIC | Age: 75
End: 2020-06-08
Payer: MEDICARE

## 2020-06-08 VITALS
TEMPERATURE: 98.4 F | HEART RATE: 72 BPM | DIASTOLIC BLOOD PRESSURE: 75 MMHG | SYSTOLIC BLOOD PRESSURE: 125 MMHG | OXYGEN SATURATION: 95 % | RESPIRATION RATE: 16 BRPM

## 2020-06-08 DIAGNOSIS — R53.83 OTHER FATIGUE: ICD-10-CM

## 2020-06-08 DIAGNOSIS — D75.1 ERYTHROCYTOSIS: Primary | ICD-10-CM

## 2020-06-08 PROCEDURE — 96360 HYDRATION IV INFUSION INIT: CPT | Performed by: NURSE PRACTITIONER

## 2020-06-08 PROCEDURE — 99195 PHLEBOTOMY: CPT | Performed by: NURSE PRACTITIONER

## 2020-06-08 PROCEDURE — 99207 ZZC NO CHARGE NURSE ONLY: CPT

## 2020-06-08 RX ADMIN — Medication 500 ML: at 13:54

## 2020-06-08 ASSESSMENT — PAIN SCALES - GENERAL: PAINLEVEL: NO PAIN (0)

## 2020-06-08 NOTE — PROGRESS NOTES
"Prior to scheduled phlebotomy verified patient identity using patient's name and date of birth.  Lab(s) verified: N/A    Peripheral Access:  Accessed: left antecubital space with 20ga autoguard instyte catheter without difficulty.  Positive blood return.  No redness, edema or complaints of discomfort.  Pt tolerated phlebotomy, 500 mls of blood removed via gravity using whole blood collection bag and scale per MD's order.  500 mls of IV fluid replaced.  Pt tolerated procedure without adverse reactions.  Pt denies headache, nausea or discomfort.  IV flushed with 10mls 0.9% Normal Saline after completion of procedure and discontinued per policy with pressure dressing applied.      Pre Vital Signs including O2 saturation documented in \"Vitals\"  Post Vital Signs /76    Reviewed and provided discharge instructions regarding therapeutic phlebotomy.  Pt verbalized understanding.    Reviewed appointments and copy of schedule given to pt.  Return to clinic in: 3-4 months as needed.       Ambulation steady.  Pt alert and orientated upon discharge.            "

## 2020-06-12 ENCOUNTER — TRANSFERRED RECORDS (OUTPATIENT)
Dept: HEALTH INFORMATION MANAGEMENT | Facility: CLINIC | Age: 75
End: 2020-06-12

## 2020-06-18 ENCOUNTER — APPOINTMENT (OUTPATIENT)
Age: 75
Setting detail: DERMATOLOGY
End: 2020-06-18

## 2020-06-18 DIAGNOSIS — L81.4 OTHER MELANIN HYPERPIGMENTATION: ICD-10-CM

## 2020-06-18 DIAGNOSIS — D18.0 HEMANGIOMA: ICD-10-CM

## 2020-06-18 DIAGNOSIS — Z85.828 PERSONAL HISTORY OF OTHER MALIGNANT NEOPLASM OF SKIN: ICD-10-CM

## 2020-06-18 DIAGNOSIS — L57.0 ACTINIC KERATOSIS: ICD-10-CM

## 2020-06-18 DIAGNOSIS — L82.1 OTHER SEBORRHEIC KERATOSIS: ICD-10-CM

## 2020-06-18 DIAGNOSIS — L57.8 OTHER SKIN CHANGES DUE TO CHRONIC EXPOSURE TO NONIONIZING RADIATION: ICD-10-CM

## 2020-06-18 DIAGNOSIS — D22 MELANOCYTIC NEVI: ICD-10-CM

## 2020-06-18 PROBLEM — D18.01 HEMANGIOMA OF SKIN AND SUBCUTANEOUS TISSUE: Status: ACTIVE | Noted: 2020-06-18

## 2020-06-18 PROBLEM — D22.5 MELANOCYTIC NEVI OF TRUNK: Status: ACTIVE | Noted: 2020-06-18

## 2020-06-18 PROCEDURE — 99214 OFFICE O/P EST MOD 30 MIN: CPT | Mod: 25

## 2020-06-18 PROCEDURE — 17000 DESTRUCT PREMALG LESION: CPT

## 2020-06-18 PROCEDURE — 17003 DESTRUCT PREMALG LES 2-14: CPT

## 2020-06-18 PROCEDURE — OTHER COUNSELING: OTHER

## 2020-06-18 PROCEDURE — OTHER LIQUID NITROGEN: OTHER

## 2020-06-18 ASSESSMENT — LOCATION DETAILED DESCRIPTION DERM
LOCATION DETAILED: LEFT SUPERIOR FOREHEAD
LOCATION DETAILED: EPIGASTRIC SKIN
LOCATION DETAILED: LEFT RIB CAGE
LOCATION DETAILED: RIGHT SUPERIOR FOREHEAD
LOCATION DETAILED: LEFT POPLITEAL SKIN
LOCATION DETAILED: RIGHT INFERIOR TEMPLE
LOCATION DETAILED: RIGHT FOREHEAD
LOCATION DETAILED: LEFT POSTERIOR SHOULDER
LOCATION DETAILED: RIGHT MID-UPPER BACK
LOCATION DETAILED: RIGHT SUPERIOR LATERAL FOREHEAD
LOCATION DETAILED: RIGHT SUPERIOR UPPER BACK
LOCATION DETAILED: LEFT LATERAL SUPERIOR CHEST

## 2020-06-18 ASSESSMENT — LOCATION ZONE DERM
LOCATION ZONE: TRUNK
LOCATION ZONE: FACE
LOCATION ZONE: LEG
LOCATION ZONE: ARM

## 2020-06-18 ASSESSMENT — LOCATION SIMPLE DESCRIPTION DERM
LOCATION SIMPLE: LEFT SHOULDER
LOCATION SIMPLE: RIGHT TEMPLE
LOCATION SIMPLE: LEFT POPLITEAL SKIN
LOCATION SIMPLE: RIGHT UPPER BACK
LOCATION SIMPLE: CHEST
LOCATION SIMPLE: RIGHT FOREHEAD
LOCATION SIMPLE: LEFT FOREHEAD
LOCATION SIMPLE: ABDOMEN

## 2020-06-18 NOTE — PROCEDURE: LIQUID NITROGEN
Detail Level: Detailed
Post-Care Instructions: I reviewed with the patient in detail post-care instructions. Patient is to wear sunprotection, and avoid picking at any of the treated lesions. Pt may apply Vaseline to crusted or scabbing areas.
Consent: The patient's consent was obtained including but not limited to risks of crusting, scabbing, blistering, scarring, darker or lighter pigmentary change, recurrence, incomplete removal and infection.
Number Of Freeze-Thaw Cycles: 1 freeze-thaw cycle
Duration Of Freeze Thaw-Cycle (Seconds): 1
Render Post-Care Instructions In Note?: no

## 2020-06-22 DIAGNOSIS — D75.1 ERYTHROCYTOSIS: ICD-10-CM

## 2020-06-22 LAB
HCT VFR BLD AUTO: 51.2 % (ref 40–53)
HGB BLD-MCNC: 17 G/DL (ref 13.3–17.7)

## 2020-06-22 PROCEDURE — 36415 COLL VENOUS BLD VENIPUNCTURE: CPT | Performed by: INTERNAL MEDICINE

## 2020-06-22 PROCEDURE — 85014 HEMATOCRIT: CPT | Performed by: INTERNAL MEDICINE

## 2020-06-22 PROCEDURE — 85018 HEMOGLOBIN: CPT | Performed by: INTERNAL MEDICINE

## 2020-06-26 VITALS — BODY MASS INDEX: 34.36 KG/M2 | HEIGHT: 70 IN | WEIGHT: 240 LBS

## 2020-06-26 ASSESSMENT — MIFFLIN-ST. JEOR: SCORE: 1844.88

## 2020-06-26 NOTE — PROGRESS NOTES
"Martell Coelho is a 74 year old male who is being evaluated via a billable video visit.      The patient has been notified of following:     \"This video visit will be conducted via a call between you and your physician/provider. We have found that certain health care needs can be provided without the need for an in-person physical exam.  This service lets us provide the care you need with a video conversation.  If a prescription is necessary we can send it directly to your pharmacy.  If lab work is needed we can place an order for that and you can then stop by our lab to have the test done at a later time.    Video visits are billed at different rates depending on your insurance coverage.  Please reach out to your insurance provider with any questions.    If during the course of the call the physician/provider feels a video visit is not appropriate, you will not be charged for this service.\"    Patient has given verbal consent for Video visit? Yes  How would you like to obtain your AVS? Evettehart  Patient would like the video invitation sent by: Text to cell phone: [phone]  Will anyone else be joining your video visit? No        Video-Visit Details    Type of service:  Video Visit    Video Start Time: 11:06 AM  Video End Time: 11:46 AM    Originating Location (pt. Location): Home    Distant Location (provider location):  [facility]     Platform used for Video Visit: En Bradshaw MA      Sleep progress note:    Date on this visit: 6/29/2020    Primary Physician: Javier Adrian     Chief complaint: hypoxia    Presenting History:     Martell Coelho is currently on auto PAP therapy for mild sleep apnea.    He has been sent by Hematology for an assessment of sleep realated hypoxemia as a reason for secondary erythrocytosis. Latest Hb (6/22/20) of 17 and hematocrit 51.2%    Background history: Patient was dinitially diagnosed with sleep apnea in 2013 with an apnea hypopnea index " of 22.3 per hour. He had a reevaluation in 2017 after weight loss. PSG on 7/27/2017 at weight of 258 lbs showed an AHI of 7.2 per hour. Baseline O2 saturation was 92.7% and time spent below 88% was 2.2 minutes.     Medical comorbidity includes CAD, HTN and hemidiaphragmatic paralysis.     Overnight oximetry ordered by his PCP Dr. Mendoza. There are two night of recording, duirng which patient said he was using CPAP. He may have removed CPAP towards the end of the night. O2 saturations are above 90% at the beginning and most of the night with drop towards the end. Mean O2 saturation was 92.6%, lowest saturation 81%. Time below 88% was 15 minutes (this seems to be total of 2 nights)     Patient does 4 miles of walking, 3 miles of biking and swimming for 20 minutes every morning.     He quit smoking when he was 29 years old and had a 11 pack year history.     Patient uses CPAP every night. He     [employer]     Auto-PAP 6-11 cmH2O download:   85% days with >4 hours use.  Average use 5 hours 04 minutes per day. 95%ile Leak 32 L/min. CPAP 95% pressure 10.9cm. AHI 0.44    Martell goes to sleep at 10:30 PM during the week. He wakes up at 6:00 AM without an alarm. He falls asleep in 10 minutes.  Martell denies difficulty falling asleep.  He wakes up 1-2 times a night for 10 minutes before falling back to sleep.  Martell wakes up to go to the bathroom.  On weekends, Martell goes to sleep at 10:30 PM.  He wakes up at 6:30 AM without an alarm. He falls asleep in 10 minutes.  Patient gets an average of 7-8 hours of sleep per night.     Patient sleeps on his back and side. He denies no morning headaches and restless legs. Martell denies any bruxism, sleep walking, sleep talking, dream enactment, sleep paralysis, cataplexy and hypnogogic/hypnopompic hallucinations.    Patient's [place] Sleepiness score 12/24 consistent with mild daytime sleepiness.      Martell naps 3-4 times per week for 30-60 minutes, feels refreshed after naps. He takes no  inadvertant naps.  He denies closing eyes, dozing and falling asleep while driving.     Allergies:    No Known Allergies    Medications:    Current Outpatient Medications   Medication Sig Dispense Refill     aspirin 81 MG tablet Take  by mouth daily.       MAGNESIUM OXIDE PO        nebivolol (BYSTOLIC) 2.5 MG tablet Take 2.5 mg by mouth daily       nitroglycerin (NITROLINGUAL) 0.4 MG/SPRAY spray Place 1 spray under the tongue every 5 minutes as needed.       Omega-3 Fatty Acids (OMEGA-3 FISH OIL PO) Take 1 g by mouth daily       omeprazole (PRILOSEC) 20 MG capsule Take 1 capsule by mouth daily.       pantoprazole (PROTONIX) 40 MG EC tablet Take 40 mg by mouth daily       rosuvastatin (CRESTOR) 20 MG tablet Take 40 mg by mouth daily          Problem List:  Patient Active Problem List    Diagnosis Date Noted     Erythrocytosis 03/13/2020     Priority: Medium     Other fatigue 10/17/2018     Priority: Medium     Other intestinal malabsorption 10/17/2018     Priority: Medium     Iron deficiency 10/11/2018     Priority: Medium     MURRAY (obstructive sleep apnea) 06/25/2015     Priority: Medium     CAD (coronary artery disease) 06/25/2015     Priority: Medium     BPH (benign prostatic hyperplasia) 06/25/2015     Priority: Medium     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  Past Surgical History:   Procedure Laterality Date     APPENDECTOMY       CYSTOSCOPY, TRANSURETHRAL RESECTION (TUR) PROSTATE, COMBINED         Social History:  Social History     Socioeconomic History     Marital status:      Spouse name: Not on file     Number of children: Not on file     Years of education: Not on file     Highest education level: Not on file   Occupational History     Not on file   Social Needs     Financial resource strain: Not on file     Food insecurity     Worry: Not on file     Inability: Not on file     Transportation needs      Medical: Not on file     Non-medical: Not on file   Tobacco Use     Smoking status: Former Smoker     Smokeless tobacco: Never Used   Substance and Sexual Activity     Alcohol use: No     Drug use: Not on file     Sexual activity: Not on file   Lifestyle     Physical activity     Days per week: Not on file     Minutes per session: Not on file     Stress: Not on file   Relationships     Social connections     Talks on phone: Not on file     Gets together: Not on file     Attends Hindu service: Not on file     Active member of club or organization: Not on file     Attends meetings of clubs or organizations: Not on file     Relationship status: Not on file     Intimate partner violence     Fear of current or ex partner: Not on file     Emotionally abused: Not on file     Physically abused: Not on file     Forced sexual activity: Not on file   Other Topics Concern     Not on file   Social History Narrative     Not on file       Family History:  Family History   Problem Relation Age of Onset     Cancer Father         Leukemia       Review of Systems:  A complete review of systems reviewed by me is negative with the exeption of what has been mentioned in the history of present illness.  CONSTITUTIONAL: NEGATIVE for weight gain/loss, fever, chills, sweats or night sweats, drug allergies.  EYES: NEGATIVE for changes in vision, blind spots, double vision.  ENT: NEGATIVE for ear pain, sore throat, sinus pain, post-nasal drip, runny nose, bloody nose  CARDIAC: NEGATIVE for fast heartbeats or fluttering in chest, chest pain or pressure, breathlessness when lying flat, swollen legs or swollen feet.  NEUROLOGIC: NEGATIVE headaches, weakness or numbness in the arms or legs.  DERMATOLOGIC: NEGATIVE for rashes, new moles or change in mole(s)  PULMONARY: NEGATIVE SOB at rest, SOB with activity, dry cough, productive cough, coughing up blood, wheezing or whistling when breathing.    GASTROINTESTINAL: NEGATIVE for nausea or  "vomitting, loose or watery stools, fat or grease in stools, constipation, abdominal pain, bowel movements black in color or blood noted.  GENITOURINARY: NEGATIVE for pain during urination, blood in urine, urinating more frequently than usual, irregular menstrual periods.  MUSCULOSKELETAL: NEGATIVE for muscle pain, bone or joint pain, swollen joints.  ENDOCRINE: NEGATIVE for increased thirst or urination, diabetes.  LYMPHATIC: NEGATIVE for swollen lymph nodes, lumps or bumps in the breasts or nipple discharge.    Physical Examination:  Vitals: Ht 1.778 m (5' 10\")   Wt 108.9 kg (240 lb)   BMI 34.44 kg/m    BMI= Body mass index is 34.44 kg/m .         Ava Total Score 6/26/2020   Total score - Ava 12       KERON Total Score: 8 (06/26/20 1500)    GENERAL APPEARANCE: healthy, alert and no distress      Impression/Plan:    1. Obstructive sleep apnea    - Patient has mild sleep apnea which is adequately treated with regular use of CPAP therapy. Data from CPAP device shows regular compliance and normal AHI.      2. 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 However, oximetry is from 2 nights and time below 88% is recorded as 15 minutes which seems to be cumulative for 2 nights. Hematology is attributing erythrocytosis to hypoxemia.    - We are unable to perform titration PSG to verify hypoxemia on CPAP. Considering haematology opinion that erythrocytosis could improve with nocturnal O2 therapy, I will go ahead and start him on O2 at 2L/min and assess response.       Plan:     1. Continue auto PAP therapy   2. Start supplemental O2 at 2L/min with CPAP     Obstructive sleep apnea reviewed.  Complications of untreated sleep apnea were reviewed.    I spenta total of 40 minutes with patient woith more than 50% inc osuling     Dr. Constantin Woody     CC: No ref. provider found        "

## 2020-06-29 ENCOUNTER — VIRTUAL VISIT (OUTPATIENT)
Dept: SLEEP MEDICINE | Facility: CLINIC | Age: 75
End: 2020-06-29
Payer: MEDICARE

## 2020-06-29 DIAGNOSIS — D75.1 SECONDARY ERYTHROCYTOSIS: ICD-10-CM

## 2020-06-29 DIAGNOSIS — G47.34 HYPOXIA, SLEEP RELATED: ICD-10-CM

## 2020-06-29 DIAGNOSIS — G47.33 OSA (OBSTRUCTIVE SLEEP APNEA): Primary | ICD-10-CM

## 2020-06-29 PROCEDURE — 99215 OFFICE O/P EST HI 40 MIN: CPT | Mod: 95 | Performed by: INTERNAL MEDICINE

## 2020-06-29 NOTE — Clinical Note
Carlos Mo,     I will be happy to prescribe nocturnal O2 to see if this helps him. However, he has rather mild sleep apnea and hypoxemia and significant erythrocytosis due to this level of hypoxemia seems atypical. Keep us updated if O2 therapy has helped his erythrocytosis.     Thanks

## 2020-06-30 DIAGNOSIS — D75.1 ERYTHROCYTOSIS: ICD-10-CM

## 2020-06-30 LAB
HCT VFR BLD AUTO: 48.1 % (ref 40–53)
HGB BLD-MCNC: 16.4 G/DL (ref 13.3–17.7)

## 2020-06-30 PROCEDURE — 85014 HEMATOCRIT: CPT | Performed by: INTERNAL MEDICINE

## 2020-06-30 PROCEDURE — 36415 COLL VENOUS BLD VENIPUNCTURE: CPT | Performed by: INTERNAL MEDICINE

## 2020-06-30 PROCEDURE — 85018 HEMOGLOBIN: CPT | Performed by: INTERNAL MEDICINE

## 2020-07-13 DIAGNOSIS — D75.1 ERYTHROCYTOSIS: ICD-10-CM

## 2020-07-13 LAB
HCT VFR BLD AUTO: 48.8 % (ref 40–53)
HGB BLD-MCNC: 16.8 G/DL (ref 13.3–17.7)

## 2020-07-13 PROCEDURE — 85018 HEMOGLOBIN: CPT | Performed by: INTERNAL MEDICINE

## 2020-07-13 PROCEDURE — 36415 COLL VENOUS BLD VENIPUNCTURE: CPT | Performed by: INTERNAL MEDICINE

## 2020-07-13 PROCEDURE — 85014 HEMATOCRIT: CPT | Performed by: INTERNAL MEDICINE

## 2020-07-22 ENCOUNTER — TELEPHONE (OUTPATIENT)
Dept: SLEEP MEDICINE | Facility: CLINIC | Age: 75
End: 2020-07-22

## 2020-07-22 NOTE — TELEPHONE ENCOUNTER
Pt called to schedule PSG/TCM and states he would like to wait until other locations open as he does not want to come to [place] for study.    [employer]

## 2020-07-27 DIAGNOSIS — D75.1 ERYTHROCYTOSIS: ICD-10-CM

## 2020-07-27 LAB
HCT VFR BLD AUTO: 49.1 % (ref 40–53)
HGB BLD-MCNC: 16.7 G/DL (ref 13.3–17.7)

## 2020-07-27 PROCEDURE — 85014 HEMATOCRIT: CPT | Performed by: INTERNAL MEDICINE

## 2020-07-27 PROCEDURE — 36415 COLL VENOUS BLD VENIPUNCTURE: CPT | Performed by: INTERNAL MEDICINE

## 2020-07-27 PROCEDURE — 85018 HEMOGLOBIN: CPT | Performed by: INTERNAL MEDICINE

## 2020-07-28 ENCOUNTER — VIRTUAL VISIT (OUTPATIENT)
Dept: ONCOLOGY | Facility: CLINIC | Age: 75
End: 2020-07-28
Payer: MEDICARE

## 2020-07-28 VITALS — BODY MASS INDEX: 33.58 KG/M2 | WEIGHT: 234 LBS

## 2020-07-28 DIAGNOSIS — D75.1 ERYTHROCYTOSIS: Primary | ICD-10-CM

## 2020-07-28 PROCEDURE — 99214 OFFICE O/P EST MOD 30 MIN: CPT | Mod: 95 | Performed by: INTERNAL MEDICINE

## 2020-07-28 ASSESSMENT — PAIN SCALES - GENERAL: PAINLEVEL: NO PAIN (0)

## 2020-07-28 NOTE — NURSING NOTE
SYMPTOM QUESTIONNAIRE    Pain: no    Nausea/Vomiting: no    Mouth Sores: no    Shortness of Breath: no    Smoking: no    Fever or Chills: no    Hard Stools: no    Soft Stools: no    Weight Loss: no    Weakness: no    Burning, numbness or tingling in hands or feet: no    Problems with skin or swelling: no    Memory Loss: no    Anxiety or Depression: no    Trouble Sleeping: no    Kelli Fitzgerald LPN on 7/28/2020 at 12:11 PM

## 2020-07-28 NOTE — PROGRESS NOTES
"Martell Coelho is a 74 year old male who is being evaluated via a billable video visit.      The patient has been notified of following:     \"This video visit will be conducted via a call between you and your physician/provider. We have found that certain health care needs can be provided without the need for an in-person physical exam.  This service lets us provide the care you need with a video conversation.  If a prescription is necessary we can send it directly to your pharmacy.  If lab work is needed we can place an order for that and you can then stop by our lab to have the test done at a later time.    Video visits are billed at different rates depending on your insurance coverage.  Please reach out to your insurance provider with any questions.    If during the course of the call the physician/provider feels a video visit is not appropriate, you will not be charged for this service.\"    Patient has given verbal consent for Video visit? yes  Video-Visit Details    Type of service:  Video Visit    Video visit duration: 20 min  Originating Location (pt. Location):home  Distant Location (provider location):  [facility]     Platform used for Video Visit: En Mo MD, MD      Hematology Follow-up visit:  Date on this visit: Jul 28, 2020    Primary Care Physician: Marli Daly.  Sleep medicine: Dr. WILFREDO Woody  Diagnosis:   1.  Erythrocytosis   He transferred his care to me in June 2015, due to Dr. Barber leaving our practice. He originally met with Dr. Barber in 07/2013 when he was evaluated for erythrocytosis. He reports that in 2013 in [facility] he was noted to have Hb ede 18-20 range and underwent three phlebotomies every other day, 500 cc removed each time. These were associated with lightheadedness. When he was seen by Dr. Barber his Hb was normal at 16.2 g/dl, HCT of 46.2. WBC was 5.5 with normal absolute differential counts and platelet count was 182. LFTS were normal, " ferritin was 39. TSH was normal, LDH was normal. PSA was 0.42 and DAVID 2 mutation analysis was negative. At that time he was also diagnosed with MURRAY and his erythrocytosis was felt to be secondary to MURRAY and no phlebotomy was recommended. However, he went back to [facility] and in 2015 he was still having monthly phlebotomies to keep his HCT down although it sounds like HCT was in the mid 40s there. When we saw him in June 2015, his serum erythropoietin level was normal at 27. Hb was 14.1 and ferritin was low at 11. CT abdomen 6/18/2015 showed colonic diverticulosis without inflammatory change and elevation of L hemidiaphragm. Adrenals, spleen and pancreas and liver appeared normal.He has a Hx of CAD, s/p PTCIx2. He is active and exercises daily. In June 2015,  have recommended that he discontinue phlebotomy sessions in [facility] and have his CBCd checked annually. He reports having an EGD in [facility] in May 2016 when after the biopsy he had an UGI bleed. He required repeat EGD and cauterization of bleeding area, per patient.  He was feeling fatigued and his ferritin was low in 2019.  We have tried to give him IV ferric carboxymaltose but his insurance would not approve the infusion and he would incur out-of-pocket costs.  He declined proceeding with IV Injectafer if he had to pay out of pocket.  He did decide to proceed with 2 doses of Venofer in April 2019, 300 mg each.  Afterwards, he went to [facility] and his ferritin was up to 100 per patient in May 2019 and down to 15 June 2019.  Apparently he then had hemoglobin checked in July 2019 in [facility], and he was prescribed 3 courses of phlebotomy with 400 cc were removed each time weekly.    History Of Present Illness:  Mr. Coelho is a 74 year old male who presents for f/up  erythrocytosis previously believed to be secondary to sleep apnea and iron deficiency. His daughter lives in the .  He is already on ASA 81 mg PO daily.  His ferritin has remained low and unchanged over at  least the last 3 years, most recently as below:  Results for ADRIAN BELL (MRN [id number]) as of 3/12/2020 15:45   Ref. Range 10/8/2018 11:08 4/16/2019 15:00 12/5/2019 11:08 3/4/2020 09:05   Ferritin Latest Ref Range: 26 - 388 ng/mL 10 (L) 11 (L) 13 (L) 24 (L)     He has been on ferrous sulfate supplementation 3 times a week.  He has continued to use CPAP regularly.  However, his hemoglobin and hematocrit are in the high normal range, as below:  Results for ADRIAN BELL (MRN [id number]) as of 7/28/2020 12:27   Ref. Range 3/4/2020 09:05 5/7/2020 10:24 6/22/2020 10:30 6/30/2020 12:27 7/13/2020 12:20 7/27/2020 09:41   Hemoglobin Latest Ref Range: 13.3 - 17.7 g/dL 16.5 16.9 17.0 16.4 16.8 16.7   Results for ADRIAN BELL (MRN [id number]) as of 7/28/2020 12:27   Ref. Range 5/7/2020 10:24 6/22/2020 10:30 6/30/2020 12:27 7/13/2020 12:20 7/27/2020 09:41   Hematocrit Latest Ref Range: 40.0 - 53.0 % 50.6 51.2 48.1 48.8 49.1           His weight has been stable at around 239-240  Pounds in the last year.  He exercises regularly, daily for 70 minutes at home.  However, he has been feeling generally unwell and has noticed some generalized weakness more so compared to previous.  He denies any headache.  He reports that back in [facility] he will get phlebotomy if he had some blood in his saliva or  bloodshot eyes.  He has a history of elevated left hemidiaphragm and recently has been having intermittent left upper quadrant pain and proceeded with CT abdomen and pelvis with IV contrast in [facility] on February 27, 2020.  It demonstrated chronic prominent elevation of left hemidiaphragm with some left lower lobe atelectasis.  There was no acute chest disease and left ribs appeared normal on chest x-ray on February 27, 2020.  Pt reporting fatigue which has been previously relieved with phlebotomy in [facility] and he was also getting NS bolus with his phlebotomies.  I do not think it is unreasonable to proceed with one-time  phlebotomy and remove 500 cc x 1 with 500 cc normal saline bolus, while we are awaiting for the results of his work-up.  Even if we believe he has secondary erythrocytosis, if his hematocrit is over 50 like it was in March 4, it is reasonable to proceed with phlebotomy to see if there is any symptom improvement.  We will recheck hemoglobin and hematocrit in 2 to 3 weeks.    Addendum:  No mutations were identified in the analyzed gene regions of JAK2, CALR,   or MPL genes.     Addendum:  Pt is s/p phlebotomy on 3/16/2020. Can recheck Hb and hematocrit in 3-4 weeks.    Addendum: Had nocturnal oximetry and outside study from [facility] reviewed. Qualified for medicare group 1: O2 sat at or below 88% for 15 min (over 5 min during sleep qualifies). He is seeing Dr. WILFREDO Woody in a virtual visit on 6/29 and we will follow-up with him afterwards in a virtual visit.     S/p phlebotomy on 6/8/2020. HCT 51.2 on 6/22/2020 and then 48.1 on 6/30/2020.  He was sen by Dr. Woody and felt to have  rather mild sleep apnea and hypoxemia and significant erythrocytosis due to this level of hypoxemia seems atypical.  Plan is to continue auto PAP therapy and start supplemental O2 at 2L/min with CPAP   In addition, a complete 12 point  review of systems is negative.    Past Medical/Surgical History:  CAD, s/p PTCI  MURRAY  Secondary erythrocytosis  BPH   He has a Hx of chronic pancreatitis in the past. He has had a cholecystectomy in the past.  He has a Hx of diverticulits, recurrent x3 in the past.  He has a Hx of BPH, s/p TURP in [facility] and here has been following up with Dr. Infante.  He had a colonoscopy on 8/3/2017 - which showed Diverticulosis of colon and repeat was recommended in 10 years.     FHx and Soc Hx reviewed   He is a former smoker.    Past Surgical History:   Procedure Laterality Date     APPENDECTOMY       CYSTOSCOPY, TRANSURETHRAL RESECTION (TUR) PROSTATE, COMBINED     Cholecystectomy    Allergies:  Allergies as of  07/28/2020     (No Known Allergies)     Current Medications:  Current Outpatient Medications   Medication Sig Dispense Refill     aspirin 81 MG tablet Take  by mouth daily.       MAGNESIUM OXIDE PO        nebivolol (BYSTOLIC) 2.5 MG tablet Take 2.5 mg by mouth daily       nitroglycerin (NITROLINGUAL) 0.4 MG/SPRAY spray Place 1 spray under the tongue every 5 minutes as needed.       Omega-3 Fatty Acids (OMEGA-3 FISH OIL PO) Take 1 g by mouth daily       omeprazole (PRILOSEC) 20 MG capsule Take 1 capsule by mouth daily.       order for DME Equipment being ordered: Nocturnal Oxygen at 2L/min with CPAP 1 Device 99     order for DME Equipment being ordered: Oxygen    Oxygen at 2L/min with CPAP 1 Device 99     order for DME Equipment being ordered: Nocturnal O2 at 2L/min with CPAP 1 Device 99     pantoprazole (PROTONIX) 40 MG EC tablet Take 40 mg by mouth daily       rosuvastatin (CRESTOR) 20 MG tablet Take 40 mg by mouth daily           Physical Exam:   Wt 106.1 kg (234 lb)   BMI 33.58 kg/m      Wt Readings from Last 5 Encounters:   07/28/20 106.1 kg (234 lb)   06/26/20 108.9 kg (240 lb)   03/12/20 109.1 kg (240 lb 9.6 oz)   01/07/20 110.2 kg (243 lb)   12/05/19 109.8 kg (242 lb)     Constitutional: alert and in no distress  Eyes: No redness or discharge  Respiratory: No cough or labored breathing.  Musculoskeletal: Full range of motion in extremities.  Skin: no visible skin lesions or discoloration  Neurological: No tremors and denies headache.  Psychiatric: Mentation appears normal and affect is normal as well.  Alert and oriented x3.  The rest the comprehensive physical examination is deferred due to public health emergency video visit restrictions.    Laboratory/Imaging Studies    Orders Only on 03/04/2020   Component Date Value Ref Range Status     Erythropoietin 03/04/2020 13  4 - 27 mU/mL Final    Comment: (Note)  INTERPRETIVE INFORMATION: Erythropoietin  Normal serum concentrations of erythropoietin for 95% of    individuals with normal hematocrits range from 4-27 mU/mL.  As the hematocrit is lowered by iron deficiency, aplastic,   or hemolytic anemia, the concentration of erythropoietin   increases as shown in the graph below. In the absence of   anemia, elevated concentrations are seen in renal tumors,   as a manifestation of renal transplant rejection, and in   secondary polycythemia. Low values may be observed in   hemochromatosis.       Expected Erythropoietin Concentrations in Patients                    with Uncomplicated Anemia    Erythropoietin (mU/mL)               100,000 - +                         +                10,000 - +.......                         + .......                 1,000 - +    .......                         +     ........                   100 - +       ........                         +        ........                    10 - +          ........                                                    +---+---+---+---+---+---+                        10   20  30  40  50  60  70                               (Hematocrit %)           (Contributions To Nephrology 1988:66:54-62)  Decreased erythropoietin concentrations with an elevated   hematocrit are observed in patients with polycythemia rubra   vera, and with a decreased hematocrit in patients with HIV   infection who are receiving AZT.  Patients on AZT who have   anemia and erythropoietin concentrations of less than or   equal to 500 mU/mL may benefit from therapy with   recombinant EPO (:[id number]).  Performed by [facility],  [address] [place] [id number] [phone]  [email or web address], Pastor Her MD, Lab. Director       Ferritin 03/04/2020 24* 26 - 388 ng/mL Final     WBC 03/04/2020 6.4  4.0 - 11.0 10e9/L Final     RBC Count 03/04/2020 5.95* 4.4 - 5.9 10e12/L Final     Hemoglobin 03/04/2020 16.5  13.3 - 17.7 g/dL Final     Hematocrit 03/04/2020 51.3  40.0 - 53.0 % Final     MCV 03/04/2020 86  78 - 100 fl Final     MCH  03/04/2020 27.7  26.5 - 33.0 pg Final     MCHC 03/04/2020 32.2  31.5 - 36.5 g/dL Final     RDW 03/04/2020 16.7* 10.0 - 15.0 % Final     Platelet Count 03/04/2020 163  150 - 450 10e9/L Final       ASSESSMENT/PLAN:    Mr. 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 He has remained on  CPAP, with correction of his hemoglobin.. Hb is normal but ferritin is low but he reports an UGI bleed in 2016 in [facility], followed by 5 doses of IV iron (Monofer, 100 mg each dose, per patient).   He did respond to IV a total of 600 mg of Venofer which he received in April 2019, but subsequently had phlebotomy in [facility] for reportedly symptomatic erythrocytosis in July 2019 which negated the effect of IV Venofer.     1.  Erythrocytosis-in the past his diagnosis was secondary erythrocytosis associated with sleep apnea and Mariano 2 mutation analysis was negative.  However, his erythrocytosis is now returning despite regular CPAP use.  Repeat serum erythropoietin level is normal.  He recently had CT abdomen pelvis in an outside health system which showed no evidence of hepatosplenomegaly or any kidney tumors that could be secreting erythropoietin.  We will go ahead and evaluate for MPL and CALR mutation analysis and reevaluate for primary erythroidcytosis.  Currently he is declining potential bone marrow biopsy evaluation.  We will recheck his hemoglobin and hematocrit in 3 to 4 weeks or sooner as clinically necessary.      2. Iron deficiency -his ferritin is low normal but would not increase supplemental iron supplementation secondary to erythrocytosis.  Continue oral ferrous sulfate supplementation 2-3 times a week in the interim, while we are awaiting the results of his work-up.    At the end of our visit patient verbalized understanding and concurred with the plan.

## 2020-07-28 NOTE — LETTER
"    7/28/2020         RE: Martell Coelho  [address] [facility] [address]        Dear Colleague,    Thank you for referring your patient, Martell Coelho, to the [facility]. Please see a copy of my visit note below.    Martell Coelho is a 74 year old male who is being evaluated via a billable video visit.      The patient has been notified of following:     \"This video visit will be conducted via a call between you and your physician/provider. We have found that certain health care needs can be provided without the need for an in-person physical exam.  This service lets us provide the care you need with a video conversation.  If a prescription is necessary we can send it directly to your pharmacy.  If lab work is needed we can place an order for that and you can then stop by our lab to have the test done at a later time.    Video visits are billed at different rates depending on your insurance coverage.  Please reach out to your insurance provider with any questions.    If during the course of the call the physician/provider feels a video visit is not appropriate, you will not be charged for this service.\"    Patient has given verbal consent for Video visit? yes  Video-Visit Details    Type of service:  Video Visit    Video visit duration: 20 min  Originating Location (pt. Location):home  Distant Location (provider location):  [facility]     Platform used for Video Visit: En Mo MD, MD      Hematology Follow-up visit:  Date on this visit: Jul 28, 2020    Primary Care Physician: Marli Daly.  Sleep medicine: Dr. WILFREDO Woody  Diagnosis:   1.  Erythrocytosis   He transferred his care to me in June 2015, due to Dr. Barber leaving our practice. He originally met with Dr. Barber in 07/2013 when he was evaluated for erythrocytosis. He reports that in 2013 in [facility] he was noted to have Hb ede 18-20 range and underwent three phlebotomies every other day, 500 cc " removed each time. These were associated with lightheadedness. When he was seen by Dr. Barber his Hb was normal at 16.2 g/dl, HCT of 46.2. WBC was 5.5 with normal absolute differential counts and platelet count was 182. LFTS were normal, ferritin was 39. TSH was normal, LDH was normal. PSA was 0.42 and DAVID 2 mutation analysis was negative. At that time he was also diagnosed with MURRAY and his erythrocytosis was felt to be secondary to MURRAY and no phlebotomy was recommended. However, he went back to [facility] and in 2015 he was still having monthly phlebotomies to keep his HCT down although it sounds like HCT was in the mid 40s there. When we saw him in June 2015, his serum erythropoietin level was normal at 27. Hb was 14.1 and ferritin was low at 11. CT abdomen 6/18/2015 showed colonic diverticulosis without inflammatory change and elevation of L hemidiaphragm. Adrenals, spleen and pancreas and liver appeared normal.He has a Hx of CAD, s/p PTCIx2. He is active and exercises daily. In June 2015,  have recommended that he discontinue phlebotomy sessions in [facility] and have his CBCd checked annually. He reports having an EGD in [facility] in May 2016 when after the biopsy he had an UGI bleed. He required repeat EGD and cauterization of bleeding area, per patient.  He was feeling fatigued and his ferritin was low in 2019.  We have tried to give him IV ferric carboxymaltose but his insurance would not approve the infusion and he would incur out-of-pocket costs.  He declined proceeding with IV Injectafer if he had to pay out of pocket.  He did decide to proceed with 2 doses of Venofer in April 2019, 300 mg each.  Afterwards, he went to [facility] and his ferritin was up to 100 per patient in May 2019 and down to 15 June 2019.  Apparently he then had hemoglobin checked in July 2019 in [facility], and he was prescribed 3 courses of phlebotomy with 400 cc were removed each time weekly.    History Of Present Illness:  Mr. Coelho is a 74 year old  male who presents for f/up  erythrocytosis previously believed to be secondary to sleep apnea and iron deficiency. His daughter lives in the .  He is already on ASA 81 mg PO daily.  His ferritin has remained low and unchanged over at least the last 3 years, most recently as below:  Results for ADRIAN BELL (MRN [id number]) as of 3/12/2020 15:45   Ref. Range 10/8/2018 11:08 4/16/2019 15:00 12/5/2019 11:08 3/4/2020 09:05   Ferritin Latest Ref Range: 26 - 388 ng/mL 10 (L) 11 (L) 13 (L) 24 (L)     He has been on ferrous sulfate supplementation 3 times a week.  He has continued to use CPAP regularly.  However, his hemoglobin and hematocrit are in the high normal range, as below:  Results for ADRIAN BELL (MRN [id number]) as of 7/28/2020 12:27   Ref. Range 3/4/2020 09:05 5/7/2020 10:24 6/22/2020 10:30 6/30/2020 12:27 7/13/2020 12:20 7/27/2020 09:41   Hemoglobin Latest Ref Range: 13.3 - 17.7 g/dL 16.5 16.9 17.0 16.4 16.8 16.7   Results for ADRIAN BELL (MRN [id number]) as of 7/28/2020 12:27   Ref. Range 5/7/2020 10:24 6/22/2020 10:30 6/30/2020 12:27 7/13/2020 12:20 7/27/2020 09:41   Hematocrit Latest Ref Range: 40.0 - 53.0 % 50.6 51.2 48.1 48.8 49.1           His weight has been stable at around 239-240  Pounds in the last year.  He exercises regularly, daily for 70 minutes at home.  However, he has been feeling generally unwell and has noticed some generalized weakness more so compared to previous.  He denies any headache.  He reports that back in [facility] he will get phlebotomy if he had some blood in his saliva or  bloodshot eyes.  He has a history of elevated left hemidiaphragm and recently has been having intermittent left upper quadrant pain and proceeded with CT abdomen and pelvis with IV contrast in [facility] on February 27, 2020.  It demonstrated chronic prominent elevation of left hemidiaphragm with some left lower lobe atelectasis.  There was no acute chest disease and left ribs appeared normal  on chest x-ray on February 27, 2020.  Pt reporting fatigue which has been previously relieved with phlebotomy in [facility] and he was also getting NS bolus with his phlebotomies.  I do not think it is unreasonable to proceed with one-time phlebotomy and remove 500 cc x 1 with 500 cc normal saline bolus, while we are awaiting for the results of his work-up.  Even if we believe he has secondary erythrocytosis, if his hematocrit is over 50 like it was in March 4, it is reasonable to proceed with phlebotomy to see if there is any symptom improvement.  We will recheck hemoglobin and hematocrit in 2 to 3 weeks.    Addendum:  No mutations were identified in the analyzed gene regions of JAK2, CALR,   or MPL genes.     Addendum:  Pt is s/p phlebotomy on 3/16/2020. Can recheck Hb and hematocrit in 3-4 weeks.    Addendum: Had nocturnal oximetry and outside study from [facility] reviewed. Qualified for medicare group 1: O2 sat at or below 88% for 15 min (over 5 min during sleep qualifies). He is seeing Dr. WILFREDO Woody in a virtual visit on 6/29 and we will follow-up with him afterwards in a virtual visit.     S/p phlebotomy on 6/8/2020. HCT 51.2 on 6/22/2020 and then 48.1 on 6/30/2020.  He was sen by Dr. Woody and felt to have  rather mild sleep apnea and hypoxemia and significant erythrocytosis due to this level of hypoxemia seems atypical.  Plan is to continue auto PAP therapy and start supplemental O2 at 2L/min with CPAP   In addition, a complete 12 point  review of systems is negative.    Past Medical/Surgical History:  CAD, s/p PTCI  MURRAY  Secondary erythrocytosis  BPH   He has a Hx of chronic pancreatitis in the past. He has had a cholecystectomy in the past.  He has a Hx of diverticulits, recurrent x3 in the past.  He has a Hx of BPH, s/p TURP in [facility] and here has been following up with Dr. Infante.  He had a colonoscopy on 8/3/2017 - which showed Diverticulosis of colon and repeat was recommended in 10 years.     FHx and  Soc Hx reviewed   He is a former smoker.    Past Surgical History:   Procedure Laterality Date     APPENDECTOMY       CYSTOSCOPY, TRANSURETHRAL RESECTION (TUR) PROSTATE, COMBINED     Cholecystectomy    Allergies:  Allergies as of 07/28/2020     (No Known Allergies)     Current Medications:  Current Outpatient Medications   Medication Sig Dispense Refill     aspirin 81 MG tablet Take  by mouth daily.       MAGNESIUM OXIDE PO        nebivolol (BYSTOLIC) 2.5 MG tablet Take 2.5 mg by mouth daily       nitroglycerin (NITROLINGUAL) 0.4 MG/SPRAY spray Place 1 spray under the tongue every 5 minutes as needed.       Omega-3 Fatty Acids (OMEGA-3 FISH OIL PO) Take 1 g by mouth daily       omeprazole (PRILOSEC) 20 MG capsule Take 1 capsule by mouth daily.       order for DME Equipment being ordered: Nocturnal Oxygen at 2L/min with CPAP 1 Device 99     order for DME Equipment being ordered: Oxygen    Oxygen at 2L/min with CPAP 1 Device 99     order for DME Equipment being ordered: Nocturnal O2 at 2L/min with CPAP 1 Device 99     pantoprazole (PROTONIX) 40 MG EC tablet Take 40 mg by mouth daily       rosuvastatin (CRESTOR) 20 MG tablet Take 40 mg by mouth daily           Physical Exam:   Wt 106.1 kg (234 lb)   BMI 33.58 kg/m      Wt Readings from Last 5 Encounters:   07/28/20 106.1 kg (234 lb)   06/26/20 108.9 kg (240 lb)   03/12/20 109.1 kg (240 lb 9.6 oz)   01/07/20 110.2 kg (243 lb)   12/05/19 109.8 kg (242 lb)     Constitutional: alert and in no distress  Eyes: No redness or discharge  Respiratory: No cough or labored breathing.  Musculoskeletal: Full range of motion in extremities.  Skin: no visible skin lesions or discoloration  Neurological: No tremors and denies headache.  Psychiatric: Mentation appears normal and affect is normal as well.  Alert and oriented x3.  The rest the comprehensive physical examination is deferred due to public health emergency video visit restrictions.    Laboratory/Imaging Studies    Orders  Only on 03/04/2020   Component Date Value Ref Range Status     Erythropoietin 03/04/2020 13  4 - 27 mU/mL Final    Comment: (Note)  INTERPRETIVE INFORMATION: Erythropoietin  Normal serum concentrations of erythropoietin for 95% of   individuals with normal hematocrits range from 4-27 mU/mL.  As the hematocrit is lowered by iron deficiency, aplastic,   or hemolytic anemia, the concentration of erythropoietin   increases as shown in the graph below. In the absence of   anemia, elevated concentrations are seen in renal tumors,   as a manifestation of renal transplant rejection, and in   secondary polycythemia. Low values may be observed in   hemochromatosis.       Expected Erythropoietin Concentrations in Patients                    with Uncomplicated Anemia    Erythropoietin (mU/mL)               100,000 - +                         +                10,000 - +.......                         + .......                 1,000 - +    .......                         +     ........                   100 - +       ........                         +        ........                    10 - +          ........                                                    +---+---+---+---+---+---+                        10   20  30  40  50  60  70                               (Hematocrit %)           (Contributions To Nephrology 1988:66:54-62)  Decreased erythropoietin concentrations with an elevated   hematocrit are observed in patients with polycythemia rubra   vera, and with a decreased hematocrit in patients with HIV   infection who are receiving AZT.  Patients on AZT who have   anemia and erythropoietin concentrations of less than or   equal to 500 mU/mL may benefit from therapy with   recombinant EPO ([facility] [id number]).  Performed by [facility],  [address] [place] [id number] [phone]  [email or web address], Pastor Her MD, Lab. Director       Ferritin 03/04/2020 24* 26 - 388 ng/mL Final     WBC 03/04/2020 6.4  4.0 -  11.0 10e9/L Final     RBC Count 03/04/2020 5.95* 4.4 - 5.9 10e12/L Final     Hemoglobin 03/04/2020 16.5  13.3 - 17.7 g/dL Final     Hematocrit 03/04/2020 51.3  40.0 - 53.0 % Final     MCV 03/04/2020 86  78 - 100 fl Final     MCH 03/04/2020 27.7  26.5 - 33.0 pg Final     MCHC 03/04/2020 32.2  31.5 - 36.5 g/dL Final     RDW 03/04/2020 16.7* 10.0 - 15.0 % Final     Platelet Count 03/04/2020 163  150 - 450 10e9/L Final       ASSESSMENT/PLAN:    Mr. 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 He has remained on  CPAP, with correction of his hemoglobin.. Hb is normal but ferritin is low but he reports an UGI bleed in 2016 in [facility], followed by 5 doses of IV iron (Monofer, 100 mg each dose, per patient).   He did respond to IV a total of 600 mg of Venofer which he received in April 2019, but subsequently had phlebotomy in [facility] for reportedly symptomatic erythrocytosis in July 2019 which negated the effect of IV Venofer.     1.  Erythrocytosis-in the past his diagnosis was secondary erythrocytosis associated with sleep apnea and Mariano 2 mutation analysis was negative.  However, his erythrocytosis is now returning despite regular CPAP use.  Repeat serum erythropoietin level is normal.  He recently had CT abdomen pelvis in an outside health system which showed no evidence of hepatosplenomegaly or any kidney tumors that could be secreting erythropoietin.  We will go ahead and evaluate for MPL and CALR mutation analysis and reevaluate for primary erythroidcytosis.  Currently he is declining potential bone marrow biopsy evaluation.  We will recheck his hemoglobin and hematocrit in 3 to 4 weeks or sooner as clinically necessary.      2. Iron deficiency -his ferritin is low normal but would not increase supplemental iron supplementation secondary to erythrocytosis.  Continue oral ferrous sulfate supplementation 2-3 times a week in the interim, while we are awaiting the results  of his work-up.    At the end of our visit patient verbalized understanding and concurred with the plan.        Again, thank you for allowing me to participate in the care of your patient.        Sincerely,        Rula Mo MD, MD

## 2020-08-27 DIAGNOSIS — D75.1 ERYTHROCYTOSIS: ICD-10-CM

## 2020-08-27 LAB
HCT VFR BLD AUTO: 49.2 % (ref 40–53)
HGB BLD-MCNC: 17 G/DL (ref 13.3–17.7)

## 2020-08-27 PROCEDURE — 36415 COLL VENOUS BLD VENIPUNCTURE: CPT | Performed by: INTERNAL MEDICINE

## 2020-08-27 PROCEDURE — 85014 HEMATOCRIT: CPT | Performed by: INTERNAL MEDICINE

## 2020-08-27 PROCEDURE — 85018 HEMOGLOBIN: CPT | Performed by: INTERNAL MEDICINE

## 2020-09-25 DIAGNOSIS — D75.1 ERYTHROCYTOSIS: ICD-10-CM

## 2020-09-25 LAB
HCT VFR BLD AUTO: 52 % (ref 40–53)
HGB BLD-MCNC: 17.5 G/DL (ref 13.3–17.7)

## 2020-09-25 PROCEDURE — 85018 HEMOGLOBIN: CPT | Performed by: INTERNAL MEDICINE

## 2020-09-25 PROCEDURE — 36415 COLL VENOUS BLD VENIPUNCTURE: CPT | Performed by: INTERNAL MEDICINE

## 2020-09-25 PROCEDURE — 85014 HEMATOCRIT: CPT | Performed by: INTERNAL MEDICINE

## 2020-09-30 ENCOUNTER — INFUSION THERAPY VISIT (OUTPATIENT)
Dept: INFUSION THERAPY | Facility: CLINIC | Age: 75
End: 2020-09-30
Payer: MEDICARE

## 2020-09-30 VITALS
DIASTOLIC BLOOD PRESSURE: 76 MMHG | WEIGHT: 238 LBS | SYSTOLIC BLOOD PRESSURE: 126 MMHG | TEMPERATURE: 98.2 F | OXYGEN SATURATION: 98 % | BODY MASS INDEX: 34.15 KG/M2 | RESPIRATION RATE: 16 BRPM | HEART RATE: 68 BPM

## 2020-09-30 DIAGNOSIS — D75.1 ERYTHROCYTOSIS: Primary | ICD-10-CM

## 2020-09-30 DIAGNOSIS — R53.83 OTHER FATIGUE: ICD-10-CM

## 2020-09-30 PROCEDURE — 99195 PHLEBOTOMY: CPT | Performed by: INTERNAL MEDICINE

## 2020-09-30 PROCEDURE — 99207 ZZC NO CHARGE LOS: CPT

## 2020-09-30 RX ADMIN — Medication 500 ML: at 14:10

## 2020-09-30 ASSESSMENT — PAIN SCALES - GENERAL: PAINLEVEL: NO PAIN (0)

## 2020-09-30 NOTE — PROGRESS NOTES
"Prior to scheduled phlebotomy verified patient identity using patient's name and date of birth.  Lab(s) verified: hematocrit 52 on 9/25    Peripheral Access:  Accessed: left AC with 20ga autoguard instyte catheter without difficulty.  Positive blood return.  No redness, edema or complaints of discomfort.  Pt tolerated phlebotomy, 450 mls of blood removed via gravity using whole blood collection bag and scale - goal was to remove 500 mls but blood was flowing very slowly and pts arm was feeling very sore from pumping.  500 mls of oral fluid replaced and 500 mls of IV fluids given via PIV.  Pt tolerated procedure without adverse reactions.  Pt denies headache, nausea or discomfort.  IV flushed with 10mls 0.9% Normal Saline after completion of procedure and discontinued per policy with pressure dressing applied.      Pre Vital Signs including O2 saturation documented in \"Vitals\"  Post Vital Signs see flowsheets.    Reviewed and provided discharge instructions regarding therapeutic phlebotomy.  Pt verbalized understanding.    Reviewed appointments and copy of schedule given to pt.  Return to clinic in: 1 month.       Ambulation steady.  Pt alert and orientated upon discharge.                                    "

## 2020-10-21 NOTE — PROGRESS NOTES
"Martell Coelho is a 74 year old male who is being evaluated via a billable video visit.      The patient has been notified of following:     \"This video visit will be conducted via a call between you and your physician/provider. We have found that certain health care needs can be provided without the need for an in-person physical exam.  This service lets us provide the care you need with a video conversation.  If a prescription is necessary we can send it directly to your pharmacy.  If lab work is needed we can place an order for that and you can then stop by our lab to have the test done at a later time.    Video visits are billed at different rates depending on your insurance coverage.  Please reach out to your insurance provider with any questions.    If during the course of the call the physician/provider feels a video visit is not appropriate, you will not be charged for this service.\"    Patient has given verbal consent for Video visit? yes  Video-Visit Details    Type of service:  Video Visit    Video visit duration: 20 min  Originating Location (pt. Location):home  Distant Location (provider location):  [facility]     Platform used for Video Visit: Nita Mo MD, MD      Hematology Follow-up visit:  Date on this visit: Oct 22, 2020    Primary Care Physician: Marli Daly.  Sleep medicine: Dr. WILFREDO Woody  Diagnosis:   1.  Erythrocytosis   He transferred his care to me in June 2015, due to Dr. Barber leaving our practice. He originally met with Dr. Barber in 07/2013 when he was evaluated for erythrocytosis. He reports that in 2013 in [facility] he was noted to have Hb ede 18-20 range and underwent three phlebotomies every other day, 500 cc removed each time. These were associated with lightheadedness. When he was seen by Dr. Barber his Hb was normal at 16.2 g/dl, HCT of 46.2. WBC was 5.5 with normal absolute differential counts and platelet count was 182. LFTS were normal, " ferritin was 39. TSH was normal, LDH was normal. PSA was 0.42 and DAVID 2 mutation analysis was negative. At that time he was also diagnosed with MURRAY and his erythrocytosis was felt to be secondary to MURRAY and no phlebotomy was recommended. However, he went back to [facility] and in 2015 he was still having monthly phlebotomies to keep his HCT down although it sounds like HCT was in the mid 40s there. When we saw him in June 2015, his serum erythropoietin level was normal at 27. Hb was 14.1 and ferritin was low at 11. CT abdomen 6/18/2015 showed colonic diverticulosis without inflammatory change and elevation of L hemidiaphragm. Adrenals, spleen and pancreas and liver appeared normal.He has a Hx of CAD, s/p PTCIx2. He is active and exercises daily. In June 2015,  have recommended that he discontinue phlebotomy sessions in [facility] and have his CBCd checked annually. He reports having an EGD in [facility] in May 2016 when after the biopsy he had an UGI bleed. He required repeat EGD and cauterization of bleeding area, per patient.  He was feeling fatigued and his ferritin was low in 2019.  We have tried to give him IV ferric carboxymaltose but his insurance would not approve the infusion and he would incur out-of-pocket costs.  He declined proceeding with IV Injectafer if he had to pay out of pocket.  He did decide to proceed with 2 doses of Venofer in April 2019, 300 mg each.  Afterwards, he went to [facility] and his ferritin was up to 100 per patient in May 2019 and down to 15 June 2019.  Apparently he then had hemoglobin checked in July 2019 in [facility], and he was prescribed 3 courses of phlebotomy with 400 cc were removed each time weekly.  No mutations were identified in the analyzed gene regions of JAK2, CALR,   or MPL genes.  Serum erythropoietin level was normal at 13 on March 4 2020.   Had nocturnal oximetry and outside study from [facility] reviewed. Qualified for medicare group 1: O2 sat at or below 88% for 15 min (over  5 min during sleep qualifies). He has been  seeing Dr. WILFREDO Woody from sleep medicine.   He was sen by Dr. Woody and felt to have  rather mild sleep apnea and hypoxemia and significant erythrocytosis due to this level of hypoxemia seems atypical.  Plan is to continue auto PAP therapy and start supplemental O2 at 2L/min with CPAP   CT abdomen and pelvis with IV contrast in [facility] on February 27, 2020.  It demonstrated chronic prominent elevation of left hemidiaphragm with some left lower lobe atelectasis.     History Of Present Illness:  Mr. Coelho is a 75 year old male who presents for f/up  erythrocytosis previously believed to be secondary to sleep apnea and iron deficiency. His daughter lives in the .  He is already on ASA 81 mg PO daily.  His ferritin has remained low and unchanged over at least the last 3 years,in 10s to 20s.   He has been on ferrous sulfate supplementation 3 times a week.  He has continued to use CPAP regularly .  However, his hemoglobin and hematocrit are in the high normal range, as below:    Results for ADRIAN COELHO (MRN [id number]) as of 10/22/2020 11:40   Ref. Range 7/13/2020 12:20 7/27/2020 09:41 8/27/2020 09:52 9/25/2020 09:39   Hemoglobin Latest Ref Range: 13.3 - 17.7 g/dL 16.8 16.7 17.0 17.5     Results for ADRIAN COELHO (MRN [id number]) as of 10/22/2020 11:40   Ref. Range 6/22/2020 10:30 6/30/2020 12:27 7/13/2020 12:20 7/27/2020 09:41 8/27/2020 09:52 9/25/2020 09:39   Hematocrit Latest Ref Range: 40.0 - 53.0 % 51.2 48.1 48.8 49.1 49.2 52.0   His last phlebotomy was in September 2020, and he skipped 1 prior to that.     He exercises regularly, daily for over an hour at home.   He has felt better after phlebotomy.  Since last winter he has been feeling burning in his feet which was intermittent and now is constant.  It is bothersome to him.  I have reviewed outside medical records from [facility].  He had a carotid artery ultrasound around October 20th  which showed less than 50% stenosis in the carotid arteries.  He also had bilateral lower extremity ISABEL which showed no significant stenosis.  In addition, a complete 12 point  review of systems is negative.    Past Medical/Surgical History:  CAD, s/p PTCI  MURRAY  Secondary erythrocytosis  BPH   He has a Hx of chronic pancreatitis in the past. He has had a cholecystectomy in the past.  He has a Hx of diverticulits, recurrent x3 in the past.  He has a Hx of BPH, s/p TURP in [facility] and here has been following up with Dr. Infante.  He had a colonoscopy on 8/3/2017 - which showed Diverticulosis of colon and repeat was recommended in 10 years.     FHx and Soc Hx reviewed   He is a former smoker.    Past Surgical History:   Procedure Laterality Date     APPENDECTOMY       CYSTOSCOPY, TRANSURETHRAL RESECTION (TUR) PROSTATE, COMBINED     Cholecystectomy    Allergies:  Allergies as of 10/22/2020     (No Known Allergies)     Current Medications:  Current Outpatient Medications   Medication Sig Dispense Refill     aspirin 81 MG tablet Take  by mouth daily.       MAGNESIUM OXIDE PO        nebivolol (BYSTOLIC) 2.5 MG tablet Take 2.5 mg by mouth daily       nitroglycerin (NITROLINGUAL) 0.4 MG/SPRAY spray Place 1 spray under the tongue every 5 minutes as needed.       Omega-3 Fatty Acids (OMEGA-3 FISH OIL PO) Take 1 g by mouth daily       omeprazole (PRILOSEC) 20 MG capsule Take 1 capsule by mouth daily.       order for DME Equipment being ordered: Nocturnal Oxygen at 2L/min with CPAP 1 Device 99     order for DME Equipment being ordered: Oxygen    Oxygen at 2L/min with CPAP 1 Device 99     order for DME Equipment being ordered: Nocturnal O2 at 2L/min with CPAP 1 Device 99     pantoprazole (PROTONIX) 40 MG EC tablet Take 40 mg by mouth daily       rosuvastatin (CRESTOR) 20 MG tablet Take 40 mg by mouth daily           Physical Exam:       Wt Readings from Last 5 Encounters:   09/30/20 108 kg (238 lb)   07/28/20 106.1 kg (234 lb)    06/26/20 108.9 kg (240 lb)   03/12/20 109.1 kg (240 lb 9.6 oz)   01/07/20 110.2 kg (243 lb)     Constitutional: alert and in no distress  Eyes: No redness or discharge  Respiratory: No cough or labored breathing.  Musculoskeletal: Full range of motion in extremities.  Skin: no visible skin lesions or discoloration  Neurological: No tremors and denies headache.  Psychiatric: Mentation appears normal and affect is normal as well.  Alert and oriented x3.  The rest the comprehensive physical examination is deferred due to public health emergency video visit restrictions.    Laboratory/Imaging Studies  Labs reviewed and documented in the EMR.  Outside labs also reviewed.    ASSESSMENT/PLAN:    Mr. 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 He has remained on  CPAP, with correction of his hemoglobin.. Hb is normal but ferritin is low but he reports an UGI bleed in 2016 in [facility], followed by 5 doses of IV iron (Monofer, 100 mg each dose, per patient).   He did respond to IV a total of 600 mg of Venofer which he received in April 2019, but subsequently had phlebotomy in [facility] for reportedly symptomatic erythrocytosis in July 2019 which negated the effect of IV Venofer.     1.  Erythrocytosis-in the past his diagnosis was secondary erythrocytosis associated with sleep apnea and Mariano 2 mutation analysis was negative.  However, his erythrocytosis is now returning despite regular CPAP with oxygen supplementation use.  Repeat serum erythropoietin level is normal.  He recently had CT abdomen pelvis in an outside health system which showed no evidence of hepatosplenomegaly or any kidney tumors that could be secreting erythropoietin.  JAK2,  MPL and CALR mutation analysis was negative.  He was not interested in proceeding with bone marrow biopsy evaluation, and I am not sure how helpful that was being his sedation.  Continue checking hemoglobin hematocrit monthly and  continue phlebotomy for hematocrit over 50.  Patient feels clinically better following phlebotomy once his hematocrit hits around 50.    2. Iron deficiency -his ferritin is low normal but would not increase supplemental iron supplementation secondary to erythrocytosis.  Continue oral ferrous sulfate supplementation 2-3 times a week in the interim, while we are awaiting the results of his work-up.    3.  Persistent burning in his feet-he will try multivitamin and vitamin B complex supplementation in the bed does not help, I have also provided him with neurology referral for further evaluation.  At the end of our visit patient verbalized understanding and concurred with the plan.    Addendum: Proceed with phlebotomy for HCT of 50.  Component      Latest Ref Rng & Units 12/28/2020   Hematocrit      40.0 - 53.0 % 50.0   Hemoglobin      13.3 - 17.7 g/dL 17.1

## 2020-10-22 ENCOUNTER — VIRTUAL VISIT (OUTPATIENT)
Dept: ONCOLOGY | Facility: CLINIC | Age: 75
End: 2020-10-22
Payer: MEDICARE

## 2020-10-22 VITALS — BODY MASS INDEX: 33.86 KG/M2 | WEIGHT: 236 LBS

## 2020-10-22 DIAGNOSIS — G62.9 NEUROPATHY: Primary | ICD-10-CM

## 2020-10-22 PROCEDURE — 99214 OFFICE O/P EST MOD 30 MIN: CPT | Mod: 95 | Performed by: INTERNAL MEDICINE

## 2020-10-22 ASSESSMENT — PAIN SCALES - GENERAL: PAINLEVEL: NO PAIN (0)

## 2020-10-22 NOTE — LETTER
"    10/22/2020         RE: Martell Coelho  [address] [address]        Dear Colleague,    Thank you for referring your patient, Martell Coelho, to the [facility]. Please see a copy of my visit note below.    Martell Coelho is a 74 year old male who is being evaluated via a billable video visit.      The patient has been notified of following:     \"This video visit will be conducted via a call between you and your physician/provider. We have found that certain health care needs can be provided without the need for an in-person physical exam.  This service lets us provide the care you need with a video conversation.  If a prescription is necessary we can send it directly to your pharmacy.  If lab work is needed we can place an order for that and you can then stop by our lab to have the test done at a later time.    Video visits are billed at different rates depending on your insurance coverage.  Please reach out to your insurance provider with any questions.    If during the course of the call the physician/provider feels a video visit is not appropriate, you will not be charged for this service.\"    Patient has given verbal consent for Video visit? yes  Video-Visit Details    Type of service:  Video Visit    Video visit duration: 20 min  Originating Location (pt. Location):home  Distant Location (provider location):  [facility]     Platform used for Video Visit: Nita Mo MD, MD      Hematology Follow-up visit:  Date on this visit: Oct 22, 2020    Primary Care Physician: Marli Daly.  Sleep medicine: Dr. WILFREDO Woody  Diagnosis:   1.  Erythrocytosis   He transferred his care to me in June 2015, due to Dr. Barber leaving our practice. He originally met with Dr. Barber in 07/2013 when he was evaluated for erythrocytosis. He reports that in 2013 in [facility] he was noted to have Hb ede 18-20 range and underwent three phlebotomies every " other day, 500 cc removed each time. These were associated with lightheadedness. When he was seen by Dr. Barber his Hb was normal at 16.2 g/dl, HCT of 46.2. WBC was 5.5 with normal absolute differential counts and platelet count was 182. LFTS were normal, ferritin was 39. TSH was normal, LDH was normal. PSA was 0.42 and DAVID 2 mutation analysis was negative. At that time he was also diagnosed with MURRAY and his erythrocytosis was felt to be secondary to MURRAY and no phlebotomy was recommended. However, he went back to [facility] and in 2015 he was still having monthly phlebotomies to keep his HCT down although it sounds like HCT was in the mid 40s there. When we saw him in June 2015, his serum erythropoietin level was normal at 27. Hb was 14.1 and ferritin was low at 11. CT abdomen 6/18/2015 showed colonic diverticulosis without inflammatory change and elevation of L hemidiaphragm. Adrenals, spleen and pancreas and liver appeared normal.He has a Hx of CAD, s/p PTCIx2. He is active and exercises daily. In June 2015,  have recommended that he discontinue phlebotomy sessions in [facility] and have his CBCd checked annually. He reports having an EGD in [facility] in May 2016 when after the biopsy he had an UGI bleed. He required repeat EGD and cauterization of bleeding area, per patient.  He was feeling fatigued and his ferritin was low in 2019.  We have tried to give him IV ferric carboxymaltose but his insurance would not approve the infusion and he would incur out-of-pocket costs.  He declined proceeding with IV Injectafer if he had to pay out of pocket.  He did decide to proceed with 2 doses of Venofer in April 2019, 300 mg each.  Afterwards, he went to [facility] and his ferritin was up to 100 per patient in May 2019 and down to 15 June 2019.  Apparently he then had hemoglobin checked in July 2019 in [facility], and he was prescribed 3 courses of phlebotomy with 400 cc were removed each time weekly.  No mutations were identified in the  analyzed gene regions of JAK2, CALR,   or MPL genes.  Serum erythropoietin level was normal at 13 on March 4 2020.   Had nocturnal oximetry and outside study from [facility] reviewed. Qualified for medicare group 1: O2 sat at or below 88% for 15 min (over 5 min during sleep qualifies). He has been  seeing Dr. WILFREDO Woody from sleep medicine.   He was sen by Dr. Woody and felt to have  rather mild sleep apnea and hypoxemia and significant erythrocytosis due to this level of hypoxemia seems atypical.  Plan is to continue auto PAP therapy and start supplemental O2 at 2L/min with CPAP   CT abdomen and pelvis with IV contrast in [facility] on February 27, 2020.  It demonstrated chronic prominent elevation of left hemidiaphragm with some left lower lobe atelectasis.     History Of Present Illness:  Mr. Coelho is a 75 year old male who presents for f/up  erythrocytosis previously believed to be secondary to sleep apnea and iron deficiency. His daughter lives in the .  He is already on ASA 81 mg PO daily.  His ferritin has remained low and unchanged over at least the last 3 years,in 10s to 20s.   He has been on ferrous sulfate supplementation 3 times a week.  He has continued to use CPAP regularly .  However, his hemoglobin and hematocrit are in the high normal range, as below:    Results for ADRIAN COELHO (MRN [id number]) as of 10/22/2020 11:40   Ref. Range 7/13/2020 12:20 7/27/2020 09:41 8/27/2020 09:52 9/25/2020 09:39   Hemoglobin Latest Ref Range: 13.3 - 17.7 g/dL 16.8 16.7 17.0 17.5     Results for ADRIAN COELHO (MRN [id number]) as of 10/22/2020 11:40   Ref. Range 6/22/2020 10:30 6/30/2020 12:27 7/13/2020 12:20 7/27/2020 09:41 8/27/2020 09:52 9/25/2020 09:39   Hematocrit Latest Ref Range: 40.0 - 53.0 % 51.2 48.1 48.8 49.1 49.2 52.0   His last phlebotomy was in September 2020, and he skipped 1 prior to that.     He exercises regularly, daily for over an hour at home.   He has felt better after phlebotomy.   Since last winter he has been feeling burning in his feet which was intermittent and now is constant.  It is bothersome to him.  I have reviewed outside medical records from [facility].  He had a carotid artery ultrasound around October 20th which showed less than 50% stenosis in the carotid arteries.  He also had bilateral lower extremity ISABEL which showed no significant stenosis.  In addition, a complete 12 point  review of systems is negative.    Past Medical/Surgical History:  CAD, s/p PTCI  MURRAY  Secondary erythrocytosis  BPH   He has a Hx of chronic pancreatitis in the past. He has had a cholecystectomy in the past.  He has a Hx of diverticulits, recurrent x3 in the past.  He has a Hx of BPH, s/p TURP in [facility] and here has been following up with Dr. Infante.  He had a colonoscopy on 8/3/2017 - which showed Diverticulosis of colon and repeat was recommended in 10 years.     FHx and Soc Hx reviewed   He is a former smoker.    Past Surgical History:   Procedure Laterality Date     APPENDECTOMY       CYSTOSCOPY, TRANSURETHRAL RESECTION (TUR) PROSTATE, COMBINED     Cholecystectomy    Allergies:  Allergies as of 10/22/2020     (No Known Allergies)     Current Medications:  Current Outpatient Medications   Medication Sig Dispense Refill     aspirin 81 MG tablet Take  by mouth daily.       MAGNESIUM OXIDE PO        nebivolol (BYSTOLIC) 2.5 MG tablet Take 2.5 mg by mouth daily       nitroglycerin (NITROLINGUAL) 0.4 MG/SPRAY spray Place 1 spray under the tongue every 5 minutes as needed.       Omega-3 Fatty Acids (OMEGA-3 FISH OIL PO) Take 1 g by mouth daily       omeprazole (PRILOSEC) 20 MG capsule Take 1 capsule by mouth daily.       order for DME Equipment being ordered: Nocturnal Oxygen at 2L/min with CPAP 1 Device 99     order for DME Equipment being ordered: Oxygen    Oxygen at 2L/min with CPAP 1 Device 99     order for DME Equipment being ordered: Nocturnal O2 at 2L/min with CPAP 1 Device 99      pantoprazole (PROTONIX) 40 MG EC tablet Take 40 mg by mouth daily       rosuvastatin (CRESTOR) 20 MG tablet Take 40 mg by mouth daily           Physical Exam:       Wt Readings from Last 5 Encounters:   09/30/20 108 kg (238 lb)   07/28/20 106.1 kg (234 lb)   06/26/20 108.9 kg (240 lb)   03/12/20 109.1 kg (240 lb 9.6 oz)   01/07/20 110.2 kg (243 lb)     Constitutional: alert and in no distress  Eyes: No redness or discharge  Respiratory: No cough or labored breathing.  Musculoskeletal: Full range of motion in extremities.  Skin: no visible skin lesions or discoloration  Neurological: No tremors and denies headache.  Psychiatric: Mentation appears normal and affect is normal as well.  Alert and oriented x3.  The rest the comprehensive physical examination is deferred due to public health emergency video visit restrictions.    Laboratory/Imaging Studies  Labs reviewed and documented in the EMR.  Outside labs also reviewed.    ASSESSMENT/PLAN:    Mr. 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 He has remained on  CPAP, with correction of his hemoglobin.. Hb is normal but ferritin is low but he reports an [facility] bleed in [facility] in [facility], followed by 5 doses of IV iron (Monofer, 100 mg each dose, per patient).   He did respond to IV a total of 600 mg of Venofer which he received in April 2019, but subsequently had phlebotomy in [facility] for reportedly symptomatic erythrocytosis in July 2019 which negated the effect of IV Venofer.     1.  Erythrocytosis-in the past his diagnosis was secondary erythrocytosis associated with sleep apnea and Mariano 2 mutation analysis was negative.  However, his erythrocytosis is now returning despite regular CPAP with oxygen supplementation use.  Repeat serum erythropoietin level is normal.  He recently had CT abdomen pelvis in an outside health system which showed no evidence of hepatosplenomegaly or any kidney tumors that could be secreting  erythropoietin.  JAK2,  MPL and CALR mutation analysis was negative.  He was not interested in proceeding with bone marrow biopsy evaluation, and I am not sure how helpful that was being his sedation.  Continue checking hemoglobin hematocrit monthly and continue phlebotomy for hematocrit over 50.  Patient feels clinically better following phlebotomy once his hematocrit hits around 50.    2. Iron deficiency -his ferritin is low normal but would not increase supplemental iron supplementation secondary to erythrocytosis.  Continue oral ferrous sulfate supplementation 2-3 times a week in the interim, while we are awaiting the results of his work-up.    3.  Persistent burning in his feet-he will try multivitamin and vitamin B complex supplementation in the bed does not help, I have also provided him with neurology referral for further evaluation.  At the end of our visit patient verbalized understanding and concurred with the plan.          Again, thank you for allowing me to participate in the care of your patient.        Sincerely,        Rula Mo MD, MD

## 2020-10-22 NOTE — NURSING NOTE
SYMPTOM QUESTIONNAIRE    Pain: no    Nausea/Vomiting: no    Mouth Sores: no    Shortness of Breath: no    Smoking: no    Fever or Chills: no    Hard Stools: no    Soft Stools: no    Weight Loss: no    Weakness: yes    Burning, numbness or tingling in hands or feet: burning in feet x 6 months    Problems with skin or swelling: no    Memory Loss: no    Anxiety or Depression: no    Trouble Sleeping: no    Kelli Fitzgerald LPN on 10/22/2020 at 11:22 AM

## 2020-10-30 ENCOUNTER — INFUSION THERAPY VISIT (OUTPATIENT)
Dept: INFUSION THERAPY | Facility: CLINIC | Age: 75
End: 2020-10-30
Payer: MEDICARE

## 2020-10-30 VITALS
SYSTOLIC BLOOD PRESSURE: 109 MMHG | OXYGEN SATURATION: 96 % | DIASTOLIC BLOOD PRESSURE: 70 MMHG | HEART RATE: 64 BPM | BODY MASS INDEX: 33.95 KG/M2 | TEMPERATURE: 98.5 F | WEIGHT: 236.6 LBS

## 2020-10-30 DIAGNOSIS — D75.1 ERYTHROCYTOSIS: Primary | ICD-10-CM

## 2020-10-30 DIAGNOSIS — R53.83 OTHER FATIGUE: ICD-10-CM

## 2020-10-30 PROCEDURE — 99195 PHLEBOTOMY: CPT | Performed by: INTERNAL MEDICINE

## 2020-10-30 PROCEDURE — 96360 HYDRATION IV INFUSION INIT: CPT | Performed by: INTERNAL MEDICINE

## 2020-10-30 PROCEDURE — 99207 PR NO CHARGE LOS: CPT

## 2020-10-30 RX ADMIN — Medication 500 ML: at 13:38

## 2020-10-30 ASSESSMENT — PAIN SCALES - GENERAL: PAINLEVEL: NO PAIN (0)

## 2020-10-30 NOTE — PROGRESS NOTES
Infusion Nursing Note:  Martell Coelho presents today for Phlebotomy/IVF.    Patient seen by provider today: No   present during visit today: Not Applicable.    Note: Per Dr HARDY, ok to do phlebotomy with hematocrit of 49.8 drawn on 10/20/20.  600cc blood phlebotomized via R arm without difficulty.  Pt replaced with IV 500cc NS and 500cc water/juice.      Intravenous Access:  Peripheral IV placed.    Treatment Conditions:  Lab Results   Component Value Date    HGB 17.5 09/25/2020     Lab Results   Component Value Date    WBC 6.4 03/04/2020      Lab Results   Component Value Date    ANEU 4.9 08/08/2017     Lab Results   Component Value Date     03/04/2020       Hematocrit  49.8    Results reviewed, labs MET treatment parameters, ok to proceed with treatment.      Post Infusion Assessment:  Patient tolerated infusion without incident.  VSS stable at discharge and pt denies any dizziness or discomfort.  Blood return noted pre and post infusion.  Site patent and intact, free from redness, edema or discomfort.  No evidence of extravasations.  Access discontinued per protocol.       Discharge Plan:   Patient discharged in stable condition accompanied by: self.  Departure Mode: Ambulatory.  Pt will RTC 11/3/20 for lab draw and 11/30/20 for phlebotomy and Dr HARDY appt.    Francisco Barfield RN

## 2020-11-03 DIAGNOSIS — D75.1 ERYTHROCYTOSIS: ICD-10-CM

## 2020-11-03 DIAGNOSIS — R53.83 OTHER FATIGUE: Primary | ICD-10-CM

## 2020-11-03 LAB
HCT VFR BLD AUTO: 45.5 % (ref 40–53)
HGB BLD-MCNC: 15.8 G/DL (ref 13.3–17.7)

## 2020-11-03 PROCEDURE — 85014 HEMATOCRIT: CPT | Performed by: INTERNAL MEDICINE

## 2020-11-03 PROCEDURE — 85018 HEMOGLOBIN: CPT | Performed by: INTERNAL MEDICINE

## 2020-11-03 PROCEDURE — 82306 VITAMIN D 25 HYDROXY: CPT | Performed by: INTERNAL MEDICINE

## 2020-11-04 DIAGNOSIS — R53.83 OTHER FATIGUE: ICD-10-CM

## 2020-11-05 LAB — DEPRECATED CALCIDIOL+CALCIFEROL SERPL-MC: 73 UG/L (ref 20–75)

## 2020-11-25 DIAGNOSIS — D75.1 ERYTHROCYTOSIS: ICD-10-CM

## 2020-11-25 LAB
HCT VFR BLD AUTO: 48.9 % (ref 40–53)
HGB BLD-MCNC: 16.4 G/DL (ref 13.3–17.7)

## 2020-11-25 PROCEDURE — 85014 HEMATOCRIT: CPT | Performed by: INTERNAL MEDICINE

## 2020-11-25 PROCEDURE — 85018 HEMOGLOBIN: CPT | Performed by: INTERNAL MEDICINE

## 2020-12-04 ENCOUNTER — APPOINTMENT (OUTPATIENT)
Dept: URBAN - METROPOLITAN AREA CLINIC 257 | Age: 75
Setting detail: DERMATOLOGY
End: 2020-12-04

## 2020-12-04 DIAGNOSIS — D485 NEOPLASM OF UNCERTAIN BEHAVIOR OF SKIN: ICD-10-CM

## 2020-12-04 DIAGNOSIS — L57.8 OTHER SKIN CHANGES DUE TO CHRONIC EXPOSURE TO NONIONIZING RADIATION: ICD-10-CM

## 2020-12-04 PROBLEM — D48.5 NEOPLASM OF UNCERTAIN BEHAVIOR OF SKIN: Status: ACTIVE | Noted: 2020-12-04

## 2020-12-04 PROCEDURE — OTHER MIPS QUALITY: OTHER

## 2020-12-04 PROCEDURE — OTHER COUNSELING: OTHER

## 2020-12-04 PROCEDURE — OTHER BIOPSY BY SHAVE METHOD: OTHER

## 2020-12-04 PROCEDURE — 11102 TANGNTL BX SKIN SINGLE LES: CPT

## 2020-12-04 PROCEDURE — 99213 OFFICE O/P EST LOW 20 MIN: CPT | Mod: 25

## 2020-12-04 ASSESSMENT — LOCATION ZONE DERM
LOCATION ZONE: FACE
LOCATION ZONE: LEG

## 2020-12-04 ASSESSMENT — LOCATION SIMPLE DESCRIPTION DERM
LOCATION SIMPLE: RIGHT CHEEK
LOCATION SIMPLE: LEFT ACHILLES SKIN

## 2020-12-04 ASSESSMENT — LOCATION DETAILED DESCRIPTION DERM
LOCATION DETAILED: RIGHT CENTRAL MALAR CHEEK
LOCATION DETAILED: LEFT ACHILLES SKIN

## 2020-12-04 NOTE — PROCEDURE: MIPS QUALITY
Quality 111:Pneumonia Vaccination Status For Older Adults: Pneumococcal Vaccination not Administered or Previously Received, Reason not Otherwise Specified
Quality 110: Preventive Care And Screening: Influenza Immunization: Influenza Immunization Ordered or Recommended, but not Administered due to system reason
Quality 130: Documentation Of Current Medications In The Medical Record: Current Medications Documented
Detail Level: Detailed
Quality 431: Preventive Care And Screening: Unhealthy Alcohol Use - Screening: Patient screened for unhealthy alcohol use using a single question and scores less than 2 times per year
Quality 226: Preventive Care And Screening: Tobacco Use: Screening And Cessation Intervention: Patient screened for tobacco use and is an ex/non-smoker

## 2020-12-04 NOTE — PROCEDURE: BIOPSY BY SHAVE METHOD
Hide Accession Number?: No
Cryotherapy Text: The wound bed was treated with cryotherapy after the biopsy was performed.
Was A Bandage Applied: Yes
Dressing: bandage
Biopsy Method: Dermablade
Electrodesiccation Text: The wound bed was treated with electrodesiccation after the biopsy was performed.
Type Of Destruction Used: Curettage
Post-Care Instructions: I reviewed with the patient in detail post-care instructions. Patient is to keep the biopsy site dry overnight, and then apply bacitracin twice daily until healed. Patient may apply hydrogen peroxide soaks to remove any crusting.
Depth Of Biopsy: dermis
Electrodesiccation And Curettage Text: The wound bed was treated with electrodesiccation and curettage after the biopsy was performed.
Anesthesia Type: 1% lidocaine with epinephrine
X Size Of Lesion In Cm: 0
Anesthesia Volume In Cc (Will Not Render If 0): 0.5
Biopsy Type: H and E
Wound Care: Petrolatum
Information: Selecting Yes will display possible errors in your note based on the variables you have selected. This validation is only offered as a suggestion for you. PLEASE NOTE THAT THE VALIDATION TEXT WILL BE REMOVED WHEN YOU FINALIZE YOUR NOTE. IF YOU WANT TO FAX A PRELIMINARY NOTE YOU WILL NEED TO TOGGLE THIS TO 'NO' IF YOU DO NOT WANT IT IN YOUR FAXED NOTE.
Billing Type: Third-Party Bill
Curettage Text: The wound bed was treated with curettage after the biopsy was performed.
Consent: Written consent was obtained and risks were reviewed including but not limited to scarring, infection, bleeding, scabbing, incomplete removal, nerve damage and allergy to anesthesia.
Silver Nitrate Text: The wound bed was treated with silver nitrate after the biopsy was performed.
Notification Instructions: Patient will be notified of biopsy results. However, patient instructed to call the office if not contacted within 2 weeks.
Detail Level: Simple
Hemostasis: Aluminum Chloride

## 2020-12-04 NOTE — HPI: BODY LOCATION - LEG
How Severe Is Your Condition?: mild
Additional History: Comes and goes over the last 5 years. Goes away after about a week. Has used New Skin that has not helped.

## 2020-12-22 ENCOUNTER — OFFICE VISIT (OUTPATIENT)
Dept: NEUROLOGY | Facility: CLINIC | Age: 75
End: 2020-12-22
Attending: INTERNAL MEDICINE
Payer: MEDICARE

## 2020-12-22 VITALS
BODY MASS INDEX: 33.72 KG/M2 | WEIGHT: 235 LBS | OXYGEN SATURATION: 98 % | HEART RATE: 63 BPM | RESPIRATION RATE: 16 BRPM | DIASTOLIC BLOOD PRESSURE: 89 MMHG | SYSTOLIC BLOOD PRESSURE: 131 MMHG

## 2020-12-22 DIAGNOSIS — G62.9 NEUROPATHY: ICD-10-CM

## 2020-12-22 DIAGNOSIS — R63.30 FEEDING DIFFICULTIES: ICD-10-CM

## 2020-12-22 LAB — VIT B12 SERPL-MCNC: 903 PG/ML (ref 193–986)

## 2020-12-22 PROCEDURE — 86255 FLUORESCENT ANTIBODY SCREEN: CPT | Mod: 90 | Performed by: PSYCHIATRY & NEUROLOGY

## 2020-12-22 PROCEDURE — 86803 HEPATITIS C AB TEST: CPT | Performed by: PSYCHIATRY & NEUROLOGY

## 2020-12-22 PROCEDURE — 36415 COLL VENOUS BLD VENIPUNCTURE: CPT | Performed by: PSYCHIATRY & NEUROLOGY

## 2020-12-22 PROCEDURE — 86431 RHEUMATOID FACTOR QUANT: CPT | Performed by: PSYCHIATRY & NEUROLOGY

## 2020-12-22 PROCEDURE — 83520 IMMUNOASSAY QUANT NOS NONAB: CPT | Mod: 90 | Performed by: PSYCHIATRY & NEUROLOGY

## 2020-12-22 PROCEDURE — 83519 RIA NONANTIBODY: CPT | Mod: 90 | Performed by: PSYCHIATRY & NEUROLOGY

## 2020-12-22 PROCEDURE — 99000 SPECIMEN HANDLING OFFICE-LAB: CPT | Performed by: PSYCHIATRY & NEUROLOGY

## 2020-12-22 PROCEDURE — 82784 ASSAY IGA/IGD/IGG/IGM EACH: CPT | Performed by: PSYCHIATRY & NEUROLOGY

## 2020-12-22 PROCEDURE — 84207 ASSAY OF VITAMIN B-6: CPT | Mod: 90 | Performed by: PSYCHIATRY & NEUROLOGY

## 2020-12-22 PROCEDURE — 84425 ASSAY OF VITAMIN B-1: CPT | Mod: 90 | Performed by: PSYCHIATRY & NEUROLOGY

## 2020-12-22 PROCEDURE — 82525 ASSAY OF COPPER: CPT | Mod: 90 | Performed by: PSYCHIATRY & NEUROLOGY

## 2020-12-22 PROCEDURE — 99203 OFFICE O/P NEW LOW 30 MIN: CPT | Performed by: PSYCHIATRY & NEUROLOGY

## 2020-12-22 PROCEDURE — 82607 VITAMIN B-12: CPT | Performed by: PSYCHIATRY & NEUROLOGY

## 2020-12-22 PROCEDURE — 86334 IMMUNOFIX E-PHORESIS SERUM: CPT | Performed by: PATHOLOGY

## 2020-12-22 PROCEDURE — 83921 ORGANIC ACID SINGLE QUANT: CPT | Performed by: PSYCHIATRY & NEUROLOGY

## 2020-12-22 NOTE — PATIENT INSTRUCTIONS
1. Labs today  2. Return visit 3 months. Contact me in the interim if your symptoms worsen or change.

## 2020-12-22 NOTE — LETTER
2020         RE: Martell Coelho  [address]  [facility] [address]        Dear Colleague,    Thank you for referring your patient, Martell Coelho, to the [facility] [place]. Please see a copy of my visit note below.    2020            Rula Mo MD   [address] [address]      Patient:  Martell Coelho   MRN:  [id number]   :  1945      Dear Doctors:      I met with Martell Coelho for evaluation of sensory symptoms in the distal lower limbs today.  Mr. Coelho is a 75-year-old man with a 1-year history of spontaneous onset of burning discomfort over the dorsa of both feet, associated with minimal subjective sensory loss at most.  This has progressed only modestly since onset.  There is no weakness or radicular component.  He denies discoloration.  Symptoms fluctuate, but are not clearly exacerbated by warm or cold environment and are not associated with erythema.      Past history is notable for erythrocytosis, treated with periodic phlebotomy and hematologic monitoring.  As I understand it, he does not have a diagnosis of sol polycythemia vera.  He is otherwise in good health.  He does not drink alcohol, and he is not diabetic.      General physical examination is unremarkable.  In particular, vital signs are normal.  Examination of the feet demonstrates no discoloration or stigmata of vascular insufficiency.  Skin is intact.      Neurological examination is as follows:       Social History     Socioeconomic History     Marital status:      Spouse name: None     Number of children: None     Years of education: None     Highest education level: None   Occupational History     None   Social Needs     Financial resource strain: None     Food insecurity     Worry: None     Inability: None     Transportation needs     Medical: None     Non-medical: None   Tobacco Use     Smoking status: Former  Smoker     Smokeless tobacco: Never Used   Substance and Sexual Activity     Alcohol use: No     Drug use: None     Sexual activity: None   Lifestyle     Physical activity     Days per week: None     Minutes per session: None     Stress: None   Relationships     Social connections     Talks on phone: None     Gets together: None     Attends Anabaptist service: None     Active member of club or organization: None     Attends meetings of clubs or organizations: None     Relationship status: None     Intimate partner violence     Fear of current or ex partner: None     Emotionally abused: None     Physically abused: None     Forced sexual activity: None   Other Topics Concern     None   Social History Narrative     None      Family History   Problem Relation Age of Onset     Cancer Father         Leukemia          Mental state: Alert, appropriate, speech, language, and thought content normal.     Cranial nerves II-XII normal.    Sensory examination:     Right Left   Light touch Normal Normal   Vibration (timed)     Vibration (Rydell-Seiffer) K5 K5   Temp     Pin MF toe   Pos     Legend:   MM = medial malleolus, TT = tibial tuberosity, K = patella, MCP = MCP joint  MF = mid-foot, DC = distal calf, MC = mid calf, PC = proximal calf      Motor examination:     Right Left   Shoulder abduction  5 5   Elbow extension 5 5   Elbow flexion 5 5   Wrist extension  5 5   Finger extension 5 5   FDI 5 5   APB 5 5   Hip flexion 5 5   Knee flexion 5 5   Knee extension 5 5   Dorsiflexion 5 5   Plantar flexion 5 5   A=atrophy    Mild symmetric atrophy of tibial IFM, EDB bulk normal  Mild atrophy left ulnar IHM    Tone normal     Reflexes:   Right Left   Biceps 2 2   BRD 2 2   Triceps 2 2   Veronica 2 2   Patellar 2 2   Achilles 0 0   Plantar Flexor Flexor   Clonus Absent Absent      Coordination:  Finger-nose normal.  Heel-shin normal.  RRMs normal.    Gait:  Normal. Romberg negative.    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  Electrodiagnostic studies are not likely to reveal an alternative condition.  I have extended his laboratory studies somewhat to identify medically important or treatable underlying causes.  I will monitor his condition with a followup visit in 3 months.            Sincerely,      MARTA WASHINGTON MD             D: 2020   T: 2020   MT: THOM      Name:     ADRIAN BELL   MRN:      [id number]        Account:      [id number]   :      1945      Document: [id number]       cc: Javier Mo MD         Again, thank you for allowing me to participate in the care of your patient.        Sincerely,        Marta Washington MD

## 2020-12-22 NOTE — PROGRESS NOTES
[id number]     Review of Systems  No past medical history on file.   Social History     Socioeconomic History     Marital status:      Spouse name: None     Number of children: None     Years of education: None     Highest education level: None   Occupational History     None   Social Needs     Financial resource strain: None     Food insecurity     Worry: None     Inability: None     Transportation needs     Medical: None     Non-medical: None   Tobacco Use     Smoking status: Former Smoker     Smokeless tobacco: Never Used   Substance and Sexual Activity     Alcohol use: No     Drug use: None     Sexual activity: None   Lifestyle     Physical activity     Days per week: None     Minutes per session: None     Stress: None   Relationships     Social connections     Talks on phone: None     Gets together: None     Attends Voodoo service: None     Active member of club or organization: None     Attends meetings of clubs or organizations: None     Relationship status: None     Intimate partner violence     Fear of current or ex partner: None     Emotionally abused: None     Physically abused: None     Forced sexual activity: None   Other Topics Concern     None   Social History Narrative     None      Family History   Problem Relation Age of Onset     Cancer Father         Leukemia          Mental state: Alert, appropriate, speech, language, and thought content normal.     Cranial nerves II-XII normal.    Sensory examination:     Right Left   Light touch Normal Normal   Vibration (timed)     Vibration (Rydell-Seiffer) K5 K5   Temp     Pin MF toe   Pos     Legend:   MM = medial malleolus, TT = tibial tuberosity, K = patella, MCP = MCP joint  MF = mid-foot, DC = distal calf, MC = mid calf, PC = proximal calf      Motor examination:     Right Left   Shoulder abduction  5 5   Elbow extension 5 5   Elbow flexion 5 5   Wrist extension  5 5   Finger extension 5 5   FDI 5 5   APB 5 5   Hip flexion 5 5   Knee flexion 5 5    Knee extension 5 5   Dorsiflexion 5 5   Plantar flexion 5 5   A=atrophy    Mild symmetric atrophy of tibial IFM, EDB bulk normal  Mild atrophy left ulnar IHM    Tone normal     Reflexes:   Right Left   Biceps 2 2   BRD 2 2   Triceps 2 2   Veronica 2 2   Patellar 2 2   Achilles 0 0   Plantar Flexor Flexor   Clonus Absent Absent      Coordination:  Finger-nose normal.  Heel-shin normal.  RRMs normal.    Gait:  Normal. Romberg negative.      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    Recommendations:  SPE, HCV, RF, copper, TTG

## 2020-12-23 LAB
HCV AB SERPL QL IA: NONREACTIVE
IGA SERPL-MCNC: 120 MG/DL (ref 84–499)
IGG SERPL-MCNC: 842 MG/DL (ref 610–1616)
IGM SERPL-MCNC: 90 MG/DL (ref 35–242)
PROT PATTERN SERPL IFE-IMP: NORMAL
RHEUMATOID FACT SER NEPH-ACNC: <7 IU/ML (ref 0–20)

## 2020-12-23 NOTE — PROGRESS NOTES
2020            Rula Mo MD   [address] [address]      Patient:  Martell Coelho   MRN:  [id number]   :  1945      Dear Doctors:      I met with Martell Coelho for evaluation of sensory symptoms in the distal lower limbs today.  Mr. Coelho is a 75-year-old man with a 1-year history of spontaneous onset of burning discomfort over the dorsa of both feet, associated with minimal subjective sensory loss at most.  This has progressed only modestly since onset.  There is no weakness or radicular component.  He denies discoloration.  Symptoms fluctuate, but are not clearly exacerbated by warm or cold environment and are not associated with erythema.      Past history is notable for erythrocytosis, treated with periodic phlebotomy and hematologic monitoring.  As I understand it, he does not have a diagnosis of sol polycythemia vera.  He is otherwise in good health.  He does not drink alcohol, and he is not diabetic.      General physical examination is unremarkable.  In particular, vital signs are normal.  Examination of the feet demonstrates no discoloration or stigmata of vascular insufficiency.  Skin is intact.      Neurological examination is as follows:       Social History     Socioeconomic History     Marital status:      Spouse name: None     Number of children: None     Years of education: None     Highest education level: None   Occupational History     None   Social Needs     Financial resource strain: None     Food insecurity     Worry: None     Inability: None     Transportation needs     Medical: None     Non-medical: None   Tobacco Use     Smoking status: Former Smoker     Smokeless tobacco: Never Used   Substance and Sexual Activity     Alcohol use: No     Drug use: None     Sexual activity: None   Lifestyle     Physical activity     Days per week: None     Minutes per session: None     Stress: None   Relationships      Social connections     Talks on phone: None     Gets together: None     Attends Protestant service: None     Active member of club or organization: None     Attends meetings of clubs or organizations: None     Relationship status: None     Intimate partner violence     Fear of current or ex partner: None     Emotionally abused: None     Physically abused: None     Forced sexual activity: None   Other Topics Concern     None   Social History Narrative     None      Family History   Problem Relation Age of Onset     Cancer Father         Leukemia          Mental state: Alert, appropriate, speech, language, and thought content normal.     Cranial nerves II-XII normal.    Sensory examination:     Right Left   Light touch Normal Normal   Vibration (timed)     Vibration (Rydell-Seiffer) K5 K5   Temp     Pin MF toe   Pos     Legend:   MM = medial malleolus, TT = tibial tuberosity, K = patella, MCP = MCP joint  MF = mid-foot, DC = distal calf, MC = mid calf, PC = proximal calf      Motor examination:     Right Left   Shoulder abduction  5 5   Elbow extension 5 5   Elbow flexion 5 5   Wrist extension  5 5   Finger extension 5 5   FDI 5 5   APB 5 5   Hip flexion 5 5   Knee flexion 5 5   Knee extension 5 5   Dorsiflexion 5 5   Plantar flexion 5 5   A=atrophy    Mild symmetric atrophy of tibial IFM, EDB bulk normal  Mild atrophy left ulnar IHM    Tone normal     Reflexes:   Right Left   Biceps 2 2   BRD 2 2   Triceps 2 2   Veronica 2 2   Patellar 2 2   Achilles 0 0   Plantar Flexor Flexor   Clonus Absent Absent      Coordination:  Finger-nose normal.  Heel-shin normal.  RRMs normal.    Gait:  Normal. Romberg negative.    Mr. 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  Electrodiagnostic studies are not likely to reveal an alternative condition.  I have extended his laboratory studies somewhat to identify medically important or treatable underlying causes.  I will  monitor his condition with a followup visit in 3 months.            Sincerely,      MARTA WASHINGTON MD             D: 2020   T: 2020   MT: THOM      Name:     ADRIAN BELL   MRN:      [id number]        Account:      [id number]   :      1945      Document: [id number]       cc: Jvaier Mo MD

## 2020-12-24 LAB
COPPER SERPL-MCNC: 93.4 UG/DL (ref 70–140)
METHYLMALONATE SERPL-SCNC: 0.11 UMOL/L (ref 0–0.4)
PNP ABY SERUM: NORMAL

## 2020-12-25 LAB
VIT B1 BLD-MCNC: 211 NMOL/L (ref 70–180)
VIT B6 SERPL-MCNC: 71.8 NMOL/L (ref 20–125)

## 2020-12-28 DIAGNOSIS — D75.1 ERYTHROCYTOSIS: ICD-10-CM

## 2020-12-28 LAB
HCT VFR BLD AUTO: 50 % (ref 40–53)
HGB BLD-MCNC: 17.1 G/DL (ref 13.3–17.7)

## 2020-12-28 PROCEDURE — 85018 HEMOGLOBIN: CPT | Performed by: INTERNAL MEDICINE

## 2020-12-28 PROCEDURE — 85014 HEMATOCRIT: CPT | Performed by: INTERNAL MEDICINE

## 2020-12-28 PROCEDURE — 36415 COLL VENOUS BLD VENIPUNCTURE: CPT | Performed by: INTERNAL MEDICINE

## 2020-12-31 ENCOUNTER — INFUSION THERAPY VISIT (OUTPATIENT)
Dept: INFUSION THERAPY | Facility: CLINIC | Age: 75
End: 2020-12-31
Payer: MEDICARE

## 2020-12-31 VITALS
RESPIRATION RATE: 16 BRPM | HEART RATE: 65 BPM | WEIGHT: 222 LBS | BODY MASS INDEX: 31.85 KG/M2 | SYSTOLIC BLOOD PRESSURE: 112 MMHG | TEMPERATURE: 98 F | DIASTOLIC BLOOD PRESSURE: 73 MMHG | OXYGEN SATURATION: 97 %

## 2020-12-31 DIAGNOSIS — D75.1 ERYTHROCYTOSIS: Primary | ICD-10-CM

## 2020-12-31 DIAGNOSIS — R53.83 OTHER FATIGUE: ICD-10-CM

## 2020-12-31 PROCEDURE — 99207 PR NO CHARGE NURSE ONLY: CPT

## 2020-12-31 PROCEDURE — 99195 PHLEBOTOMY: CPT | Performed by: NURSE PRACTITIONER

## 2020-12-31 RX ADMIN — Medication 500 ML: at 13:29

## 2020-12-31 NOTE — PROGRESS NOTES
Infusion Nursing Note:  Martell Coelho presents today for Phelbotomy/IVF.    Patient seen by provider today: No   present during visit today: Not Applicable.    Note: Patient tolerated 614ml of blood removed without incident. Denied dizziness or other issues after. Followed phlebotomy with NS 500ml.    Intravenous Access:  Peripheral IV placed.    Treatment Conditions:  Lab Results   Component Value Date    HGB 17.1 12/28/2020     Lab Results   Component Value Date    WBC 6.4 03/04/2020      Lab Results   Component Value Date    ANEU 4.9 08/08/2017     Lab Results   Component Value Date     03/04/2020      Results reviewed, labs MET treatment parameters, ok to proceed with treatment.  REMOVE 600ml for hct>=50  Today's Hematocrit =50      Post Infusion Assessment:  Patient tolerated infusion without incident.  Blood return noted pre and post infusion.  Site patent and intact, free from redness, edema or discomfort.  Access discontinued per protocol.       Discharge Plan:   Patient will return 1/26/21 for next appointment.   Patient discharged in stable condition accompanied by: self.  Departure Mode: Ambulatory.    Valeria Gastelum RN

## 2021-01-06 VITALS — WEIGHT: 225 LBS | BODY MASS INDEX: 31.5 KG/M2 | HEIGHT: 71 IN

## 2021-01-06 ASSESSMENT — MIFFLIN-ST. JEOR: SCORE: 1787.72

## 2021-01-06 NOTE — PATIENT INSTRUCTIONS
Your BMI is Body mass index is 31.38 kg/m .  Weight management is a personal decision.  If you are interested in exploring weight loss strategies, the following discussion covers the approaches that may be successful. Body mass index (BMI) is one way to tell whether you are at a healthy weight, overweight, or obese. It measures your weight in relation to your height.  A BMI of 18.5 to 24.9 is in the healthy range. A person with a BMI of 25 to 29.9 is considered overweight, and someone with a BMI of 30 or greater is considered obese. More than two-thirds of [place] adults are considered overweight or obese.  Being overweight or obese increases the risk for further weight gain. Excess weight may lead to heart disease and diabetes.  Creating and following plans for healthy eating and physical activity may help you improve your health.  Weight control is part of healthy lifestyle and includes exercise, emotional health, and healthy eating habits. Careful eating habits lifelong are the mainstay of weight control. Though there are significant health benefits from weight loss, long-term weight loss with diet alone may be very difficult to achieve- studies show long-term success with dietary management in less than 10% of people. Attaining a healthy weight may be especially difficult to achieve in those with severe obesity. In some cases, medications, devices and surgical management might be considered.  What can you do?  If you are overweight or obese and are interested in methods for weight loss, you should discuss this with your provider.     Consider reducing daily calorie intake by 500 calories.     Keep a food journal.     Avoiding skipping meals, consider cutting portions instead.    Diet combined with exercise helps maintain muscle while optimizing fat loss. Strength training is particularly important for building and maintaining muscle mass. Exercise helps reduce stress, increase energy, and improves fitness.  Increasing exercise without diet control, however, may not burn enough calories to loose weight.       Start walking three days a week 10-20 minutes at a time    Work towards walking thirty minutes five days a week     Eventually, increase the speed of your walking for 1-2 minutes at time    In addition, we recommend that you review healthy lifestyles and methods for weight loss available through the National Institutes of Health patient information sites:  http://win.niddk.nih.gov/publications/index.htm    And look into health and wellness programs that may be available through your health insurance provider, employer, local community center, or [employer]ura club.            Your Body mass index is 31.38 kg/m .  Weight management is a personal decision.  If you are interested in exploring weight loss strategies, the following discussion covers the approaches that may be successful. Body mass index (BMI) is one way to tell whether you are at a healthy weight, overweight, or obese. It measures your weight in relation to your height.  A BMI of 18.5 to 24.9 is in the healthy range. A person with a BMI of 25 to 29.9 is considered overweight, and someone with a BMI of 30 or greater is considered obese. More than two-thirds of American adults are considered overweight or obese.  Being overweight or obese increases the risk for further weight gain. Excess weight may lead to heart disease and diabetes.  Creating and following plans for healthy eating and physical activity may help you improve your health.  Weight control is part of healthy lifestyle and includes exercise, emotional health, and healthy eating habits. Careful eating habits lifelong are the mainstay of weight control. Though there are significant health benefits from weight loss, long-term weight loss with diet alone may be very difficult to achieve- studies show long-term success with dietary management in less than 10% of people. Attaining a healthy weight may be  especially difficult to achieve in those with severe obesity. In some cases, medications, devices and surgical management might be considered.  What can you do?  If you are overweight or obese and are interested in methods for weight loss, you should discuss this with your provider.     Consider reducing daily calorie intake by 500 calories.     Keep a food journal.     Avoiding skipping meals, consider cutting portions instead.    Diet combined with exercise helps maintain muscle while optimizing fat loss. Strength training is particularly important for building and maintaining muscle mass. Exercise helps reduce stress, increase energy, and improves fitness. Increasing exercise without diet control, however, may not burn enough calories to loose weight.       Start walking three days a week 10-20 minutes at a time    Work towards walking thirty minutes five days a week     Eventually, increase the speed of your walking for 1-2 minutes at time    In addition, we recommend that you review healthy lifestyles and methods for weight loss available through the National Institutes of Health patient information sites:  http://win.niddk.nih.gov/publications/index.htm    And look into health and wellness programs that may be available through your health insurance provider, employer, local community center, or [employer] club.    {Weight Management Plan -- Delete if patient has a normal BMI:[id number]}

## 2021-01-06 NOTE — PROGRESS NOTES
Martell Coelho is a 75 year old male who is being evaluated via a billable video visit.      How would you like to obtain your AVS? MyChart  If the video visit is dropped, the invitation should be resent by: Text to cell phone: [phone]  Will anyone else be joining your video visit? No      Video Start Time: 1:35 PM    Video-Visit Details    Type of service:  Video Visit    Video End Time:1:50 PM    Originating Location (pt. Location): Home    Distant Location (provider location):  [facility] [place]     Platform used for Video Visit: DoreenEmbarkly  Gerri POWER CMA, [facility], 1/6/2021 10:57 AM    Obstructive Sleep Apnea - PAP Follow-Up Visit:    Chief Complaint   Patient presents with     CPAP Follow Up       Martell Coelho comes in today for follow-up of their moderate sleep apnea & sleep related hypoxemia, managed with CPAP & supplemental O2 .     Patient was dinitially diagnosed with sleep apnea in 2013 with an apnea hypopnea index of 22.3 per hour. He had a reevaluation in 2017 after weight loss. PSG on 7/27/2017 at weight of 258 lbs showed an AHI of 7.2 per hour. Baseline O2 saturation was 92.7% and time spent below 88% was 2.2 minutes. Patient was prescribed CPAP after the sleep study.     Overnight oximetries were performed after concern was raised by Hematology about possibility of sleep related hypoxemia contributing to secondary erythrocytosis. There were two night of recording 6/12/20- 6/14/20, duirng which patient said he was using CPAP. He may have removed CPAP towards the end of the night. O2 saturations are above 90% at the beginning and most of the night with drop towards the end. Mean O2 saturation was 92.6%, lowest saturation 81%. Time below 88% was 15 minutes (this seems to be total of 2 nights).     Although hypoxemia is mild, he was started on nocturnal O2 to see if this will benefit erythrocytosis.     Subjectively, patient reports that oxygen therapy has benefited him.  "Sleep quality has improved and overall he feels better.     He has not been tolerating CPAP well and is currently not using CPAP.     He uses nasal mask.     He does feel rested in the morning.    Total score - [place]: 7 (1/6/2021 10:00 AM)    ResMed     Auto-PAP 6.6-11 cmH2O download:  24/30 total days of use. 6 nonuse days. 80% days with >4 hours use.  Average use 6 hours 55 minutes per day. Median Leak - L/min. 95%ile Leak - L/min. CPAP 95% pressure 10.9cm. 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     Patient has not been using CPAP as he complains of mask intolerance. He will try FFM and work with DME for mask optimization. We reviewed importance of regular adherence to therapy and consequences of untreated sleep apnea. Oxygen doesnot fully address repetitive upper airway closure and sympathetic surges caused by sleep apnea.      If he is not able to use CPAP, I advised him to consider oral appliance therapy     Sleep related hypoxemia     Patient reports benefiting from nocturnal oxygen therapy and will continue treatment.     Martell Coelho will follow up in about 6 month(s).     I spent a total of 25 minutes for this appointment today which include time spent before, during and after the " visit for patient care, counseling and coordination of care.    Constantin Woody MD    CC:  Javier Adrian,

## 2021-01-07 ENCOUNTER — TELEPHONE (OUTPATIENT)
Dept: SLEEP MEDICINE | Facility: CLINIC | Age: 76
End: 2021-01-07

## 2021-01-07 ENCOUNTER — VIRTUAL VISIT (OUTPATIENT)
Dept: SLEEP MEDICINE | Facility: CLINIC | Age: 76
End: 2021-01-07
Payer: MEDICARE

## 2021-01-07 DIAGNOSIS — G47.33 OSA (OBSTRUCTIVE SLEEP APNEA): Primary | ICD-10-CM

## 2021-01-07 PROCEDURE — 99214 OFFICE O/P EST MOD 30 MIN: CPT | Mod: 95 | Performed by: INTERNAL MEDICINE

## 2021-01-08 ENCOUNTER — DOCUMENTATION ONLY (OUTPATIENT)
Dept: SLEEP MEDICINE | Facility: CLINIC | Age: 76
End: 2021-01-08

## 2021-01-08 NOTE — PROGRESS NOTES
DATE: 1/8/2021  LOCATION OF MASK FITTING: [place] [facility]  REASON FOR MASK FITTING: LEAKAGE/ DR TOLD PT TO USE FF MASK  DISCUSSED/VIEWED THE FOLLOWING MASKS:    AIRFIT F20  AIRFIT F30I  MAISHA    PT HAS BEEN USING MIRAGE FX. HE STATED HE HAS ISSUES WITH THE PRESSURE DURING THE NIGHT AND TAKES HIS MASK OFF, THEN WILL JUST USE HIS O2. PT LIKED THE F20. IT IS SIMILAR TO HIS MIRAGE FX, BUT SHOULD BE EASIER TO USE.     WENT OVER REORDERING SUPPLIES.    MASK AND SIZE SELECTED: AIRFIT F20 LARGE

## 2021-01-11 NOTE — PROGRESS NOTES
Dental referral, and sleep study report have been mailed to patients home address today 1/11/2021       Gerri POWER CMA, [facility], 1/11/2021 10:01 AM

## 2021-01-15 ENCOUNTER — HEALTH MAINTENANCE LETTER (OUTPATIENT)
Age: 76
End: 2021-01-15

## 2021-01-21 ENCOUNTER — TRANSFERRED RECORDS (OUTPATIENT)
Dept: HEALTH INFORMATION MANAGEMENT | Facility: CLINIC | Age: 76
End: 2021-01-21

## 2021-01-22 DIAGNOSIS — D75.1 ERYTHROCYTOSIS: ICD-10-CM

## 2021-01-22 LAB
HCT VFR BLD AUTO: 47.4 % (ref 40–53)
HGB BLD-MCNC: 15.9 G/DL (ref 13.3–17.7)

## 2021-01-22 PROCEDURE — 85014 HEMATOCRIT: CPT | Performed by: INTERNAL MEDICINE

## 2021-01-22 PROCEDURE — 36415 COLL VENOUS BLD VENIPUNCTURE: CPT | Performed by: INTERNAL MEDICINE

## 2021-01-22 PROCEDURE — 85018 HEMOGLOBIN: CPT | Performed by: INTERNAL MEDICINE

## 2021-01-28 ENCOUNTER — VIRTUAL VISIT (OUTPATIENT)
Dept: ONCOLOGY | Facility: CLINIC | Age: 76
End: 2021-01-28
Payer: MEDICARE

## 2021-01-28 DIAGNOSIS — D75.1 POLYCYTHEMIA SECONDARY TO HYPOXIA: Primary | ICD-10-CM

## 2021-01-28 PROCEDURE — 99214 OFFICE O/P EST MOD 30 MIN: CPT | Mod: 95 | Performed by: INTERNAL MEDICINE

## 2021-01-28 NOTE — PROGRESS NOTES
"Martell Coelho is a 74 year old male who is being evaluated via a billable video visit.      The patient has been notified of following:     \"This video visit will be conducted via a call between you and your physician/provider. We have found that certain health care needs can be provided without the need for an in-person physical exam.  This service lets us provide the care you need with a video conversation.  If a prescription is necessary we can send it directly to your pharmacy.  If lab work is needed we can place an order for that and you can then stop by our lab to have the test done at a later time.    Video visits are billed at different rates depending on your insurance coverage.  Please reach out to your insurance provider with any questions.    If during the course of the call the physician/provider feels a video visit is not appropriate, you will not be charged for this service.\"    Patient has given verbal consent for Video visit? yes  Video-Visit Details    Type of service:  Video Visit    Video visit duration: 20 min  Originating Location (pt. Location):home  Distant Location (provider location):  [facility]     Platform used for Video Visit: Nita Mo MD, MD      Hematology Follow-up visit:  Date on this visit: Jan 28, 2021    Primary Care Physician: Marli Daly.  Sleep medicine: Dr. WILFREDO Woody  Diagnosis:   1.  Erythrocytosis   He transferred his care to me in June 2015, due to Dr. Barber leaving our practice. He originally met with Dr. Barber in 07/2013 when he was evaluated for erythrocytosis. He reports that in 2013 in [facility] he was noted to have Hb ede 18-20 range and underwent three phlebotomies every other day, 500 cc removed each time. These were associated with lightheadedness. When he was seen by Dr. Barber his Hb was normal at 16.2 g/dl, HCT of 46.2. WBC was 5.5 with normal absolute differential counts and platelet count was 182. LFTS were normal, " ferritin was 39. TSH was normal, LDH was normal. PSA was 0.42 and DAVID 2 mutation analysis was negative. At that time he was also diagnosed with MURRAY and his erythrocytosis was felt to be secondary to MURRAY and no phlebotomy was recommended. However, he went back to [facility] and in 2015 he was still having monthly phlebotomies to keep his HCT down although it sounds like HCT was in the mid 40s there. When we saw him in June 2015, his serum erythropoietin level was normal at 27. Hb was 14.1 and ferritin was low at 11. CT abdomen 6/18/2015 showed colonic diverticulosis without inflammatory change and elevation of L hemidiaphragm. Adrenals, spleen and pancreas and liver appeared normal.He has a Hx of CAD, s/p PTCIx2. He is active and exercises daily. In June 2015,  have recommended that he discontinue phlebotomy sessions in [facility] and have his CBCd checked annually. He reports having an EGD in [facility] in May 2016 when after the biopsy he had an UGI bleed. He required repeat EGD and cauterization of bleeding area, per patient.  He was feeling fatigued and his ferritin was low in 2019.  We have tried to give him IV ferric carboxymaltose but his insurance would not approve the infusion and he would incur out-of-pocket costs.  He declined proceeding with IV Injectafer if he had to pay out of pocket.  He did decide to proceed with 2 doses of Venofer in April 2019, 300 mg each.  Afterwards, he went to [facility] and his ferritin was up to 100 per patient in May 2019 and down to 15 June 2019.  Apparently he then had hemoglobin checked in July 2019 in [facility], and he was prescribed 3 courses of phlebotomy with 400 cc were removed each time weekly.  No mutations were identified in the analyzed gene regions of JAK2, CALR,   or MPL genes.  Serum erythropoietin level was normal at 13 on March 4 2020.   Had nocturnal oximetry and outside study from [facility] reviewed. Qualified for medicare group 1: O2 sat at or below 88% for 15 min (over  5 min during sleep qualifies). He has been  seeing Dr. WILFREDO Woody from sleep medicine.   He was sen by Dr. Woody and felt to have  rather mild sleep apnea and hypoxemia and significant erythrocytosis due to this level of hypoxemia seems atypical.  Plan is to continue auto PAP therapy and start supplemental O2 at 2L/min with CPAP   Repeat serum erythropoietin level was normal. CT abdomen pelvis in an outside health system in 2020 showed no evidence of hepatosplenomegaly or any kidney tumors that could be secreting erythropoietin.  JAK2,  MPL and CALR mutation analysis was negative.      History Of Present Illness:  Mr. Coelho is a 75 year old male who presents for f/up  erythrocytosis previously believed to be secondary to sleep apnea and iron deficiency. His daughters lives in the [place].  He is already on ASA 81 mg PO daily.  His ferritin has remained low and unchanged over at least the last 3 years,in 10s to 20s.   He has been on ferrous sulfate supplementation 3 times a week.  He has continued to use CPAP regularly .  However, his hemoglobin and hematocrit have remained in the  high normal to high  range, as below:   Results for ADRIAN COELHO (MRN [id number]) as of 1/28/2021 12:38   Ref. Range 9/25/2020 09:39 11/3/2020 10:15 11/25/2020 09:47 12/28/2020 10:04 1/22/2021 09:55   Hematocrit Latest Ref Range: 40.0 - 53.0 % 52.0 45.5 48.9 50.0 47.4   He feels better when he receives phlebotomy when his hematocrit is greater or equal to 50.  He has 600 cc removed at the time.  His last phlebotomy was  in December 2020, and prior to that on 10/30/2020 and before that on 9/30/2020, and prior to that in 06/2020.     He exercises regularly, daily for over an hour at home.  He was seen by Dr. Merlos for evaluation of  burning in his feet.  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  Serologic work-up has been negative.  Serum vitamin B12 level is high normal.  Sed rate and CRP were  normal.  RF was negative.  In addition, a complete 12 point  review of systems is negative.    Past Medical/Surgical History:  CAD, s/p PTCI  MURRAY  Secondary erythrocytosis  BPH   He has a Hx of chronic pancreatitis in the past. He has had a cholecystectomy in the past.  He has a Hx of diverticulits, recurrent x3 in the past.  He has a Hx of BPH, s/p TURP in [facility] and here has been following up with Dr. Infante.  He had a colonoscopy on 8/3/2017 - which showed Diverticulosis of colon and repeat was recommended in 10 years.     FHx and Soc Hx reviewed   He is a former smoker.    Past Surgical History:   Procedure Laterality Date     APPENDECTOMY       CYSTOSCOPY, TRANSURETHRAL RESECTION (TUR) PROSTATE, COMBINED     Cholecystectomy    Allergies:  Allergies as of 01/28/2021     (No Known Allergies)     Current Medications:  Current Outpatient Medications   Medication Sig Dispense Refill     aspirin 81 MG tablet Take  by mouth daily.       MAGNESIUM OXIDE PO        nebivolol (BYSTOLIC) 2.5 MG tablet Take 2.5 mg by mouth daily       nitroglycerin (NITROLINGUAL) 0.4 MG/SPRAY spray Place 1 spray under the tongue every 5 minutes as needed.       Omega-3 Fatty Acids (OMEGA-3 FISH OIL PO) Take 1 g by mouth daily       omeprazole (PRILOSEC) 20 MG capsule Take 1 capsule by mouth daily.       order for DME Equipment being ordered: Nocturnal Oxygen at 2L/min with CPAP 1 Device 99     order for DME Equipment being ordered: Oxygen    Oxygen at 2L/min with CPAP 1 Device 99     order for DME Equipment being ordered: Nocturnal O2 at 2L/min with CPAP 1 Device 99     pantoprazole (PROTONIX) 40 MG EC tablet Take 40 mg by mouth daily       rosuvastatin (CRESTOR) 20 MG tablet Take 40 mg by mouth daily           Physical Exam:       Wt Readings from Last 5 Encounters:   01/06/21 102.1 kg (225 lb)   12/31/20 100.7 kg (222 lb)   12/22/20 106.6 kg (235 lb)   10/30/20 107.3 kg (236 lb 9.6 oz)   10/22/20 107 kg (236 lb)     Constitutional:  alert and in no distress  Eyes: No redness or discharge  Respiratory: No cough or labored breathing.  Musculoskeletal: Full range of motion in extremities.  Skin: no visible skin lesions or discoloration  Neurological: No tremors and denies headache.  Psychiatric: Mentation appears normal and affect is normal as well.  Alert and oriented x3.  The rest the comprehensive physical examination is deferred due to public [facility] emergency video visit restrictions.    Laboratory/Imaging Studies  Labs reviewed and documented in the EMR.  Outside labs also reviewed.    ASSESSMENT/PLAN:    Mr. 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 He has remained on  CPAP, with correction of his hemoglobin.. Hb is normal but ferritin is low but he reports an UGI bleed in 2016 in [facility], followed by 5 doses of IV iron (Monofer, 100 mg each dose, per patient).   He did respond to IV a total of 600 mg of Venofer which he received in April 2019, but subsequently had phlebotomy in [facility] for reportedly symptomatic erythrocytosis in July 2019 which negated the effect of IV Venofer.     1.  Erythrocytosis-in the past his diagnosis was secondary erythrocytosis associated with sleep apnea and Mariano 2 mutation analysis was negative.  Evaluation for primary erythrocytosis was negative.  However he never ended up proceeding with a bone marrow biopsy.  However, his erythrocytosis persisted despite regular CPAP with oxygen supplementation use.  Continue checking hemoglobin hematocrit monthly and continue phlebotomy for hematocrit equal to or over 50, and remove 600 cc at a time.  Patient feels clinically better following phlebotomy once his hematocrit hits around 50.  Follow-up in 6 months.    2. Iron deficiency -his ferritin is low normal but would not increase supplemental iron supplementation secondary to erythrocytosis.  Continue oral ferrous sulfate supplementation 2-3 times a week in the  interim.    3.  Persistent burning in his feet, somewhat improved per patient -follow-up with Dr. Azevedo in 3 months.  4.  Patient had multiple questions about pros and cons of COVID-19 vaccination and I have recommended in favor of COVID-19 vaccination.  He will schedule and proceed.  At the end of our visit patient verbalized understanding and concurred with the plan.    Addendum:  Per communication with Dr. 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 He is going to [facility] in May and at his visit in July  We'll discuss proceeding with bone marrow biopsy and CT chest. Of note, CT abdomen/pelvis was negative in [facility] in 04/2021.

## 2021-01-28 NOTE — LETTER
"    1/28/2021         RE: Martell Coelho  [address] [facility] [address]        Dear Colleague,    Thank you for referring your patient, Martell Coelho, to the [facility]. Please see a copy of my visit note below.    Martell Coelho is a 74 year old male who is being evaluated via a billable video visit.      The patient has been notified of following:     \"This video visit will be conducted via a call between you and your physician/provider. We have found that certain health care needs can be provided without the need for an in-person physical exam.  This service lets us provide the care you need with a video conversation.  If a prescription is necessary we can send it directly to your pharmacy.  If lab work is needed we can place an order for that and you can then stop by our lab to have the test done at a later time.    Video visits are billed at different rates depending on your insurance coverage.  Please reach out to your insurance provider with any questions.    If during the course of the call the physician/provider feels a video visit is not appropriate, you will not be charged for this service.\"    Patient has given verbal consent for Video visit? yes  Video-Visit Details    Type of service:  Video Visit    Video visit duration: 20 min  Originating Location (pt. Location):home  Distant Location (provider location):  [facility]     Platform used for Video Visit: Nita Mo MD, MD      Hematology Follow-up visit:  Date on this visit: Jan 28, 2021    Primary Care Physician: Marli Daly.  Sleep medicine: Dr. WILFREDO Woody  Diagnosis:   1.  Erythrocytosis   He transferred his care to me in June 2015, due to Dr. Barber leaving our practice. He originally met with Dr. Barber in 07/2013 when he was evaluated for erythrocytosis. He reports that in 2013 in [facility] he was noted to have Hb ede 18-20 range and underwent three phlebotomies every " other day, 500 cc removed each time. These were associated with lightheadedness. When he was seen by Dr. Barber his Hb was normal at 16.2 g/dl, HCT of 46.2. WBC was 5.5 with normal absolute differential counts and platelet count was 182. LFTS were normal, ferritin was 39. TSH was normal, LDH was normal. PSA was 0.42 and DAVID 2 mutation analysis was negative. At that time he was also diagnosed with MURRAY and his erythrocytosis was felt to be secondary to MURRAY and no phlebotomy was recommended. However, he went back to [facility] and in 2015 he was still having monthly phlebotomies to keep his HCT down although it sounds like HCT was in the mid 40s there. When we saw him in June 2015, his serum erythropoietin level was normal at 27. Hb was 14.1 and ferritin was low at 11. CT abdomen 6/18/2015 showed colonic diverticulosis without inflammatory change and elevation of L hemidiaphragm. Adrenals, spleen and pancreas and liver appeared normal.He has a Hx of CAD, s/p PTCIx2. He is active and exercises daily. In June 2015,  have recommended that he discontinue phlebotomy sessions in [facility] and have his CBCd checked annually. He reports having an EGD in [facility] in May 2016 when after the biopsy he had an UGI bleed. He required repeat EGD and cauterization of bleeding area, per patient.  He was feeling fatigued and his ferritin was low in 2019.  We have tried to give him IV ferric carboxymaltose but his insurance would not approve the infusion and he would incur out-of-pocket costs.  He declined proceeding with IV Injectafer if he had to pay out of pocket.  He did decide to proceed with 2 doses of Venofer in April 2019, 300 mg each.  Afterwards, he went to [facility] and his ferritin was up to 100 per patient in May 2019 and down to 15 June 2019.  Apparently he then had hemoglobin checked in July 2019 in [facility], and he was prescribed 3 courses of phlebotomy with 400 cc were removed each time weekly.  No mutations were identified in the  analyzed gene regions of JAK2, CALR,   or MPL genes.  Serum erythropoietin level was normal at 13 on March 4 2020.   Had nocturnal oximetry and outside study from [facility] reviewed. Qualified for medicare group 1: O2 sat at or below 88% for 15 min (over 5 min during sleep qualifies). He has been  seeing Dr. WILFREDO Woody from sleep medicine.   He was sen by Dr. Woody and felt to have  rather mild sleep apnea and hypoxemia and significant erythrocytosis due to this level of hypoxemia seems atypical.  Plan is to continue auto PAP therapy and start supplemental O2 at 2L/min with CPAP   Repeat serum erythropoietin level was normal. CT abdomen pelvis in an outside health system in 2020 showed no evidence of hepatosplenomegaly or any kidney tumors that could be secreting erythropoietin.  JAK2,  MPL and CALR mutation analysis was negative.      History Of Present Illness:  Mr. Coelho is a 75 year old male who presents for f/up  erythrocytosis previously believed to be secondary to sleep apnea and iron deficiency. His daughters lives in the .  He is already on ASA 81 mg PO daily.  His ferritin has remained low and unchanged over at least the last 3 years,in 10s to 20s.   He has been on ferrous sulfate supplementation 3 times a week.  He has continued to use CPAP regularly .  However, his hemoglobin and hematocrit have remained in the  high normal to high  range, as below:   Results for ADRIAN COELHO (MRN [id number]) as of 1/28/2021 12:38   Ref. Range 9/25/2020 09:39 11/3/2020 10:15 11/25/2020 09:47 12/28/2020 10:04 1/22/2021 09:55   Hematocrit Latest Ref Range: 40.0 - 53.0 % 52.0 45.5 48.9 50.0 47.4   He feels better when he receives phlebotomy when his hematocrit is greater or equal to 50.  He has 600 cc removed at the time.  His last phlebotomy was  in December 2020, and prior to that on 10/30/2020 and before that on 9/30/2020, and prior to that in 06/2020.     He exercises regularly, daily for over an hour at home.  He  was seen by Dr. Merlos for evaluation of  burning in his feet.  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  Serologic work-up has been negative.  Serum vitamin B12 level is high normal.  Sed rate and CRP were normal.  RF was negative.  In addition, a complete 12 point  review of systems is negative.    Past Medical/Surgical History:  CAD, s/p PTCI  MURRAY  Secondary erythrocytosis  BPH   He has a Hx of chronic pancreatitis in the past. He has had a cholecystectomy in the past.  He has a Hx of diverticulits, recurrent x3 in the past.  He has a Hx of BPH, s/p TURP in [facility] and here has been following up with Dr. Infante.  He had a colonoscopy on 8/3/2017 - which showed Diverticulosis of colon and repeat was recommended in 10 years.     FHx and Soc Hx reviewed   He is a former smoker.    Past Surgical History:   Procedure Laterality Date     APPENDECTOMY       CYSTOSCOPY, TRANSURETHRAL RESECTION (TUR) PROSTATE, COMBINED     Cholecystectomy    Allergies:  Allergies as of 01/28/2021     (No Known Allergies)     Current Medications:  Current Outpatient Medications   Medication Sig Dispense Refill     aspirin 81 MG tablet Take  by mouth daily.       MAGNESIUM OXIDE PO        nebivolol (BYSTOLIC) 2.5 MG tablet Take 2.5 mg by mouth daily       nitroglycerin (NITROLINGUAL) 0.4 MG/SPRAY spray Place 1 spray under the tongue every 5 minutes as needed.       Omega-3 Fatty Acids (OMEGA-3 FISH OIL PO) Take 1 g by mouth daily       omeprazole (PRILOSEC) 20 MG capsule Take 1 capsule by mouth daily.       order for DME Equipment being ordered: Nocturnal Oxygen at 2L/min with CPAP 1 Device 99     order for DME Equipment being ordered: Oxygen    Oxygen at 2L/min with CPAP 1 Device 99     order for DME Equipment being ordered: Nocturnal O2 at 2L/min with CPAP 1 Device 99     pantoprazole (PROTONIX) 40 MG EC tablet Take 40 mg by mouth daily       rosuvastatin (CRESTOR) 20 MG tablet Take 40  mg by mouth daily           Physical Exam:       Wt Readings from Last 5 Encounters:   01/06/21 102.1 kg (225 lb)   12/31/20 100.7 kg (222 lb)   12/22/20 106.6 kg (235 lb)   10/30/20 107.3 kg (236 lb 9.6 oz)   10/22/20 107 kg (236 lb)     Constitutional: alert and in no distress  Eyes: No redness or discharge  Respiratory: No cough or labored breathing.  Musculoskeletal: Full range of motion in extremities.  Skin: no visible skin lesions or discoloration  Neurological: No tremors and denies headache.  Psychiatric: Mentation appears normal and affect is normal as well.  Alert and oriented x3.  The rest the comprehensive physical examination is deferred due to public health emergency video visit restrictions.    Laboratory/Imaging Studies  Labs reviewed and documented in the EMR.  Outside labs also reviewed.    ASSESSMENT/PLAN:    Mr. 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 He has remained on  CPAP, with correction of his hemoglobin.. Hb is normal but ferritin is low but he reports an UGI bleed in 2016 in [facility], followed by 5 doses of IV iron (Monofer, 100 mg each dose, per patient).   He did respond to IV a total of 600 mg of Venofer which he received in April 2019, but subsequently had phlebotomy in [facility] for reportedly symptomatic erythrocytosis in July 2019 which negated the effect of IV Venofer.     1.  Erythrocytosis-in the past his diagnosis was secondary erythrocytosis associated with sleep apnea and Mariano 2 mutation analysis was negative.  Evaluation for primary erythrocytosis was negative.  However he never ended up proceeding with a bone marrow biopsy.  However, his erythrocytosis persisted despite regular CPAP with oxygen supplementation use.  Continue checking hemoglobin hematocrit monthly and continue phlebotomy for hematocrit equal to or over 50, and remove 600 cc at a time.  Patient feels clinically better following phlebotomy once his  hematocrit hits around 50.  Follow-up in 6 months.    2. Iron deficiency -his ferritin is low normal but would not increase supplemental iron supplementation secondary to erythrocytosis.  Continue oral ferrous sulfate supplementation 2-3 times a week in the interim.    3.  Persistent burning in his feet, somewhat improved per patient -follow-up with Dr. Azevedo in 3 months.  4.  Patient had multiple questions about pros and cons of COVID-19 vaccination and I have recommended in favor of COVID-19 vaccination.  He will schedule and proceed.  At the end of our visit patient verbalized understanding and concurred with the plan.            Again, thank you for allowing me to participate in the care of your patient.        Sincerely,        Rlua Mo MD, MD

## 2021-01-28 NOTE — NURSING NOTE
Martell is a 75 year old who is being evaluated via a billable video visit.      How would you like to obtain your AVS? MyChart  If the video visit is dropped, the invitation should be resent by: Text to cell phone: [phone]  Will anyone else be joining your video visit? No      Video-Visit Details    Type of service:  Video Visit    Originating Location (pt. Location): Home    Distant Location (provider location):  [facility]     Platform used for Video Visit: Jack Gómez [facility]

## 2021-02-26 ENCOUNTER — INFUSION THERAPY VISIT (OUTPATIENT)
Dept: INFUSION THERAPY | Facility: CLINIC | Age: 76
End: 2021-02-26
Payer: MEDICARE

## 2021-02-26 VITALS
TEMPERATURE: 98.2 F | SYSTOLIC BLOOD PRESSURE: 149 MMHG | OXYGEN SATURATION: 95 % | HEART RATE: 69 BPM | WEIGHT: 235.3 LBS | BODY MASS INDEX: 32.82 KG/M2 | DIASTOLIC BLOOD PRESSURE: 89 MMHG

## 2021-02-26 DIAGNOSIS — D75.1 ERYTHROCYTOSIS: ICD-10-CM

## 2021-02-26 DIAGNOSIS — D75.1 ERYTHROCYTOSIS: Primary | ICD-10-CM

## 2021-02-26 LAB
HCT VFR BLD AUTO: 47.4 % (ref 40–53)
HGB BLD-MCNC: 16 G/DL (ref 13.3–17.7)

## 2021-02-26 PROCEDURE — 85018 HEMOGLOBIN: CPT | Performed by: INTERNAL MEDICINE

## 2021-02-26 PROCEDURE — 36415 COLL VENOUS BLD VENIPUNCTURE: CPT | Performed by: INTERNAL MEDICINE

## 2021-02-26 PROCEDURE — 99207 PR NO CHARGE LOS: CPT

## 2021-02-26 PROCEDURE — 85014 HEMATOCRIT: CPT | Performed by: INTERNAL MEDICINE

## 2021-02-26 NOTE — PROGRESS NOTES
Infusion Nursing Note:  Martell Coelho presents today for Phlebotomy, NOT NEEDED.    Patient seen by provider today: No   present during visit today: Not Applicable.    Note: Pt denies any complaints, did not meet parameters for phlebotomy .    Intravenous Access:  No Intravenous access  at this visit.    Treatment Conditions:  Lab Results   Component Value Date    HGB 16.0 02/26/2021     Lab Results   Component Value Date    WBC 6.4 03/04/2020      Lab Results   Component Value Date    ANEU 4.9 08/08/2017     Lab Results   Component Value Date     03/04/2020      Results reviewed, labs did NOT meet treatment parameters: hematocrit = 47.4.      Post Infusion Assessment:  n/a.       Discharge Plan:   Patient discharged in stable condition accompanied by: self.  Departure Mode: Ambulatory.  Pt will RTC 3/23/21 for possible phlebotomy.    Francisco Barfield RN                        
4. Monitor   SAO2   Continuously During Sedation/ Analgesia & Until Patient Meets D/C Criteria. 5. Resuscitation Equipment Available (PRN)   GASTROINTESTINAL  INTERDISCIPLINARY   Goal: Bowel Sounds Maintained   Interventions     1. Evaluate Baseline Bowel Sounds, (PRN), & Prior to Discharge     2. Monitor  Abdominal status of Patient Throughout Procedure     KNOWLEDGE DEFICIT, EDUCATION, DISCHARGE PLAN   INTERDISCIPLINARY    Patient / SO verbalize Understanding Of Procedural discharge Instructions  Interventions     1. Obtain Informed Consent (PRN)      2. Initiate ENDO Plan of Care, Answer Questions (PRN)       3. Assess Patients Ability to Understand Information (PRN)     3. Discharge Planning ; Assure  Presence of   upon Patient Discharge     4. Education / Communication  Ongoing As Appropriate     5. Keep Families Aware of Delays As Appropriate     6. Reinforce Discharge Teaching / Post  Procedure  Instructions (PRN)    PAIN MANAGEMENT  INTERDISCIPLINARY   Goal:Patient Return to Pre Procedure Comfort  Interventions     1. Assess Baseline  Pain Level (PRN)     2. Intra Procedure ;  Evaluation & Assessment Of  Pain  is Ongoing     3. Post procedure; Assess Pain Level Once Awake/ Prior  To Discharge     2. Administer Analgesics as Ordered (PRN)      3. Assess Effectiveness of Pain Management (PRN)       Re-Assess Patient  after all Interventions. Assess Pain Level 30 - 60 Minutes After Pain Management Intervention.      4. Provide Discharge Teaching

## 2021-03-15 ENCOUNTER — TELEPHONE (OUTPATIENT)
Dept: ONCOLOGY | Facility: CLINIC | Age: 76
End: 2021-03-15

## 2021-03-15 DIAGNOSIS — D75.1 ERYTHROCYTOSIS: ICD-10-CM

## 2021-03-15 LAB
HCT VFR BLD AUTO: 50.2 % (ref 40–53)
HGB BLD-MCNC: 16.7 G/DL (ref 13.3–17.7)

## 2021-03-15 PROCEDURE — 36415 COLL VENOUS BLD VENIPUNCTURE: CPT | Performed by: INTERNAL MEDICINE

## 2021-03-15 PROCEDURE — 85018 HEMOGLOBIN: CPT | Performed by: INTERNAL MEDICINE

## 2021-03-15 PROCEDURE — 85014 HEMATOCRIT: CPT | Performed by: INTERNAL MEDICINE

## 2021-03-15 NOTE — TELEPHONE ENCOUNTER
Call placed to patient to communicate Hgb 16.7 and hematocrit 50.2. Patient does meet treatment parameters for phlebotomy. Patient is scheduled on 3/23/21 for phlebotomy. He is asking if he needs to repeat the labs prior. Dr. Mo said patient doesn't need to repeat lab prior to phlebotomy on 3/23/21.

## 2021-03-15 NOTE — TELEPHONE ENCOUNTER
Received call from patient earlier today stating he is having some side effects from his COVID vaccine that are similar to symptoms he has when his Hgb is high. Patient asking if he can come in for a Hgb/Hct check today. Lab appointment scheduled at 1245.

## 2021-03-23 ENCOUNTER — OFFICE VISIT (OUTPATIENT)
Dept: NEUROLOGY | Facility: CLINIC | Age: 76
End: 2021-03-23
Payer: MEDICARE

## 2021-03-23 ENCOUNTER — INFUSION THERAPY VISIT (OUTPATIENT)
Dept: INFUSION THERAPY | Facility: CLINIC | Age: 76
End: 2021-03-23
Payer: MEDICARE

## 2021-03-23 VITALS — HEART RATE: 71 BPM | SYSTOLIC BLOOD PRESSURE: 148 MMHG | RESPIRATION RATE: 16 BRPM | DIASTOLIC BLOOD PRESSURE: 87 MMHG

## 2021-03-23 VITALS
OXYGEN SATURATION: 98 % | HEART RATE: 49 BPM | TEMPERATURE: 97.9 F | DIASTOLIC BLOOD PRESSURE: 84 MMHG | RESPIRATION RATE: 16 BRPM | SYSTOLIC BLOOD PRESSURE: 147 MMHG

## 2021-03-23 DIAGNOSIS — R53.83 OTHER FATIGUE: ICD-10-CM

## 2021-03-23 DIAGNOSIS — D75.1 ERYTHROCYTOSIS: Primary | ICD-10-CM

## 2021-03-23 DIAGNOSIS — G62.9 NEUROPATHY: Primary | ICD-10-CM

## 2021-03-23 PROCEDURE — 99195 PHLEBOTOMY: CPT | Performed by: INTERNAL MEDICINE

## 2021-03-23 PROCEDURE — 99000 SPECIMEN HANDLING OFFICE-LAB: CPT | Performed by: PSYCHIATRY & NEUROLOGY

## 2021-03-23 PROCEDURE — 83519 RIA NONANTIBODY: CPT | Mod: 90 | Performed by: PSYCHIATRY & NEUROLOGY

## 2021-03-23 PROCEDURE — 86255 FLUORESCENT ANTIBODY SCREEN: CPT | Mod: 90 | Performed by: PSYCHIATRY & NEUROLOGY

## 2021-03-23 PROCEDURE — 99214 OFFICE O/P EST MOD 30 MIN: CPT | Performed by: PSYCHIATRY & NEUROLOGY

## 2021-03-23 PROCEDURE — 99207 PR NO CHARGE LOS: CPT

## 2021-03-23 PROCEDURE — 83520 IMMUNOASSAY QUANT NOS NONAB: CPT | Mod: 90 | Performed by: PSYCHIATRY & NEUROLOGY

## 2021-03-23 PROCEDURE — 36415 COLL VENOUS BLD VENIPUNCTURE: CPT | Performed by: PSYCHIATRY & NEUROLOGY

## 2021-03-23 RX ADMIN — Medication 500 ML: at 13:15

## 2021-03-23 ASSESSMENT — PAIN SCALES - GENERAL: PAINLEVEL: NO PAIN (0)

## 2021-03-23 NOTE — Clinical Note
In addition to the EMG that you scheduled, please schedule a 3 month in-person clinic appointment. Thanks.

## 2021-03-23 NOTE — PROGRESS NOTES
Return visit for 75 year old man with idiopathic polyneuropathy. He reports no change in symptoms or function since visit in December.       Mental state: Alert, appropriate, speech, language, and thought content normal.     Cranial nerves II-XII normal.    Sensory examination:     Right Left   Light touch Normal Normal   Vibration (timed)     Vibration (Rydell-Seiffer) 7  K0*   K0   Temp     Pin MF Normal   Pos Normal    Legend:   MM = medial malleolus, TT = tibial tuberosity, K = patella, MCP = MCP joint  MF = mid-foot, DC = distal calf, MC = mid calf, PC = proximal calf  * positive nulls    Motor examination:     Right Left   Shoulder abduction  5 5   Elbow extension 5 5   Elbow flexion 5 5   Wrist extension  5 5   Finger extension 5 5   FDI 5 5   APB 5 5   Hip flexion 5 5   Knee flexion 5 5   Knee extension 5 5   Dorsiflexion 5 5   Plantar flexion 5 5   A=atrophy    Tone normal     Reflexes:   Right Left   Biceps 0 0   BRD 0 2   Triceps 2 2   Patellar 2 2   Achilles 1 0   Plantar Flexor Flexor   Clonus Absent Absent      Coordination:  Finger-nose normal.  Heel-shin normal.  RRMs normal.    Gait:  Normal.    Impression:  Findings of idiopathic polyneuropathy, likely stable, however vibration scores possibly worse. Labs were negative. PNP was not drawn.    Recommendations:  EDx  PNP  RTC 3 months.    Mainor Azevedo M.D.    30 minutes spent on the date of the encounter on chart review, history and examination, documentation and further activities as noted above.

## 2021-03-23 NOTE — LETTER
3/23/2021         RE: Martell Coelho  [address] [facility] [address]        Dear Colleague,    Thank you for referring your patient, Martell Coelho, to the [facility] [place]. Please see a copy of my visit note below.    Return visit for 75 year old man with idiopathic polyneuropathy. He reports no change in symptoms or function since visit in December.       Mental state: Alert, appropriate, speech, language, and thought content normal.     Cranial nerves II-XII normal.    Sensory examination:     Right Left   Light touch Normal Normal   Vibration (timed)     Vibration (Rydell-Seiffer) 7  K0*   K0   Temp     Pin MF Normal   Pos Normal    Legend:   MM = medial malleolus, TT = tibial tuberosity, K = patella, MCP = MCP joint  MF = mid-foot, DC = distal calf, MC = mid calf, PC = proximal calf  * positive nulls    Motor examination:     Right Left   Shoulder abduction  5 5   Elbow extension 5 5   Elbow flexion 5 5   Wrist extension  5 5   Finger extension 5 5   FDI 5 5   APB 5 5   Hip flexion 5 5   Knee flexion 5 5   Knee extension 5 5   Dorsiflexion 5 5   Plantar flexion 5 5   A=atrophy    Tone normal     Reflexes:   Right Left   Biceps 0 0   BRD 0 2   Triceps 2 2   Patellar 2 2   Achilles 1 0   Plantar Flexor Flexor   Clonus Absent Absent      Coordination:  Finger-nose normal.  Heel-shin normal.  RRMs normal.    Gait:  Normal.    Impression:  Findings of idiopathic polyneuropathy, likely stable, however vibration scores possibly worse. Labs were negative. PNP was not drawn.    Recommendations:  EDx  PNP  RTC 3 months.    Mainor Azevedo M.D.    30 minutes spent on the date of the encounter on chart review, history and examination, documentation and further activities as noted above.          Again, thank you for allowing me to participate in the care of your patient.        Sincerely,        Mainor Azevedo MD

## 2021-03-23 NOTE — PROGRESS NOTES
"Prior to scheduled phlebotomy verified patient identity using patient's name and date of birth.  Lab(s) verified: hematocrit 50.2 on 3/15    Peripheral Access:  Accessed: left antecubital with 20ga autoguard instyte catheter without difficulty.  Positive blood return.  No redness, edema or complaints of discomfort.  Pt tolerated phlebotomy, 600 mls of blood removed via gravity using whole blood collection bag and scale per MD's order.  300 mls of oral fluid replaced plus 500 mls IVF.  Pt tolerated procedure without adverse reactions.  Pt denies headache, nausea or discomfort.  IV flushed with 10mls 0.9% Normal Saline after completion of procedure and discontinued per policy with pressure dressing applied.      Pre Vital Signs including O2 saturation documented in \"Vitals\"  Post Vital Signs stable    Reviewed and provided discharge instructions regarding therapeutic phlebotomy.  Pt verbalized understanding.    Reviewed appointments and copy of schedule given to pt.  Return to clinic in: about 3 months.       Ambulation steady.  Pt alert and orientated upon discharge.    "

## 2021-04-02 LAB — PNP ABY SERUM: NORMAL

## 2021-04-15 ENCOUNTER — APPOINTMENT (OUTPATIENT)
Dept: URBAN - METROPOLITAN AREA CLINIC 257 | Age: 76
Setting detail: DERMATOLOGY
End: 2021-04-15

## 2021-04-15 DIAGNOSIS — L82.1 OTHER SEBORRHEIC KERATOSIS: ICD-10-CM

## 2021-04-15 DIAGNOSIS — L29.8 OTHER PRURITUS: ICD-10-CM

## 2021-04-15 PROCEDURE — 99212 OFFICE O/P EST SF 10 MIN: CPT

## 2021-04-15 PROCEDURE — OTHER COUNSELING: OTHER

## 2021-04-15 ASSESSMENT — LOCATION DETAILED DESCRIPTION DERM: LOCATION DETAILED: LEFT ACHILLES SKIN

## 2021-04-15 ASSESSMENT — LOCATION SIMPLE DESCRIPTION DERM: LOCATION SIMPLE: LEFT ACHILLES SKIN

## 2021-04-15 ASSESSMENT — LOCATION ZONE DERM: LOCATION ZONE: LEG

## 2021-04-15 NOTE — PROCEDURE: COUNSELING
Patient Specific Counseling (Will Not Stick From Patient To Patient): Dr. Gasca and I reassured patient that this is a benign lesion. We will have him treat with OTC Eucerin roughness relief spot treatment.
Detail Level: Simple
Detail Level: Detailed

## 2021-04-20 ENCOUNTER — INFUSION THERAPY VISIT (OUTPATIENT)
Dept: INFUSION THERAPY | Facility: CLINIC | Age: 76
End: 2021-04-20
Payer: MEDICARE

## 2021-04-20 DIAGNOSIS — D75.1 POLYCYTHEMIA SECONDARY TO HYPOXIA: Primary | ICD-10-CM

## 2021-04-20 DIAGNOSIS — D75.1 ERYTHROCYTOSIS: ICD-10-CM

## 2021-04-20 LAB
HCT VFR BLD AUTO: 48.3 % (ref 40–53)
HGB BLD-MCNC: 16.2 G/DL (ref 13.3–17.7)

## 2021-04-20 PROCEDURE — 85018 HEMOGLOBIN: CPT | Performed by: INTERNAL MEDICINE

## 2021-04-20 PROCEDURE — 99207 PR NO CHARGE LOS: CPT

## 2021-04-20 PROCEDURE — 85014 HEMATOCRIT: CPT | Performed by: INTERNAL MEDICINE

## 2021-04-20 PROCEDURE — 36415 COLL VENOUS BLD VENIPUNCTURE: CPT | Performed by: INTERNAL MEDICINE

## 2021-04-20 NOTE — PROGRESS NOTES
Infusion Nursing Note:  Martell Coelho presents today for possible therapeutic phlebotomy.  However hematocrit did not meet parameters thus phlebotomy NOT performed.    Patient seen by provider today: No   present during visit today: Not Applicable.    Note: Patient wanted lab results and wanted to leave quickly.  Did not stay for assessment.    Intravenous Access:  No Intravenous access/labs at this visit.    Treatment Conditions:  Hgb 16.2 Hct48.3 which is less than 50, thus phlebotomy not performed.    Discharge Plan:   AVS to patient via MYCHART.  Patient will return 5/18/21 for next appointment.   Patient discharged in stable condition accompanied by: self.  Departure Mode: Ambulatory.    More Wheatley RN

## 2021-05-04 ENCOUNTER — OFFICE VISIT (OUTPATIENT)
Dept: NEUROLOGY | Facility: CLINIC | Age: 76
End: 2021-05-04
Payer: MEDICARE

## 2021-05-04 DIAGNOSIS — G60.8 SENSORY POLYNEUROPATHY: Primary | ICD-10-CM

## 2021-05-04 PROCEDURE — 95912 NRV CNDJ TEST 11-12 STUDIES: CPT | Mod: GC | Performed by: PSYCHIATRY & NEUROLOGY

## 2021-05-04 NOTE — LETTER
5/4/2021       RE: Martell Coelho  [address]  [facility] [address]     Dear Colleague,    Thank you for referring your patient, Martell Coelho, to the [facility] [place] at [facility]. Please see a copy of my visit note below.        [facility]  Electrodiagnostic Laboratory    Nerve Conduction & EMG Report    Patient:       Martell Coelho  Patient ID:    [id number]  Gender:        Male  YOB: 1945  Age:           75 Years 8 Months      Referring provider: Dr. Azevedo    History & Examination:    Mr. Coelho is a 75-year-old gentleman who is presenting with at least a 1 year history of burning pain in the feet bilaterally associated with mild subjective sensory loss.  EMG was requested to characterize the mechanism and severity of suspected polyneuropathy.    Techniques: Sensory and motor conduction studies were done with surface recording electrodes.  Needle EMG was not performed.     Results:    Right median sensory antidromic response to digit II showed mildly decreased amplitude and conduction velocity.  Right ulnar and radial antidromic sensory responses showed mildly decreased amplitudes with normal conduction velocities. Bilateral sural antidromic sensory responses showed reduced amplitudes and normal conduction velocities.  Right superficial peroneal antidromic sensory study showed no response.  Right median, ulnar, bilateral deep peroneal and bilateral tibial motor studies were normal.     Interpretation:    This is an abnormal study.  There is electrophysiologic evidence of a rather mild (given age of 75) length dependent axonal sensory polyneuropathy, with superimposed likely mild right median neuropathy at the wrist.    EMG Physician:    Bong Del Valle MD  Neuromuscular Fellow    ATTENDING ADDENDUM: I was present during the entire study and directly supervised Fellow Dr Del Valle, who performed it. I agree with  the analysis of results and interpretation as above, which I personally reviewed and edited. Saul Fisher MD      Sensory NCS      Nerve / Sites Rec. Site Onset Peak NP Amp Ref. PP Amp Dist Charles Ref. Temp     ms ms  V  V  V cm m/s m/s  C   R MEDIAN - Dig II Anti      Wrist Dig II 3.33 4.38 9.7 10.0 19.2 14 42.0 48.0 32   R ULNAR - Dig V Anti      Wrist Dig V 2.60 3.65 4.9 8.0 8.5 12.5 48.0 48.0 32   R RADIAL - Snuff      Forearm Snuff 2.08 2.55 13.0 15.0 12.7 12.5 60.0 48.0 32   R SURAL - Lat Mall 60      Calf Ankle 3.59 4.17 1.8 5.0 1.5 14 39.0 38.0 31   L SURAL - Lat Mall 60      Calf Ankle 3.13 3.96 3.3 5.0 3.8 14 44.8 38.0 31   R SUP PERONEAL      Lat Leg Patterson NR NR NR  NR 12.5 NR 38.0 31       Motor NCS      Nerve / Sites Rec. Site Lat Ref. Amp Ref. Rel Amp Dist Charles Ref. Dur. Area Temp.     ms ms mV mV % cm m/s m/s ms %  C   R MEDIAN - APB      Wrist APB 4.11 4.40 10.6 5.0 100 8   6.82 100 32      Elbow APB 8.91  9.9  93.1 29 60.5 48.0 6.72 96.9 32   R ULNAR - ADM      Wrist ADM 2.55 3.50 8.4 5.0 100 8   8.07 100 32      B.Elbow ADM 6.72  8.0  94.7 25 60.0 48.0 8.02 98.2 32      A.Elbow ADM 9.22  7.4  88 14 56.0 48.0 8.49 87.7 32   R DEEP PERONEAL - EDB 60      Ankle EDB 4.69 6.00 5.3 2.0 100 8   8.70 100 31      FibHead EDB 12.66  4.1  78.6 33.5 42.0 38.0 9.74 85.1 31      Pop Fos EDB 14.38  3.9  73.9 11 64.0 38.0 10.36 103 31   L DEEP PERONEAL - EDB 60      Ankle EDB 4.58 6.00 6.9 2.0 100 8   8.23 100 31   R TIBIAL - AH      Ankle AH 4.48 6.00 9.3 4.0 100 8   7.76 100 31      Pop Fos AH 15.00  6.4  68.5 44 41.8 38.0 8.59 79.2 31   L TIBIAL - AH      Ankle AH 3.65 6.00 12.1 4.0 100 8   6.93 100 31                               Again, thank you for allowing me to participate in the care of your patient.      Sincerely,    Saul Fisher MD

## 2021-05-04 NOTE — PROGRESS NOTES
[facility]  Electrodiagnostic Laboratory    Nerve Conduction & EMG Report    Patient:       Martell Coelho  Patient ID:    [id number]  Gender:        Male  YOB: 1945  Age:           75 Years 8 Months      Referring provider: Dr. Azevedo    History & Examination:    Mr. Coelho is a 75-year-old gentleman who is presenting with at least a 1 year history of burning pain in the feet bilaterally associated with mild subjective sensory loss.  EMG was requested to characterize the mechanism and severity of suspected polyneuropathy.    Techniques: Sensory and motor conduction studies were done with surface recording electrodes.  Needle EMG was not performed.     Results:    Right median sensory antidromic response to digit II showed mildly decreased amplitude and conduction velocity.  Right ulnar and radial antidromic sensory responses showed mildly decreased amplitudes with normal conduction velocities. Bilateral sural antidromic sensory responses showed reduced amplitudes and normal conduction velocities.  Right superficial peroneal antidromic sensory study showed no response.  Right median, ulnar, bilateral deep peroneal and bilateral tibial motor studies were normal.     Interpretation:    This is an abnormal study.  There is electrophysiologic evidence of a rather mild (given age of 75) length dependent axonal sensory polyneuropathy, with superimposed likely mild right median neuropathy at the wrist.    EMG Physician:    Bong Del Valle MD  Neuromuscular Fellow    ATTENDING ADDENDUM: I was present during the entire study and directly supervised Fellow Dr Del Valle, who performed it. I agree with the analysis of results and interpretation as above, which I personally reviewed and edited. Saul Fisher MD      Sensory NCS      Nerve / Sites Rec. Site Onset Peak NP Amp Ref. PP Amp Dist Charles Ref. Temp     ms ms  V  V  V cm m/s m/s  C   R MEDIAN - Dig II Anti      Wrist Dig II 3.33 4.38 9.7  10.0 19.2 14 42.0 48.0 32   R ULNAR - Dig V Anti      Wrist Dig V 2.60 3.65 4.9 8.0 8.5 12.5 48.0 48.0 32   R RADIAL - Snuff      Forearm Snuff 2.08 2.55 13.0 15.0 12.7 12.5 60.0 48.0 32   R SURAL - Lat Mall 60      Calf Ankle 3.59 4.17 1.8 5.0 1.5 14 39.0 38.0 31   L SURAL - Lat Mall 60      Calf Ankle 3.13 3.96 3.3 5.0 3.8 14 44.8 38.0 31   R SUP PERONEAL      Lat Leg Patterson NR NR NR  NR 12.5 NR 38.0 31       Motor NCS      Nerve / Sites Rec. Site Lat Ref. Amp Ref. Rel Amp Dist Charles Ref. Dur. Area Temp.     ms ms mV mV % cm m/s m/s ms %  C   R MEDIAN - APB      Wrist APB 4.11 4.40 10.6 5.0 100 8   6.82 100 32      Elbow APB 8.91  9.9  93.1 29 60.5 48.0 6.72 96.9 32   R ULNAR - ADM      Wrist ADM 2.55 3.50 8.4 5.0 100 8   8.07 100 32      B.Elbow ADM 6.72  8.0  94.7 25 60.0 48.0 8.02 98.2 32      A.Elbow ADM 9.22  7.4  88 14 56.0 48.0 8.49 87.7 32   R DEEP PERONEAL - EDB 60      Ankle EDB 4.69 6.00 5.3 2.0 100 8   8.70 100 31      FibHead EDB 12.66  4.1  78.6 33.5 42.0 38.0 9.74 85.1 31      Pop Fos EDB 14.38  3.9  73.9 11 64.0 38.0 10.36 103 31   L DEEP PERONEAL - EDB 60      Ankle EDB 4.58 6.00 6.9 2.0 100 8   8.23 100 31   R TIBIAL - AH      Ankle AH 4.48 6.00 9.3 4.0 100 8   7.76 100 31      Pop Fos AH 15.00  6.4  68.5 44 41.8 38.0 8.59 79.2 31   L TIBIAL - AH      Ankle AH 3.65 6.00 12.1 4.0 100 8   6.93 100 31

## 2021-05-10 ENCOUNTER — TELEPHONE (OUTPATIENT)
Dept: NEUROLOGY | Facility: CLINIC | Age: 76
End: 2021-05-10

## 2021-05-10 NOTE — TELEPHONE ENCOUNTER
----- Message from Mainor Azevedo MD sent at 5/9/2021 12:37 PM CDT -----  Patient needs follow up appointment - first available.

## 2021-05-10 NOTE — TELEPHONE ENCOUNTER
I called the patient to schedule a follow up. Next available was in June, but patient is leaving for a [place] trip from May 23rd to September. Spoke with Dr. Azevedo and he gave the ok to schedule patient tomorrow. Patient agreed to 930am visit.

## 2021-05-11 ENCOUNTER — OFFICE VISIT (OUTPATIENT)
Dept: NEUROLOGY | Facility: CLINIC | Age: 76
End: 2021-05-11
Payer: MEDICARE

## 2021-05-11 VITALS
RESPIRATION RATE: 16 BRPM | OXYGEN SATURATION: 97 % | SYSTOLIC BLOOD PRESSURE: 120 MMHG | HEART RATE: 68 BPM | DIASTOLIC BLOOD PRESSURE: 76 MMHG

## 2021-05-11 DIAGNOSIS — G60.8 HEREDITARY SENSORY NEUROPATHY: Primary | ICD-10-CM

## 2021-05-11 DIAGNOSIS — G60.8 SENSORY POLYNEUROPATHY: Primary | ICD-10-CM

## 2021-05-11 LAB
HIV 1+2 AB+HIV1 P24 AG SERPL QL IA: NONREACTIVE
MISCELLANEOUS TEST: NORMAL

## 2021-05-11 PROCEDURE — 86038 ANTINUCLEAR ANTIBODIES: CPT | Performed by: PSYCHIATRY & NEUROLOGY

## 2021-05-11 PROCEDURE — 86256 FLUORESCENT ANTIBODY TITER: CPT | Performed by: PSYCHIATRY & NEUROLOGY

## 2021-05-11 PROCEDURE — 87389 HIV-1 AG W/HIV-1&-2 AB AG IA: CPT | Performed by: PSYCHIATRY & NEUROLOGY

## 2021-05-11 PROCEDURE — 36415 COLL VENOUS BLD VENIPUNCTURE: CPT | Performed by: PSYCHIATRY & NEUROLOGY

## 2021-05-11 PROCEDURE — 86235 NUCLEAR ANTIGEN ANTIBODY: CPT | Performed by: PSYCHIATRY & NEUROLOGY

## 2021-05-11 PROCEDURE — 83516 IMMUNOASSAY NONANTIBODY: CPT | Performed by: PSYCHIATRY & NEUROLOGY

## 2021-05-11 PROCEDURE — 99000 SPECIMEN HANDLING OFFICE-LAB: CPT | Performed by: PSYCHIATRY & NEUROLOGY

## 2021-05-11 PROCEDURE — 99214 OFFICE O/P EST MOD 30 MIN: CPT | Performed by: PSYCHIATRY & NEUROLOGY

## 2021-05-11 NOTE — LETTER
5/11/2021         RE: Martell Coelho  [address] [address]        Dear Colleague,    Thank you for referring your patient, Martell Coelho, to the [facility] [place]. Please see a copy of my visit note below.    Return visit for 75 year old man with sensory neuropathy and polycythemia. EDx personally reviewed today and raise a question of a non-length dependent process in my view. PNP negative. Denies sicca.      Mental state: Alert, appropriate, speech, language, and thought content normal.       Sensory examination:     Right Left   Light touch Normal Normal   Vibration (timed)     Vibration (Rydell-Seiffer) K5 K5   Temp     Pin MF Normal   Pos     Legend:   MM = medial malleolus, TT = tibial tuberosity, K = patella, MCP = MCP joint  MF = mid-foot, DC = distal calf, MC = mid calf, PC = proximal calf      Motor examination:     Right Left   Dorsiflexion 5 5   Plantar flexion 5 5   A=atrophy    Tone normal     Reflexes:   Right Left   Biceps 1 1   Patellar 1 1   Achilles Tr Tr reinf only   Plantar Flexor Flexor   Clonus Absent Absent      Impression:  1. Stable exam  2. Query ganglionopathy b/o EDx    Recommendations:  1. Extend labs  2. I will consult with hematology regarding possible further surveillance for occult malignancy or developing hematologic malignancy despite negative paraneoplastic panel.  3. RTC 4-5 months.    Mainor Azevedo M.D.    Addendum: discussed with hematologist - patient has had CT AP in the [facility] system recently and chest imaging with pulmonologist. She did not feel there is need for bone marrow biopsy.          Again, thank you for allowing me to participate in the care of your patient.        Sincerely,        Mainor Azevedo MD

## 2021-05-12 LAB
ANA SER QL IF: NEGATIVE
ENA SS-A IGG SER IA-ACNC: <0.2 AI (ref 0–0.9)
ENA SS-B IGG SER IA-ACNC: <0.2 AI (ref 0–0.9)
TTG IGA SER-ACNC: <1 U/ML
TTG IGG SER-ACNC: <1 U/ML

## 2021-05-18 ENCOUNTER — INFUSION THERAPY VISIT (OUTPATIENT)
Dept: INFUSION THERAPY | Facility: CLINIC | Age: 76
End: 2021-05-18
Payer: MEDICARE

## 2021-05-18 VITALS
HEART RATE: 74 BPM | RESPIRATION RATE: 18 BRPM | WEIGHT: 234.5 LBS | BODY MASS INDEX: 32.71 KG/M2 | SYSTOLIC BLOOD PRESSURE: 106 MMHG | TEMPERATURE: 98 F | OXYGEN SATURATION: 96 % | DIASTOLIC BLOOD PRESSURE: 70 MMHG

## 2021-05-18 DIAGNOSIS — D75.1 ERYTHROCYTOSIS: ICD-10-CM

## 2021-05-18 DIAGNOSIS — R53.83 OTHER FATIGUE: ICD-10-CM

## 2021-05-18 DIAGNOSIS — D75.1 ERYTHROCYTOSIS: Primary | ICD-10-CM

## 2021-05-18 LAB
HCT VFR BLD AUTO: 49.3 % (ref 40–53)
HGB BLD-MCNC: 16.4 G/DL (ref 13.3–17.7)

## 2021-05-18 PROCEDURE — 85018 HEMOGLOBIN: CPT | Performed by: INTERNAL MEDICINE

## 2021-05-18 PROCEDURE — 96360 HYDRATION IV INFUSION INIT: CPT | Performed by: NURSE PRACTITIONER

## 2021-05-18 PROCEDURE — 99195 PHLEBOTOMY: CPT | Performed by: NURSE PRACTITIONER

## 2021-05-18 PROCEDURE — 99207 PR NO CHARGE LOS: CPT

## 2021-05-18 PROCEDURE — 85014 HEMATOCRIT: CPT | Performed by: INTERNAL MEDICINE

## 2021-05-18 PROCEDURE — 36415 COLL VENOUS BLD VENIPUNCTURE: CPT | Performed by: INTERNAL MEDICINE

## 2021-05-18 RX ADMIN — Medication 500 ML: at 14:07

## 2021-05-18 ASSESSMENT — PAIN SCALES - GENERAL: PAINLEVEL: NO PAIN (0)

## 2021-05-18 NOTE — PROGRESS NOTES
Infusion Nursing Note:  Martell Coelho presents today for phlebotomy and IVF's   Patient seen by provider today: No   present during visit today: Not Applicable.    Note: Pt with HCT of 49.3 - orders read to take 600 ml if hematocrit is above 50 - Pt requesting to have his phlebotomy today because he will be traveling to [facility] for 5-6 months and he won't be able to have another treatment for awhile.  Dr Mo notifed - MD lucas with 600 ml phlebotomy today.    Patient did meet criteria for an asymptomatic covid-19 PCR test in infusion today. Patient declined the covid-19 test. COVID 02/18/2021 and 03/11/2021    Intravenous Access:  Peripheral IV placed.    Treatment Conditions:  Lab Results   Component Value Date    HGB 16.4 05/18/2021     Lab Results   Component Value Date    WBC 6.4 03/04/2020      Lab Results   Component Value Date    ANEU 4.9 08/08/2017     Lab Results   Component Value Date     03/04/2020      Results reviewed, labs did not MET treatment parameters, ok to proceed with treatment per Dr Gagnon because patient will be going to [facility] for 5-6 months.      Post Infusion Assessment:  Patient tolerated infusion without incident.  Blood return noted pre and post infusion.  Site patent and intact, free from redness, edema or discomfort.  No evidence of extravasations.  Access discontinued per protocol.       Discharge Plan:   Pt will call to cancel appointments - if his travel plans are cancelled he will be here on 06/22/2021  Discharge instructions reviewed with: Patient.  Patient and/or family verbalized understanding of discharge instructions and all questions answered.  Patient discharged in stable condition accompanied by: self.  Departure Mode: Ambulatory.    Adore Jackson, RN

## 2021-06-03 LAB — LAB SCANNED RESULT: NORMAL

## 2021-06-09 LAB — LAB SCANNED RESULT: NORMAL

## 2021-06-23 ENCOUNTER — MEDICAL CORRESPONDENCE (OUTPATIENT)
Dept: HEALTH INFORMATION MANAGEMENT | Facility: CLINIC | Age: 76
End: 2021-06-23

## 2021-09-04 ENCOUNTER — HEALTH MAINTENANCE LETTER (OUTPATIENT)
Age: 76
End: 2021-09-04

## 2022-01-25 DIAGNOSIS — G47.33 OBSTRUCTIVE SLEEP APNEA (ADULT) (PEDIATRIC): Primary | ICD-10-CM

## 2022-02-19 ENCOUNTER — HEALTH MAINTENANCE LETTER (OUTPATIENT)
Age: 77
End: 2022-02-19

## 2022-05-20 ENCOUNTER — DOCUMENTATION ONLY (OUTPATIENT)
Dept: SLEEP MEDICINE | Facility: CLINIC | Age: 77
End: 2022-05-20
Payer: COMMERCIAL

## 2022-05-20 DIAGNOSIS — G47.34 IDIOPATHIC SLEEP RELATED NONOBSTRUCTIVE ALVEOLAR HYPOVENTILATION: Primary | ICD-10-CM

## 2022-05-20 DIAGNOSIS — D75.1 POLYCYTHEMIA, SECONDARY: ICD-10-CM

## 2022-05-31 ENCOUNTER — TELEPHONE (OUTPATIENT)
Dept: SCHEDULING | Facility: CLINIC | Age: 77
End: 2022-05-31
Payer: COMMERCIAL

## 2022-05-31 NOTE — TELEPHONE ENCOUNTER
Reason for call:  Other   Patient called regarding (reason for call): appointment  Additional comments: Pt needs a return visit to renew prescription on sleep equipment. PT is currently in the  [place], and won't be back to [place] until middle of August. He would prefer to do a telephone call, not video. Please contact patient to schedule. Unsure if Bong Killian can provider care to pt while not in [place]. PT states you can also send a [employer] message.     Phone number to reach patient:  Cell number on file:    Telephone Information:   Mobile [phone]       Best Time:  Anytime after 8 AM: There is a time difference where pt is at.     Can we leave a detailed message on this number?  YES    Travel screening: Not Applicable

## 2022-10-22 ENCOUNTER — HEALTH MAINTENANCE LETTER (OUTPATIENT)
Age: 77
End: 2022-10-22

## 2023-04-01 ENCOUNTER — HEALTH MAINTENANCE LETTER (OUTPATIENT)
Age: 78
End: 2023-04-01

## 2024-02-22 ENCOUNTER — TRANSCRIBE ORDERS (OUTPATIENT)
Dept: OTHER | Age: 79
End: 2024-02-22

## 2024-02-22 ENCOUNTER — PATIENT OUTREACH (OUTPATIENT)
Dept: ONCOLOGY | Facility: CLINIC | Age: 79
End: 2024-02-22
Payer: COMMERCIAL

## 2024-02-22 DIAGNOSIS — D75.1 POLYCYTHEMIA SECONDARY TO HYPOXIA: Primary | ICD-10-CM

## 2024-02-22 DIAGNOSIS — G47.33 OBSTRUCTIVE SLEEP APNEA (ADULT) (PEDIATRIC): Primary | ICD-10-CM

## 2024-02-22 NOTE — PROGRESS NOTES
Referral received for secondary polycythemia for former patient of Dr Mo at [place], who is now not with our service line. Patient requesting to re-establish, will update referral for first available at location of choice.

## 2024-02-26 ENCOUNTER — APPOINTMENT (OUTPATIENT)
Dept: URBAN - METROPOLITAN AREA CLINIC 257 | Age: 79
Setting detail: DERMATOLOGY
End: 2024-02-26

## 2024-02-26 DIAGNOSIS — D22 MELANOCYTIC NEVI: ICD-10-CM

## 2024-02-26 DIAGNOSIS — Z71.89 OTHER SPECIFIED COUNSELING: ICD-10-CM

## 2024-02-26 DIAGNOSIS — D49.2 NEOPLASM OF UNSPECIFIED BEHAVIOR OF BONE, SOFT TISSUE, AND SKIN: ICD-10-CM

## 2024-02-26 DIAGNOSIS — L82.1 OTHER SEBORRHEIC KERATOSIS: ICD-10-CM

## 2024-02-26 DIAGNOSIS — L81.4 OTHER MELANIN HYPERPIGMENTATION: ICD-10-CM

## 2024-02-26 DIAGNOSIS — L57.8 OTHER SKIN CHANGES DUE TO CHRONIC EXPOSURE TO NONIONIZING RADIATION: ICD-10-CM

## 2024-02-26 DIAGNOSIS — Z85.828 PERSONAL HISTORY OF OTHER MALIGNANT NEOPLASM OF SKIN: ICD-10-CM

## 2024-02-26 DIAGNOSIS — L57.0 ACTINIC KERATOSIS: ICD-10-CM

## 2024-02-26 DIAGNOSIS — D18.0 HEMANGIOMA: ICD-10-CM

## 2024-02-26 PROBLEM — D22.5 MELANOCYTIC NEVI OF TRUNK: Status: ACTIVE | Noted: 2024-02-26

## 2024-02-26 PROBLEM — D18.01 HEMANGIOMA OF SKIN AND SUBCUTANEOUS TISSUE: Status: ACTIVE | Noted: 2024-02-26

## 2024-02-26 PROCEDURE — OTHER COUNSELING: OTHER

## 2024-02-26 PROCEDURE — OTHER MIPS QUALITY: OTHER

## 2024-02-26 PROCEDURE — 17003 DESTRUCT PREMALG LES 2-14: CPT

## 2024-02-26 PROCEDURE — OTHER LIQUID NITROGEN: OTHER

## 2024-02-26 PROCEDURE — 17000 DESTRUCT PREMALG LESION: CPT | Mod: 59

## 2024-02-26 PROCEDURE — 11102 TANGNTL BX SKIN SINGLE LES: CPT

## 2024-02-26 PROCEDURE — OTHER BIOPSY BY SHAVE METHOD: OTHER

## 2024-02-26 PROCEDURE — 99213 OFFICE O/P EST LOW 20 MIN: CPT | Mod: 25

## 2024-02-26 ASSESSMENT — LOCATION DETAILED DESCRIPTION DERM
LOCATION DETAILED: LEFT SUPERIOR MEDIAL UPPER BACK
LOCATION DETAILED: RIGHT SUPERIOR FOREHEAD
LOCATION DETAILED: LEFT MEDIAL UPPER BACK
LOCATION DETAILED: LEFT SUPERIOR FOREHEAD
LOCATION DETAILED: LEFT MEDIAL FRONTAL SCALP
LOCATION DETAILED: LEFT LATERAL FOREHEAD
LOCATION DETAILED: RIGHT ANTERIOR SHOULDER
LOCATION DETAILED: RIGHT POSTERIOR SHOULDER
LOCATION DETAILED: RIGHT MEDIAL FRONTAL SCALP
LOCATION DETAILED: RIGHT LATERAL FOREHEAD

## 2024-02-26 ASSESSMENT — LOCATION SIMPLE DESCRIPTION DERM
LOCATION SIMPLE: RIGHT SHOULDER
LOCATION SIMPLE: LEFT SCALP
LOCATION SIMPLE: LEFT FOREHEAD
LOCATION SIMPLE: RIGHT SCALP
LOCATION SIMPLE: LEFT UPPER BACK
LOCATION SIMPLE: RIGHT FOREHEAD

## 2024-02-26 ASSESSMENT — LOCATION ZONE DERM
LOCATION ZONE: FACE
LOCATION ZONE: TRUNK
LOCATION ZONE: ARM
LOCATION ZONE: SCALP

## 2024-02-26 NOTE — PROCEDURE: BIOPSY BY SHAVE METHOD

## 2024-02-28 NOTE — TELEPHONE ENCOUNTER
RECORDS STATUS - ALL OTHER DIAGNOSIS      RECORDS RECEIVED FROM: Epic   DATE RECEIVED:    NOTES STATUS DETAILS   OFFICE NOTE from referring provider SELF    OFFICE NOTE from medical oncologist Epic 01/28/21: Dr. Rula Mo   MEDICATION LIST [facility]    LABS     PATHOLOGY REPORTS Report in Epic 07/31/13:[id number]   ANYTHING RELATED TO DIAGNOSIS Epic Most recent 04/01/24

## 2024-03-06 ENCOUNTER — TRANSCRIBE ORDERS (OUTPATIENT)
Dept: OTHER | Age: 79
End: 2024-03-06

## 2024-03-06 DIAGNOSIS — G60.8 PERIPHERAL SENSORY NEUROPATHY: Primary | ICD-10-CM

## 2024-03-11 ENCOUNTER — APPOINTMENT (OUTPATIENT)
Dept: URBAN - METROPOLITAN AREA CLINIC 257 | Age: 79
Setting detail: DERMATOLOGY
End: 2024-03-11

## 2024-03-11 PROBLEM — C44.622 SQUAMOUS CELL CARCINOMA OF SKIN OF RIGHT UPPER LIMB, INCLUDING SHOULDER: Status: ACTIVE | Noted: 2024-03-11

## 2024-03-11 PROCEDURE — OTHER MIPS QUALITY: OTHER

## 2024-03-11 PROCEDURE — 17262 DSTRJ MAL LES T/A/L 1.1-2.0: CPT

## 2024-03-11 PROCEDURE — OTHER CURETTAGE AND DESTRUCTION: OTHER

## 2024-03-11 PROCEDURE — OTHER COUNSELING: OTHER

## 2024-05-01 ENCOUNTER — PRE VISIT (OUTPATIENT)
Dept: ONCOLOGY | Facility: CLINIC | Age: 79
End: 2024-05-01
Payer: COMMERCIAL

## 2024-05-01 ENCOUNTER — ONCOLOGY VISIT (OUTPATIENT)
Dept: ONCOLOGY | Facility: CLINIC | Age: 79
End: 2024-05-01
Attending: INTERNAL MEDICINE
Payer: COMMERCIAL

## 2024-05-01 VITALS
DIASTOLIC BLOOD PRESSURE: 81 MMHG | HEART RATE: 68 BPM | SYSTOLIC BLOOD PRESSURE: 129 MMHG | RESPIRATION RATE: 16 BRPM | BODY MASS INDEX: 33.47 KG/M2 | WEIGHT: 233.8 LBS | OXYGEN SATURATION: 96 % | HEIGHT: 70 IN

## 2024-05-01 DIAGNOSIS — D75.0 CONGENITAL POLYCYTHEMIA: ICD-10-CM

## 2024-05-01 DIAGNOSIS — D75.1 POLYCYTHEMIA SECONDARY TO HYPOXIA: Primary | ICD-10-CM

## 2024-05-01 DIAGNOSIS — J98.4 OTHER DISORDERS OF LUNG: ICD-10-CM

## 2024-05-01 PROCEDURE — 81479 UNLISTED MOLECULAR PATHOLOGY: CPT | Performed by: INTERNAL MEDICINE

## 2024-05-01 PROCEDURE — 99204 OFFICE O/P NEW MOD 45 MIN: CPT | Performed by: INTERNAL MEDICINE

## 2024-05-01 PROCEDURE — 83020 HEMOGLOBIN ELECTROPHORESIS: CPT | Performed by: INTERNAL MEDICINE

## 2024-05-01 PROCEDURE — 83021 HEMOGLOBIN CHROMOTOGRAPHY: CPT | Performed by: INTERNAL MEDICINE

## 2024-05-01 PROCEDURE — G0463 HOSPITAL OUTPT CLINIC VISIT: HCPCS | Performed by: INTERNAL MEDICINE

## 2024-05-01 PROCEDURE — 36415 COLL VENOUS BLD VENIPUNCTURE: CPT | Performed by: INTERNAL MEDICINE

## 2024-05-01 ASSESSMENT — PAIN SCALES - GENERAL: PAINLEVEL: NO PAIN (0)

## 2024-05-01 NOTE — PROGRESS NOTES
[facility] Hematology / Oncology  Initial Visit / Consultation Note May 1, 2024  Name: Martell Coelho  :  1945  MRN:  [id number]    --------------------    Assessment / Plan:  Polycytchemia, mild.  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  No improvement w/ sleep apnea.    Martell and I reviewed that I suspect he may suffer from congenital polycythemia given the longstanding nature of his hemoglobin elevation in the negative myeloproliferative neoplasm testing.  We do have the option of performing congenital polycythemia testing at the [facility] lab in a stepwise, algorithmic fashion; we will draw his blood today but I have asked Martell to return with his insurance company if the testing is covered.  I would recommend continued phlebotomies to try to keep his hemoglobin around 17 or hematocrit of 51; I do not think that he needs a normal saline bolus as this is likely just a transient dilutional effect and he could achieve the same through oral hydration.  For now, Martell will continue to follow with his primary care provider and have monthly phlebotomy through his primary care clinic within the [facility] system which I support.  We reviewed that the biggest product of frequent phlebotomy sessions are the risk of iron deficiency anemia which may end up leaving him with more fatigue than the complications of mild to modest polycythemia.  More importantly than I think, I would recommend that he remain on Aspirin 81 mg daily indefinitely provided he does not have any GI bleeding episodes.  For now, I would continue to recommend holding on a bone marrow biopsy in light of his negative MPN genetic testing.    Thank you kindly for this consultation.  Migel Shaw MD    --------------------    Interval History:  Martell present for evaluation of polycythemia unaccompanied.  He has been seen by several of my colleagues over the last several years.  Prior testing for his polycythemia revealed negative MPN, JARAD reticulin, MPL  "genetic testing from the peripheral blood.  He has been receiving monthly phlebotomies to target a hemoglobin of around 15.  In the past, it was felt that he had potentially sleep apnea given some snoring history.  He has been compliant with CPAP/BiPAP and his hemoglobin has not improved much.  He denies any stroke or cardiac complaints.  He is from [place].  He is not aware of any [facility] or [place] heritage.  He is not aware of any other secondary causes such as heart or lung disease.  Phlebotomies go well.  He does receive some saline without issue.    --------------------    Review of Systems:  10 point ROS negative except for that above.    Past Medical / Surgical History:  No past medical history on file.  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    --------------------    Physical Exam:  VS: /81 (BP Location: Left arm)   Pulse 68   Resp 16   Ht 1.778 m (5' 10\")   Wt 106.1 kg (233 lb 12.8 oz)   SpO2 96%   BMI 33.55 kg/m    GEN: Well appearing.    Labs / Imaging / Path:  Reviewed CBC, MPN testing, sleep study, ferritin.  "

## 2024-05-01 NOTE — LETTER
2024         RE: Martell Coelho  [address] [address]        Dear Colleague,    Thank you for referring your patient, Martell Coelho, to the [facility] [place]. Please see a copy of my visit note below.    [facility] Hematology / Oncology  Initial Visit / Consultation Note May 1, 2024  Name: Martell Coelho  :  1945  MRN:  [id number]    --------------------    Assessment / Plan:  Polycytchemia, mild.  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  No improvement w/ sleep apnea.    Martell and I reviewed that I suspect he may suffer from congenital polycythemia given the longstanding nature of his hemoglobin elevation in the negative myeloproliferative neoplasm testing.  We do have the option of performing congenital polycythemia testing at the [facility] lab in a stepwise, algorithmic fashion; we will draw his blood today but I have asked Martell to return with his insurance company if the testing is covered.  I would recommend continued phlebotomies to try to keep his hemoglobin around 17 or hematocrit of 51; I do not think that he needs a normal saline bolus as this is likely just a transient dilutional effect and he could achieve the same through oral hydration.  For now, Martell will continue to follow with his primary care provider and have monthly phlebotomy through his primary care clinic within the [facility] system which I support.  We reviewed that the biggest product of frequent phlebotomy sessions are the risk of iron deficiency anemia which may end up leaving him with more fatigue than the complications of mild to modest polycythemia.  More importantly than I think, I would recommend that he remain on Aspirin 81 mg daily indefinitely provided he does not have any GI bleeding episodes.  For now, I would continue to recommend holding on a bone marrow biopsy in light of his negative MPN genetic testing.    Thank you kindly for this consultation.  Migel Shaw,  "MD    --------------------    Interval History:  Martell present for evaluation of polycythemia unaccompanied.  He has been seen by several of my colleagues over the last several years.  Prior testing for his polycythemia revealed negative MPN, JARAD reticulin, MPL genetic testing from the peripheral blood.  He has been receiving monthly phlebotomies to target a hemoglobin of around 15.  In the past, it was felt that he had potentially sleep apnea given some snoring history.  He has been compliant with CPAP/BiPAP and his hemoglobin has not improved much.  He denies any stroke or cardiac complaints.  He is from [place].  He is not aware of any [facility] or [place] heritage.  He is not aware of any other secondary causes such as heart or lung disease.  Phlebotomies go well.  He does receive some saline without issue.    --------------------    Review of Systems:  10 point ROS negative except for that above.    Past Medical / Surgical History:  No past medical history on file.  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    --------------------    Physical Exam:  VS: /81 (BP Location: Left arm)   Pulse 68   Resp 16   Ht 1.778 m (5' 10\")   Wt 106.1 kg (233 lb 12.8 oz)   SpO2 96%   BMI 33.55 kg/m    GEN: Well appearing.    Labs / Imaging / " Path:  Reviewed CBC, MPN testing, sleep study, ferritin.      Again, thank you for allowing me to participate in the care of your patient.        Sincerely,        Migel Shaw MD

## 2024-05-01 NOTE — NURSING NOTE
"Oncology Rooming Note    May 1, 2024 12:53 PM   Martell Coelho is a 78 year old male who presents for:    Chief Complaint   Patient presents with    Oncology Clinic Visit     Initial Vitals: /81 (BP Location: Left arm)   Pulse 68   Resp 16   Ht 1.778 m (5' 10\")   Wt 106.1 kg (233 lb 12.8 oz)   SpO2 96%   BMI 33.55 kg/m   Estimated body mass index is 33.55 kg/m  as calculated from the following:    Height as of this encounter: 1.778 m (5' 10\").    Weight as of this encounter: 106.1 kg (233 lb 12.8 oz). Body surface area is 2.29 meters squared.  No Pain (0) Comment: Data Unavailable   No LMP for male patient.  Allergies reviewed: Yes  Medications reviewed: Yes    Medications: Medication refills not needed today.  Pharmacy name entered into [employer]:    [id number] [place], [place] - [address] [place] [address]    Frailty Screening:   Is the patient here for a new oncology consult visit in cancer care? 2. No      Clinical concerns: Patient will discuss concerns with provider.       Ewa Sanders MA            "

## 2024-05-03 ENCOUNTER — MYC MEDICAL ADVICE (OUTPATIENT)
Dept: ONCOLOGY | Facility: CLINIC | Age: 79
End: 2024-05-03
Payer: COMMERCIAL

## 2024-05-03 LAB
Lab: NORMAL
PERFORMING LABORATORY: NORMAL
SPECIMEN STATUS: NORMAL
TEST NAME: NORMAL

## 2024-05-03 NOTE — TELEPHONE ENCOUNTER
Patient returned call, states he spoke with his insurance and provided all of the CPT codes.   informed the patient that he has a $$0 copay for all labs, including [facility].  Patient states ok to send the specimen.  Reviewed with patient that the [facility] test menu states a report could be expected in 3-25 days and he would receive a call with results or a request to schedule a visit with Dr. Shaw to discuss.    Patient verbalizes understanding and agrees.    Lab notified ok to send specimen for testing.      Nelli Gracia RN

## 2024-05-03 NOTE — TELEPHONE ENCOUNTER
Received a message from the lab that they were instructed to wait on sending a lab test ordered by Dr. Shaw until the patient determined insurance coverage.  Test is Erythrocytosis Evaluation, blood performed at [facility].  Today is the last day that the sample will be stable.  If not sent today a new sample would need to be drawn.    Attempted to reach patient, message left requesting return call.  [employer] message also sent to patient.  Lab provided the [facility] Fees and Codes website, provided the following in the [employer] message for patient to provide to insurance company:    CPT CODE INFORMATION   07196-93    [id number]    [id number]    [id number]    [id number] (if appropriate)    [id number] (if appropriate)    [id number] (if appropriate)      Will await return call or message.      Nelli Gracia RN

## 2024-05-03 NOTE — TELEPHONE ENCOUNTER
Patient returned call, provided name of test and CPT codes listed.  He will contact his insurance company and return call.   States he has attempted to reach them, but is not easy to reach them.      Patient is aware that new sample will be required if not able to send today.        Nelli Gracia RN

## 2024-05-17 LAB
MAYO MISC RESULT: NORMAL
SCANNED LAB RESULT: NORMAL
TEST NAME: NORMAL

## 2024-05-23 ENCOUNTER — APPOINTMENT (OUTPATIENT)
Dept: URBAN - METROPOLITAN AREA CLINIC 257 | Age: 79
Setting detail: DERMATOLOGY
End: 2024-05-28

## 2024-05-23 DIAGNOSIS — L57.8 OTHER SKIN CHANGES DUE TO CHRONIC EXPOSURE TO NONIONIZING RADIATION: ICD-10-CM

## 2024-05-23 DIAGNOSIS — D49.2 NEOPLASM OF UNSPECIFIED BEHAVIOR OF BONE, SOFT TISSUE, AND SKIN: ICD-10-CM

## 2024-05-23 PROCEDURE — OTHER COUNSELING: OTHER

## 2024-05-23 PROCEDURE — 11102 TANGNTL BX SKIN SINGLE LES: CPT

## 2024-05-23 PROCEDURE — OTHER MIPS QUALITY: OTHER

## 2024-05-23 PROCEDURE — OTHER BIOPSY BY SHAVE METHOD: OTHER

## 2024-05-23 PROCEDURE — 99212 OFFICE O/P EST SF 10 MIN: CPT | Mod: 25

## 2024-05-23 ASSESSMENT — LOCATION SIMPLE DESCRIPTION DERM
LOCATION SIMPLE: UPPER BACK
LOCATION SIMPLE: LEFT ACHILLES SKIN

## 2024-05-23 ASSESSMENT — LOCATION DETAILED DESCRIPTION DERM
LOCATION DETAILED: LEFT ACHILLES SKIN
LOCATION DETAILED: SUPERIOR THORACIC SPINE

## 2024-05-23 ASSESSMENT — LOCATION ZONE DERM
LOCATION ZONE: LEG
LOCATION ZONE: TRUNK

## 2024-05-23 NOTE — HPI: SECONDARY COMPLAINT
How Severe Is This Condition?: moderate
Additional History: Previously Biopsied back in 2020 by  and was confirmed as benign seborrheic keratosis

## 2024-05-23 NOTE — PROCEDURE: BIOPSY BY SHAVE METHOD

## 2024-05-27 ASSESSMENT — SLEEP AND FATIGUE QUESTIONNAIRES
HOW LIKELY ARE YOU TO NOD OFF OR FALL ASLEEP WHILE SITTING AND READING: WOULD NEVER DOZE
HOW LIKELY ARE YOU TO NOD OFF OR FALL ASLEEP WHILE SITTING QUIETLY AFTER LUNCH WITHOUT ALCOHOL: WOULD NEVER DOZE
HOW LIKELY ARE YOU TO NOD OFF OR FALL ASLEEP IN A CAR, WHILE STOPPED FOR A FEW MINUTES IN TRAFFIC: WOULD NEVER DOZE
HOW LIKELY ARE YOU TO NOD OFF OR FALL ASLEEP WHEN YOU ARE A PASSENGER IN A CAR FOR AN HOUR WITHOUT A BREAK: WOULD NEVER DOZE
HOW LIKELY ARE YOU TO NOD OFF OR FALL ASLEEP WHILE LYING DOWN TO REST IN THE AFTERNOON WHEN CIRCUMSTANCES PERMIT: SLIGHT CHANCE OF DOZING
HOW LIKELY ARE YOU TO NOD OFF OR FALL ASLEEP WHILE WATCHING TV: SLIGHT CHANCE OF DOZING
HOW LIKELY ARE YOU TO NOD OFF OR FALL ASLEEP WHILE SITTING INACTIVE IN A PUBLIC PLACE: WOULD NEVER DOZE
HOW LIKELY ARE YOU TO NOD OFF OR FALL ASLEEP WHILE SITTING AND TALKING TO SOMEONE: WOULD NEVER DOZE

## 2024-05-29 ENCOUNTER — OFFICE VISIT (OUTPATIENT)
Dept: SLEEP MEDICINE | Facility: CLINIC | Age: 79
End: 2024-05-29
Payer: COMMERCIAL

## 2024-05-29 VITALS
WEIGHT: 220 LBS | BODY MASS INDEX: 31.5 KG/M2 | OXYGEN SATURATION: 95 % | HEIGHT: 70 IN | SYSTOLIC BLOOD PRESSURE: 120 MMHG | DIASTOLIC BLOOD PRESSURE: 79 MMHG | HEART RATE: 70 BPM

## 2024-05-29 DIAGNOSIS — G47.33 OSA (OBSTRUCTIVE SLEEP APNEA): Primary | ICD-10-CM

## 2024-05-29 PROCEDURE — 99213 OFFICE O/P EST LOW 20 MIN: CPT | Performed by: INTERNAL MEDICINE

## 2024-05-29 NOTE — NURSING NOTE
"Chief Complaint   Patient presents with    Sleep Problem     Reestablish care       Initial /79   Pulse 70   Ht 1.778 m (5' 10\")   Wt 99.8 kg (220 lb)   SpO2 95%   BMI 31.57 kg/m   Estimated body mass index is 31.57 kg/m  as calculated from the following:    Height as of this encounter: 1.778 m (5' 10\").    Weight as of this encounter: 99.8 kg (220 lb).    Medication Reconciliation: complete  ESS 2  Neck circumference: 43 centimeters.  Sara Tucker MA   "

## 2024-05-29 NOTE — PROGRESS NOTES
Sleep Consultation:    Date on this visit: 5/29/2024    Martell Coelho  is referred by No ref. provider found for a sleep consultation.     Primary Physician: Javier Adrian     Martell Coelho presents to clinic for management of obstructive sleep apnea.     Patient was dinitially diagnosed with sleep apnea in 2013 with an apnea hypopnea index of 22.3 per hour. He had a reevaluation in 2017 after weight loss. PSG on 7/27/2017 at weight of 258 lbs showed an AHI of 7.2 per hour. Baseline O2 saturation was 92.7% and time spent below 88% was 2.2 minutes. Patient was prescribed CPAP after the sleep study.     Overall, he rates the experience with PAP as good. The mask is comfortable. The mask is not leaking.  He is not snoring with the mask on. He is not having gasp arousals.  He is not having significant oral/nasal dryness. The pressure settings are comfortable.     He does feel rested in the morning.    ResMed     Auto-PAP 6.0 - 11.0 cmH2O 30 day usage data:    100% of days with > 4 hours of use. 0/30 days with no use.   Average use 475 minutes per day.   95%ile Leak 5.57 L/min.   CPAP 95% pressure 10.9 cm.   AHI 0.16 events per hour.     Martell goes to sleep at 11:00 PM. He wakes up at 7:00 AM. He falls asleep in 10 - 30 minutes.  Martell denies difficulty falling asleep.  He wakes up 3-4 times a night for 10 minutes before falling back to sleep.  Martell wakes up to go to the bathroom and uncertain reasons.  Patient gets an average of 7 hours of sleep per night.     Martell denies any sleep walking, sleep talking, dream enactment, sleep paralysis, cataplexy, and hypnogogic/hypnopompic hallucinations.     Patient's Huslia Sleepiness score 2/24 consistent with no daytime sleepiness.      Martell naps 0 times per week . He takes some inadvertant naps.  He denies closing eyes, dozing, and falling asleep while driving. Patient was counseled on the importance of driving while alert, to pull over if drowsy, or nap before  "getting into the vehicle if sleepy.      He uses 1 cups/day of coffee. Last caffeine intake is usually before noon.    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  Past Surgical History:   Procedure Laterality Date    APPENDECTOMY      CYSTOSCOPY, TRANSURETHRAL RESECTION (TUR) PROSTATE, COMBINED       Social History     Tobacco Use    Smoking status: Former    Smokeless tobacco: Never   Substance Use Topics    Alcohol use: No       Physical Examination:  Vitals: /79   Pulse 70   Ht 1.778 m (5' 10\")   Wt 99.8 kg (220 lb)   SpO2 95%   BMI 31.57 kg/m    BMI= Body mass index is 31.57 kg/m .  GENERAL APPEARANCE: healthy, alert, and no distress  NEURO: mentation intact and speech normal  PSYCH: mentation appears normal and affect normal/bright    Impression/Plan:    Obstructive sleep apnea    -Patient is using CPAP regularly and continuing to benefit from therapy.  I reviewed download data and graphs from his device for the last 30 days.  Regular compliance and normal residual AHI is demonstrated, confirming adequate treatment of sleep apnea.    -Patient was previously prescribed supplemental oxygen during sleep to see if polycythemia that was attributed to hypoxemia could improve.  There was no improvement with oxygen.  Patient was started based on mild hypoxemia that was noted with CPAP.  After consultations at [facility], current working " diagnosis is that he may have a congenital polycythemia.  Oxygen therapy was discontinued when he had disruptions to treatment when he spent a long period of time in [place].  In order to requalify, he will need a titration PSG in the sleep lab.  Patient has been monitoring oxygen saturations in sleep with a portable monitor and has not noticed any significant hypoxemia.  He declines sleep testing.    Plan:     Continue auto PAP therapy 6-11 cm H2O      I spent a total of 30 minutes for this appointment on this date of service which include time spent before, during and after the visit for chart review, patient care, counseling and coordination of care.    Dr. Constantin Woody       CC: No ref. provider found

## 2024-06-04 ENCOUNTER — ONCOLOGY VISIT (OUTPATIENT)
Dept: ONCOLOGY | Facility: CLINIC | Age: 79
End: 2024-06-04
Attending: INTERNAL MEDICINE
Payer: COMMERCIAL

## 2024-06-04 ENCOUNTER — RX ONLY (RX ONLY)
Age: 79
End: 2024-06-04

## 2024-06-04 VITALS
OXYGEN SATURATION: 99 % | DIASTOLIC BLOOD PRESSURE: 77 MMHG | BODY MASS INDEX: 33.21 KG/M2 | SYSTOLIC BLOOD PRESSURE: 118 MMHG | HEART RATE: 80 BPM | HEIGHT: 70 IN | WEIGHT: 232 LBS

## 2024-06-04 DIAGNOSIS — D75.1 POLYCYTHEMIA SECONDARY TO HYPOXIA: Primary | ICD-10-CM

## 2024-06-04 DIAGNOSIS — E61.1 IRON DEFICIENCY: ICD-10-CM

## 2024-06-04 PROCEDURE — 99213 OFFICE O/P EST LOW 20 MIN: CPT | Performed by: INTERNAL MEDICINE

## 2024-06-04 PROCEDURE — G0463 HOSPITAL OUTPT CLINIC VISIT: HCPCS | Performed by: INTERNAL MEDICINE

## 2024-06-04 RX ORDER — TRIAMCINOLONE ACETONIDE 1 MG/G
OINTMENT TOPICAL
Qty: 30 | Refills: 0 | Status: ERX | COMMUNITY
Start: 2024-06-04

## 2024-06-04 ASSESSMENT — PAIN SCALES - GENERAL: PAINLEVEL: NO PAIN (0)

## 2024-06-04 NOTE — LETTER
2024      Martell Coelho  [address]  [facility] [address]      Dear Colleague,    Thank you for referring your patient, Martell Coelho, to the [facility] [place]. Please see a copy of my visit note below.    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  No improvement w/ sleep apnea therapies.  Negative congenital polycythemia testing.    Martell and I reviewed negative congenital polycythemia testing performed through the [facility] algorithmic testing.  We reviewed that this testing is quite comprehensive but can miss rare mutations and unknown mutation.  With his primary care provider, he continues with monthly phlebotomy to try to keep his hematocrit less than 48 which I think is incredibly reasonable.  For now, we agreed to continue holding on a bone marrow biopsy in light of his negative MPN genetic testing.  Continue aspirin 81 mg indefinitely provided he does not have any GI bleeding episodes.  No indication for hydroxyurea at this time.  Continue supportive cares for sleep apnea.    Migel Shaw MD    --------------------    Interval History:  Martell returns for follow-up polycythemia unaccompanied.  All in all, he remains well.  Continues with monthly phlebotomy through primary care provider.    Social History:  Social History     Tobacco Use     Smoking status: Former     Smokeless tobacco: Never   Substance Use Topics     Alcohol use: No     Family History:  Family History   Problem Relation Age of Onset     Cancer Father         Leukemia     Immunizations:  Immunization History   Administered Date(s) Administered     COVID-19 MONOVALENT 12+ (Pfizer) 2021, 2021     Health Maintenance Due   Topic Date Due     COVID-19 Vaccine (3 - 2023- season) 2023  "    --------------------    Physical Exam:  VS: /77 (BP Location: Left arm, Patient Position: Chair, Cuff Size: Adult Large)   Pulse 80   Ht 1.778 m (5' 10\")   Wt 105.2 kg (232 lb)   SpO2 99%   BMI 33.29 kg/m  .  GEN: Well appearing.    Labs / Imaging:  Reviewed congenital polycythemia testing.      Again, thank you for allowing me to participate in the care of your patient.        Sincerely,        Migel Shaw MD  "

## 2024-06-04 NOTE — NURSING NOTE
"Oncology Rooming Note    June 4, 2024 10:27 AM   Martell Coelho is a 78 year old male who presents for:    Chief Complaint   Patient presents with    Oncology Clinic Visit     Follow up     Initial Vitals: /77 (BP Location: Left arm, Patient Position: Chair, Cuff Size: Adult Large)   Pulse 80   Ht 1.778 m (5' 10\")   Wt 105.2 kg (232 lb)   SpO2 99%   BMI 33.29 kg/m   Estimated body mass index is 33.29 kg/m  as calculated from the following:    Height as of this encounter: 1.778 m (5' 10\").    Weight as of this encounter: 105.2 kg (232 lb). Body surface area is 2.28 meters squared.  No Pain (0) Comment: Data Unavailable   No LMP for male patient.  Allergies reviewed: Yes  Medications reviewed: Yes    Medications: Medication refills not needed today.  Pharmacy name entered into [employer]:    [id number] [place], [place] - [address] [place] [address]    Frailty Screening:   Is the patient here for a new oncology consult visit in cancer care? 2. No      Clinical concerns: NO       Olena Gómez CMA              "

## 2024-06-05 NOTE — PROGRESS NOTES
"[facility] Hematology / Oncology  Progress Note  Name: Martell Coelho  :  1945    MRN:  [id number]    --------------------    Assessment / Plan:  Polycytchemia, mild to moderate.  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  No improvement w/ sleep apnea therapies.  Negative congenital polycythemia testing.    Martell and I reviewed negative congenital polycythemia testing performed through the [facility] algorithmic testing.  We reviewed that this testing is quite comprehensive but can miss rare mutations and unknown mutation.  With his primary care provider, he continues with monthly phlebotomy to try to keep his hematocrit less than 48 which I think is incredibly reasonable.  For now, we agreed to continue holding on a bone marrow biopsy in light of his negative MPN genetic testing.  Continue aspirin 81 mg indefinitely provided he does not have any GI bleeding episodes.  No indication for hydroxyurea at this time.  Continue supportive cares for sleep apnea.    Migel Shaw MD    --------------------    Interval History:  Martell returns for follow-up polycythemia unaccompanied.  All in all, he remains well.  Continues with monthly phlebotomy through primary care provider.    Social History:  Social History     Tobacco Use    Smoking status: Former    Smokeless tobacco: Never   Substance Use Topics    Alcohol use: No     Family History:  Family History   Problem Relation Age of Onset    Cancer Father         Leukemia     Immunizations:  Immunization History   Administered Date(s) Administered    COVID-19 MONOVALENT 12+ (Pfizer) 2021, 2021     Health Maintenance Due   Topic Date Due    COVID-19 Vaccine (3 - 2023-24 season) 2023     --------------------    Physical Exam:  VS: /77 (BP Location: Left arm, Patient Position: Chair, Cuff Size: Adult Large)   Pulse 80   Ht 1.778 m (5' 10\")   Wt 105.2 kg (232 lb)   SpO2 99%   BMI 33.29 kg/m  .  GEN: Well appearing.    Labs / Imaging:  Reviewed " congenital polycythemia testing.

## 2024-08-05 ENCOUNTER — TELEPHONE (OUTPATIENT)
Dept: NEUROLOGY | Facility: CLINIC | Age: 79
End: 2024-08-05
Payer: COMMERCIAL

## 2024-08-05 NOTE — TELEPHONE ENCOUNTER
Action 8/5/24 MV 12.48pm   Action Taken Imaging request faxed to [facility]    UPDATE: images resolved       RECORDS RECEIVED FROM: external   REASON FOR VISIT: Neuropathy    PROVIDER: Dr Mainor Azevedo   DATE OF APPT: 8/7/24   NOTES (FOR ALL VISITS) STATUS DETAILS   OFFICE NOTE from referring provider Care Everywhere Dr Hakan Mendoza @ [facility]:  3/5/24 encounter (no notes)   OFFICE NOTE from other specialist internal Dr Mainor Azevedo @ [facility]:  5/11/21  3/23/21   [facility] internal [facility]:  5/4/21   MEDICATION LIST Care Everywhere    IMAGING  (FOR ALL VISITS)     MRI (HEAD, NECK, SPINE) PACS [facility] Beck:  MRI Lumbar Spine 3/8/24

## 2024-08-05 NOTE — TELEPHONE ENCOUNTER
Patient confirmed scheduled appointment:  Date: 8/6  Time: 1:30 pm  Visit type: New Neurology   Provider:    Location: [facility]- pt aware   Testing/imaging: n/a  Additional notes: provider not in clinic 8/7, rescheduled for 8/6.      Citlaly Greenfield on 8/5/2024 at 1:57 PM      
Nephro paged. Awaiting call back. Guillermo Bowman PA-C

## 2024-08-06 ENCOUNTER — OFFICE VISIT (OUTPATIENT)
Dept: NEUROLOGY | Facility: CLINIC | Age: 79
End: 2024-08-06
Payer: COMMERCIAL

## 2024-08-06 VITALS
OXYGEN SATURATION: 97 % | WEIGHT: 226 LBS | BODY MASS INDEX: 32.43 KG/M2 | HEART RATE: 71 BPM | SYSTOLIC BLOOD PRESSURE: 128 MMHG | DIASTOLIC BLOOD PRESSURE: 85 MMHG

## 2024-08-06 DIAGNOSIS — G60.8 PERIPHERAL SENSORY NEUROPATHY: ICD-10-CM

## 2024-08-06 PROCEDURE — 99203 OFFICE O/P NEW LOW 30 MIN: CPT | Performed by: PSYCHIATRY & NEUROLOGY

## 2024-08-06 NOTE — LETTER
"8/6/2024       RE: Martell Coelho  [address] [facility] [address]     Dear Colleague,    Thank you for referring your patient, Martell Coelho, to the [facility] [place] [facility]. Please see a copy of my visit note below.    78 year old man who I have seen in the past for burning discomfort in the feet, which is the same or better than before, and has not affected his hands. His chief complaint today is bilateral buttock pain radiating into the groin, thighs, and upper legs, and an abnormal gait (\"I walk like a duck\"). 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         PMH: erytrhocytosis, essential tremor. Abdominal surgery for appendix, gall bladder, and intestinal torsion surgery.     Nonsmoker. Does not drink alcohol.       Mental state: Alert, appropriate, speech, language, and thought content normal.      Cranial nerves II-XII: normal. With rightward gaze, left eye elevates; he indicates this is lifelong, and denies diplopia.      Sensory examination:       Right Left   Light touch Normal Normal   Vibration (timed) MM7 K3   Vibration (Rydell-Seiffer)       Temp       Pin Normal Normal   Pos   Normal   Legend:   MM = medial malleolus, TT = tibial tuberosity, K = patella, MCP = MCP joint  MF = mid-foot, DC = distal calf, MC = mid calf, PC = proximal calf        Motor examination:       Right Left   Shoulder abduction        5 5   Elbow extension 5 5   Elbow flexion 5 5   Wrist extension         5 5   Finger extension 5 5   FDI 5 5   APB 5 5   Hip flexion 5 5   Knee flexion 5 5   Knee extension 5 5   Dorsiflexion 5 5   Plantar flexion 5 5   A=atrophy     Tone normal     Reflexes:    Right Left   Biceps 2 1   BRD 2 1   Triceps 1 1   Patellar Tr Tr   Achilles Tr 0   Plantar Flexor Flexor   Clonus Absent " Absent      Coordination:  Finger-nose normal.  Heel-shin normal.  RRMs normal.     Gait:  Independent. Lilt. Normal base. Normal arm swing. Turns well. Romberg negative. Pull test negative. Tandem normal.      Impression and recommendations:  Stable or improved neurological examination.  Chief complaint is not likely related to his neuropathy.   RTC as needed.    Mainor Azevedo M.D.    35 minutes spent on the date of the encounter on chart review, history and examination, documentation and further activities as noted above.       Again, thank you for allowing me to participate in the care of your patient.      Sincerely,    Mainor Azevedo MD

## 2024-08-06 NOTE — PROGRESS NOTES
"78 year old man who I have seen in the past for burning discomfort in the feet, which is the same or better than before, and has not affected his hands. His chief complaint today is bilateral buttock pain radiating into the groin, thighs, and upper legs, and an abnormal gait (\"I walk like a duck\"). 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         PMH: erytrhocytosis, essential tremor. Abdominal surgery for appendix, gall bladder, and intestinal torsion surgery.     Nonsmoker. Does not drink alcohol.       Mental state: Alert, appropriate, speech, language, and thought content normal.      Cranial nerves II-XII: normal. With rightward gaze, left eye elevates; he indicates this is lifelong, and denies diplopia.      Sensory examination:       Right Left   Light touch Normal Normal   Vibration (timed) MM7 K3   Vibration (Rydell-Seiffer)       Temp       Pin Normal Normal   Pos   Normal   Legend:   MM = medial malleolus, TT = tibial tuberosity, K = patella, MCP = MCP joint  MF = mid-foot, DC = distal calf, MC = mid calf, PC = proximal calf        Motor examination:       Right Left   Shoulder abduction        5 5   Elbow extension 5 5   Elbow flexion 5 5   Wrist extension         5 5   Finger extension 5 5   FDI 5 5   APB 5 5   Hip flexion 5 5   Knee flexion 5 5   Knee extension 5 5   Dorsiflexion 5 5   Plantar flexion 5 5   A=atrophy     Tone normal     Reflexes:    Right Left   Biceps 2 1   BRD 2 1   Triceps 1 1   Patellar Tr Tr   Achilles Tr 0   Plantar Flexor Flexor   Clonus Absent Absent      Coordination:  Finger-nose normal.  Heel-shin normal.  RRMs normal.     Gait:  Independent. Lilt. Normal base. Normal arm swing. Turns well. Romberg negative. Pull test negative. Tandem normal.      Impression and recommendations:  Stable or improved neurological examination.  Chief complaint is not " likely related to his neuropathy.   RTC as needed.    Mainor Azevedo M.D.    35 minutes spent on the date of the encounter on chart review, history and examination, documentation and further activities as noted above.

## 2024-08-07 ENCOUNTER — PRE VISIT (OUTPATIENT)
Dept: NEUROLOGY | Facility: CLINIC | Age: 79
End: 2024-08-07

## 2024-12-03 ENCOUNTER — APPOINTMENT (OUTPATIENT)
Dept: URBAN - METROPOLITAN AREA CLINIC 257 | Age: 79
Setting detail: DERMATOLOGY
End: 2024-12-04

## 2024-12-03 DIAGNOSIS — L57.0 ACTINIC KERATOSIS: ICD-10-CM

## 2024-12-03 DIAGNOSIS — L81.4 OTHER MELANIN HYPERPIGMENTATION: ICD-10-CM

## 2024-12-03 DIAGNOSIS — Z85.828 PERSONAL HISTORY OF OTHER MALIGNANT NEOPLASM OF SKIN: ICD-10-CM

## 2024-12-03 DIAGNOSIS — D22 MELANOCYTIC NEVI: ICD-10-CM

## 2024-12-03 DIAGNOSIS — L82.1 OTHER SEBORRHEIC KERATOSIS: ICD-10-CM

## 2024-12-03 DIAGNOSIS — D18.0 HEMANGIOMA: ICD-10-CM

## 2024-12-03 DIAGNOSIS — Z71.89 OTHER SPECIFIED COUNSELING: ICD-10-CM

## 2024-12-03 DIAGNOSIS — L57.8 OTHER SKIN CHANGES DUE TO CHRONIC EXPOSURE TO NONIONIZING RADIATION: ICD-10-CM

## 2024-12-03 DIAGNOSIS — L82.0 INFLAMED SEBORRHEIC KERATOSIS: ICD-10-CM

## 2024-12-03 PROBLEM — D18.01 HEMANGIOMA OF SKIN AND SUBCUTANEOUS TISSUE: Status: ACTIVE | Noted: 2024-12-03

## 2024-12-03 PROBLEM — D22.5 MELANOCYTIC NEVI OF TRUNK: Status: ACTIVE | Noted: 2024-12-03

## 2024-12-03 PROCEDURE — 99213 OFFICE O/P EST LOW 20 MIN: CPT | Mod: 25

## 2024-12-03 PROCEDURE — OTHER MIPS QUALITY: OTHER

## 2024-12-03 PROCEDURE — 17000 DESTRUCT PREMALG LESION: CPT | Mod: 59

## 2024-12-03 PROCEDURE — 17003 DESTRUCT PREMALG LES 2-14: CPT | Mod: 59

## 2024-12-03 PROCEDURE — OTHER COUNSELING: OTHER

## 2024-12-03 PROCEDURE — OTHER LIQUID NITROGEN: OTHER

## 2024-12-03 PROCEDURE — 17110 DESTRUCT B9 LESION 1-14: CPT

## 2024-12-03 ASSESSMENT — LOCATION DETAILED DESCRIPTION DERM
LOCATION DETAILED: LEFT SUPERIOR PARIETAL SCALP
LOCATION DETAILED: LEFT MEDIAL UPPER BACK
LOCATION DETAILED: RIGHT POSTERIOR SHOULDER
LOCATION DETAILED: LEFT SUPERIOR FOREHEAD
LOCATION DETAILED: RIGHT CENTRAL FRONTAL SCALP
LOCATION DETAILED: LEFT SUPERIOR MEDIAL UPPER BACK
LOCATION DETAILED: LEFT MEDIAL FRONTAL SCALP
LOCATION DETAILED: LEFT SUPERIOR CENTRAL MALAR CHEEK
LOCATION DETAILED: LEFT SUPERIOR MEDIAL MALAR CHEEK
LOCATION DETAILED: RIGHT SUPERIOR PARIETAL SCALP
LOCATION DETAILED: LEFT INFERIOR CENTRAL MALAR CHEEK

## 2024-12-03 ASSESSMENT — LOCATION SIMPLE DESCRIPTION DERM
LOCATION SIMPLE: LEFT SCALP
LOCATION SIMPLE: SCALP
LOCATION SIMPLE: RIGHT SCALP
LOCATION SIMPLE: LEFT FOREHEAD
LOCATION SIMPLE: LEFT UPPER BACK
LOCATION SIMPLE: LEFT CHEEK
LOCATION SIMPLE: RIGHT SHOULDER

## 2024-12-03 ASSESSMENT — LOCATION ZONE DERM
LOCATION ZONE: ARM
LOCATION ZONE: SCALP
LOCATION ZONE: TRUNK
LOCATION ZONE: FACE

## 2024-12-03 NOTE — HPI: FULL BODY SKIN EXAMINATION
What Type Of Note Output Would You Prefer (Optional)?: Bullet Format
What Is The Reason For Today's Visit?: Full Body Skin Examination
What Is The Reason For Today's Visit? (Being Monitored For X): concerning skin lesions on an annual basis
Additional History: Patient has no specific lesions/moles of concern today.

## 2024-12-03 NOTE — PROCEDURE: LIQUID NITROGEN
Render In Bullet Format When Appropriate: No
Post-Care Instructions: I reviewed with the patient in detail post-care instructions. Patient is to wear sunprotection, and avoid picking at any of the treated lesions. Pt may apply Vaseline to crusted or scabbing areas.
Number Of Freeze-Thaw Cycles: 1 freeze-thaw cycle
Detail Level: Zone
Total Number Of Aks Treated: 5
Consent: The patient's consent was obtained including but not limited to risks of crusting, scabbing, blistering, scarring, darker or lighter pigmentary change, recurrence, incomplete removal and infection.
Spray Paint Text: The liquid nitrogen was applied to the skin utilizing a spray paint frosting technique.
Medical Necessity Clause: This procedure was medically necessary because the lesions that were treated were:
Total Number Of Lesions Treated: 3
Render Post Care In The Note?: yes
Medical Necessity Information: It is in your best interest to select a reason for this procedure from the list below. All of these items fulfill various CMS LCD requirements except the new and changing color options.

## 2025-02-20 NOTE — PROGRESS NOTES
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 EDx personally reviewed today and raise a question of a non-length dependent process in my view. PNP negative. Denies sicca.      Mental state: Alert, appropriate, speech, language, and thought content normal.       Sensory examination:     Right Left   Light touch Normal Normal   Vibration (timed)     Vibration (Rydell-Seiffer) K5 K5   Temp     Pin MF Normal   Pos     Legend:   MM = medial malleolus, TT = tibial tuberosity, K = patella, MCP = MCP joint  MF = mid-foot, DC = distal calf, MC = mid calf, PC = proximal calf      Motor examination:     Right Left   Dorsiflexion 5 5   Plantar flexion 5 5   A=atrophy    Tone normal     Reflexes:   Right Left   Biceps 1 1   Patellar 1 1   Achilles Tr Tr reinf only   Plantar Flexor Flexor   Clonus Absent Absent      Impression:  1. Stable exam  2. Query ganglionopathy b/o EDx    Recommendations:  1. Extend labs  2. I will consult with hematology regarding possible further surveillance for occult malignancy or developing hematologic malignancy despite negative paraneoplastic panel.  3. RTC 4-5 months.    Mainor Azevedo M.D.    Addendum: discussed with hematologist - patient has had CT AP in the [facility] system recently and chest imaging with pulmonologist. She did not feel there is need for bone marrow biopsy.

## 2025-04-09 ENCOUNTER — APPOINTMENT (OUTPATIENT)
Dept: URBAN - METROPOLITAN AREA CLINIC 257 | Age: 80
Setting detail: DERMATOLOGY
End: 2025-04-10

## 2025-04-09 DIAGNOSIS — L82.1 OTHER SEBORRHEIC KERATOSIS: ICD-10-CM

## 2025-04-09 DIAGNOSIS — Z85.828 PERSONAL HISTORY OF OTHER MALIGNANT NEOPLASM OF SKIN: ICD-10-CM

## 2025-04-09 DIAGNOSIS — L81.4 OTHER MELANIN HYPERPIGMENTATION: ICD-10-CM

## 2025-04-09 DIAGNOSIS — D18.0 HEMANGIOMA: ICD-10-CM

## 2025-04-09 DIAGNOSIS — Z71.89 OTHER SPECIFIED COUNSELING: ICD-10-CM

## 2025-04-09 DIAGNOSIS — D22 MELANOCYTIC NEVI: ICD-10-CM

## 2025-04-09 DIAGNOSIS — L57.8 OTHER SKIN CHANGES DUE TO CHRONIC EXPOSURE TO NONIONIZING RADIATION: ICD-10-CM

## 2025-04-09 DIAGNOSIS — L85.3 XEROSIS CUTIS: ICD-10-CM

## 2025-04-09 PROBLEM — D22.5 MELANOCYTIC NEVI OF TRUNK: Status: ACTIVE | Noted: 2025-04-09

## 2025-04-09 PROBLEM — D18.01 HEMANGIOMA OF SKIN AND SUBCUTANEOUS TISSUE: Status: ACTIVE | Noted: 2025-04-09

## 2025-04-09 PROCEDURE — OTHER MEDICATION COUNSELING: OTHER

## 2025-04-09 PROCEDURE — OTHER COUNSELING: OTHER

## 2025-04-09 PROCEDURE — 99213 OFFICE O/P EST LOW 20 MIN: CPT

## 2025-04-09 PROCEDURE — OTHER PRESCRIPTION MEDICATION MANAGEMENT: OTHER

## 2025-04-09 PROCEDURE — OTHER MIPS QUALITY: OTHER

## 2025-04-09 PROCEDURE — OTHER PRESCRIPTION: OTHER

## 2025-04-09 RX ORDER — HYDROCORTISONE 25 MG/G
OINTMENT TOPICAL
Qty: 28.35 | Refills: 3 | Status: ERX | COMMUNITY
Start: 2025-04-09

## 2025-04-09 ASSESSMENT — LOCATION SIMPLE DESCRIPTION DERM
LOCATION SIMPLE: LEFT UPPER BACK
LOCATION SIMPLE: LEFT SCALP
LOCATION SIMPLE: RIGHT SHOULDER

## 2025-04-09 ASSESSMENT — LOCATION ZONE DERM
LOCATION ZONE: ARM
LOCATION ZONE: TRUNK
LOCATION ZONE: SCALP

## 2025-04-09 ASSESSMENT — LOCATION DETAILED DESCRIPTION DERM
LOCATION DETAILED: LEFT SUPERIOR MEDIAL UPPER BACK
LOCATION DETAILED: LEFT MEDIAL UPPER BACK
LOCATION DETAILED: RIGHT POSTERIOR SHOULDER
LOCATION DETAILED: LEFT MEDIAL FRONTAL SCALP

## 2025-04-09 NOTE — PROCEDURE: PRESCRIPTION MEDICATION MANAGEMENT
Initiate Treatment: Hydrocortisone 2.5% ointment BID
Detail Level: Zone
Render In Strict Bullet Format?: No

## 2025-04-09 NOTE — HPI: FULL BODY SKIN EXAMINATION
What Type Of Note Output Would You Prefer (Optional)?: Standard Output
What Is The Reason For Today's Visit?: Full Body Skin Examination
What Is The Reason For Today's Visit? (Being Monitored For X): concerning skin lesions on an annual basis
Additional History: Patient reports he got his haircut yesterday and the stylist mentioned a red spot on his left anterior scalp. Patient denies any other concerns.

## 2025-04-09 NOTE — PROCEDURE: MEDICATION COUNSELING
VTAMA Counseling: I discussed with the patient that VTAMA is not for use in the eyes, mouth or mouth. They should call the office if they develop any signs of allergic reactions to VTAMA. The patient verbalized understanding of the proper use and possible adverse effects of VTAMA.  All of the patient's questions and concerns were addressed.
Spironolactone Pregnancy And Lactation Text: This medication can cause feminization of the male fetus and should be avoided during pregnancy. The active metabolite is also found in breast milk.
Adbry Counseling: I discussed with the patient the risks of tralokinumab including but not limited to eye infection and irritation, cold sores, injection site reactions, worsening of asthma, allergic reactions and increased risk of parasitic infection.  Live vaccines should be avoided while taking tralokinumab. The patient understands that monitoring is required and they must alert us or the primary physician if symptoms of infection or other concerning signs are noted.
Fluconazole Pregnancy And Lactation Text: This medication is Pregnancy Category C and it isn't know if it is safe during pregnancy. It is also excreted in breast milk.
Topical Retinoid counseling:  Patient advised to apply a pea-sized amount only at bedtime and wait 30 minutes after washing their face before applying.  If too drying, patient may add a non-comedogenic moisturizer. The patient verbalized understanding of the proper use and possible adverse effects of retinoids.  All of the patient's questions and concerns were addressed.
Stelara Counseling:  I discussed with the patient the risks of ustekinumab including but not limited to immunosuppression, malignancy, posterior leukoencephalopathy syndrome, and serious infections.  The patient understands that monitoring is required including a PPD at baseline and must alert us or the primary physician if symptoms of infection or other concerning signs are noted.
High Dose Vitamin A Pregnancy And Lactation Text: High dose vitamin A therapy is contraindicated during pregnancy and breast feeding.
Erivedge Counseling- I discussed with the patient the risks of Erivedge including but not limited to nausea, vomiting, diarrhea, constipation, weight loss, changes in the sense of taste, decreased appetite, muscle spasms, and hair loss.  The patient verbalized understanding of the proper use and possible adverse effects of Erivedge.  All of the patient's questions and concerns were addressed.
Low Dose Naltrexone Pregnancy And Lactation Text: Naltrexone is pregnancy category C.  There have been no adequate and well-controlled studies in pregnant women.  It should be used in pregnancy only if the potential benefit justifies the potential risk to the fetus.   Limited data indicates that naltrexone is minimally excreted into breastmilk.
Ilumya Counseling: I discussed with the patient the risks of tildrakizumab including but not limited to immunosuppression, malignancy, posterior leukoencephalopathy syndrome, and serious infections.  The patient understands that monitoring is required including a PPD at baseline and must alert us or the primary physician if symptoms of infection or other concerning signs are noted.
Minoxidil Counseling: Minoxidil is a topical medication which can increase blood flow where it is applied. It is uncertain how this medication increases hair growth. Side effects are uncommon and include stinging and allergic reactions.
Thalidomide Pregnancy And Lactation Text: This medication is Pregnancy Category X and is absolutely contraindicated during pregnancy. It is unknown if it is excreted in breast milk.
Cyclosporine Pregnancy And Lactation Text: This medication is Pregnancy Category C and it isn't know if it is safe during pregnancy. This medication is excreted in breast milk.
Ilumya Pregnancy And Lactation Text: The risk during pregnancy and breastfeeding is uncertain with this medication.
Olumiant Counseling: I discussed with the patient the risks of Olumiant therapy including but not limited to upper respiratory tract infections, shingles, cold sores, and nausea. Live vaccines should be avoided.  This medication has been linked to serious infections; higher rate of mortality; malignancy and lymphoproliferative disorders; major adverse cardiovascular events; thrombosis; gastrointestinal perforations; neutropenia; lymphopenia; anemia; liver enzyme elevations; and lipid elevations.
Calcipotriene Counseling:  I discussed with the patient the risks of calcipotriene including but not limited to erythema, scaling, itching, and irritation.
Methotrexate Counseling:  Patient counseled regarding adverse effects of methotrexate including but not limited to nausea, vomiting, abnormalities in liver function tests. Patients may develop mouth sores, rash, diarrhea, and abnormalities in blood counts. The patient understands that monitoring is required including LFT's and blood counts.  There is a rare possibility of scarring of the liver and lung problems that can occur when taking methotrexate. Persistent nausea, loss of appetite, pale stools, dark urine, cough, and shortness of breath should be reported immediately. Patient advised to discontinue methotrexate treatment at least three months before attempting to become pregnant.  I discussed the need for folate supplements while taking methotrexate.  These supplements can decrease side effects during methotrexate treatment. The patient verbalized understanding of the proper use and possible adverse effects of methotrexate.  All of the patient's questions and concerns were addressed.
Zepbound Counseling: I reviewed the possible side effects including: thyroid tumors, kidney disease, gallbladder disease, abdominal pain, constipation, diarrhea, nausea, vomiting and pancreatitis. Do not take this medication if you have a history or family history of multiple endocrine neoplasia syndrome type 2. Side effects reviewed, pt to contact office should one occur.
Vtama Pregnancy And Lactation Text: It is unknown if this medication can cause problems during pregnancy and breastfeeding.
Adbry Pregnancy And Lactation Text: It is unknown if this medication will adversely affect pregnancy or breast feeding.
Topical Retinoid Pregnancy And Lactation Text: This medication is Pregnancy Category C. It is unknown if this medication is excreted in breast milk.
Griseofulvin Counseling:  I discussed with the patient the risks of griseofulvin including but not limited to photosensitivity, cytopenia, liver damage, nausea/vomiting and severe allergy.  The patient understands that this medication is best absorbed when taken with a fatty meal (e.g., ice cream or french fries).
Niacinamide Counseling: I recommended taking niacin or niacinamide, also know as vitamin B3, twice daily. Recent evidence suggests that taking vitamin B3 (500 mg twice daily) can reduce the risk of actinic keratoses and non-melanoma skin cancers. Side effects of vitamin B3 include flushing and headache.
Stelara Pregnancy And Lactation Text: This medication is Pregnancy Category B and is considered safe during pregnancy. It is unknown if this medication is excreted in breast milk.
Minoxidil Pregnancy And Lactation Text: This medication has not been assigned a Pregnancy Risk Category but animal studies failed to show danger with the topical medication. It is unknown if the medication is excreted in breast milk.
Tranexamic Acid Counseling:  Patient advised of the small risk of bleeding problems with tranexamic acid. They were also instructed to call if they developed any nausea, vomiting or diarrhea. All of the patient's questions and concerns were addressed.
Niacinamide Pregnancy And Lactation Text: These medications are considered safe during pregnancy.
Tranexamic Acid Pregnancy And Lactation Text: It is unknown if this medication is safe during pregnancy or breast feeding.
Methotrexate Pregnancy And Lactation Text: This medication is Pregnancy Category X and is known to cause fetal harm. This medication is excreted in breast milk.
Infliximab Counseling:  I discussed with the patient the risks of infliximab including but not limited to myelosuppression, immunosuppression, autoimmune hepatitis, demyelinating diseases, lymphoma, and serious infections.  The patient understands that monitoring is required including a PPD at baseline and must alert us or the primary physician if symptoms of infection or other concerning signs are noted.
Azithromycin Counseling:  I discussed with the patient the risks of azithromycin including but not limited to GI upset, allergic reaction, drug rash, diarrhea, and yeast infections.
Zepbound Pregnancy And Lactation Text: The fetal risk of this medication is unknown and taking while pregnant is not recommended. It is unknown if this medication is present in breast milk.
Rifampin Counseling: I discussed with the patient the risks of rifampin including but not limited to liver damage, kidney damage, red-orange body fluids, nausea/vomiting and severe allergy.
Calcipotriene Pregnancy And Lactation Text: The use of this medication during pregnancy or lactation is not recommended as there is insufficient data.
Olumiant Pregnancy And Lactation Text: Based on animal studies, Olumiant may cause embryo-fetal harm when administered to pregnant women.  The medication should not be used in pregnancy.  Breastfeeding is not recommended during treatment.
Taltz Counseling: I discussed with the patient the risks of ixekizumab including but not limited to immunosuppression, serious infections, worsening of inflammatory bowel disease and drug reactions.  The patient understands that monitoring is required including a PPD at baseline and must alert us or the primary physician if symptoms of infection or other concerning signs are noted.
Griseofulvin Pregnancy And Lactation Text: This medication is Pregnancy Category X and is known to cause serious birth defects. It is unknown if this medication is excreted in breast milk but breast feeding should be avoided.
Tazorac Counseling:  Patient advised that medication is irritating and drying.  Patient may need to apply sparingly and wash off after an hour before eventually leaving it on overnight.  The patient verbalized understanding of the proper use and possible adverse effects of tazorac.  All of the patient's questions and concerns were addressed.
Bimzelx Counseling:  I discussed with the patient the risks of Bimzelx including but not limited to depression, immunosuppression, allergic reactions and infections.  The patient understands that monitoring is required including a PPD at baseline and must alert us or the primary physician if symptoms of infection or other concerning signs are noted.
Mirvaso Counseling: Mirvaso is a topical medication which can decrease superficial blood flow where applied. Side effects are uncommon and include stinging, redness and allergic reactions.
Zoryve Counseling:  I discussed with the patient that Zoryve is not for use in the eyes, mouth or vagina. The most commonly reported side effects include diarrhea, headache, insomnia, application site pain, upper respiratory tract infections, and urinary tract infections.  All of the patient's questions and concerns were addressed.
Mirvaso Pregnancy And Lactation Text: This medication has not been assigned a Pregnancy Risk Category. It is unknown if the medication is excreted in breast milk.
Prednisone Counseling:  I discussed with the patient the risks of prolonged use of prednisone including but not limited to weight gain, insomnia, osteoporosis, mood changes, diabetes, susceptibility to infection, glaucoma and high blood pressure.  In cases where prednisone use is prolonged, patients should be monitored with blood pressure checks, serum glucose levels and an eye exam.  Additionally, the patient may need to be placed on GI prophylaxis, PCP prophylaxis, and calcium and vitamin D supplementation and/or a bisphosphonate.  The patient verbalized understanding of the proper use and the possible adverse effects of prednisone.  All of the patient's questions and concerns were addressed.
Valtrex Counseling: I discussed with the patient the risks of valacyclovir including but not limited to kidney damage, nausea, vomiting and severe allergy.  The patient understands that if the infection seems to be worsening or is not improving, they are to call.
Nsaids Counseling: NSAID Counseling: I discussed with the patient that NSAIDs should be taken with food. Prolonged use of NSAIDs can result in the development of stomach ulcers.  Patient advised to stop taking NSAIDs if abdominal pain occurs.  The patient verbalized understanding of the proper use and possible adverse effects of NSAIDs.  All of the patient's questions and concerns were addressed.
Bimzelx Pregnancy And Lactation Text: This medication crosses the placenta and the safety is uncertain during pregnancy. It is unknown if this medication is present in breast milk.
Rinvoq Counseling: I discussed with the patient the risks of Rinvoq therapy including but not limited to upper respiratory tract infections, shingles, cold sores, bronchitis, nausea, cough, fever, acne, and headache. Live vaccines should be avoided.  This medication has been linked to serious infections; higher rate of mortality; malignancy and lymphoproliferative disorders; major adverse cardiovascular events; thrombosis; thrombocytopenia, anemia, and neutropenia; lipid elevations; liver enzyme elevations; and gastrointestinal perforations.
Azithromycin Pregnancy And Lactation Text: This medication is considered safe during pregnancy and is also secreted in breast milk.
Arava Counseling:  Patient counseled regarding adverse effects of Arava including but not limited to nausea, vomiting, abnormalities in liver function tests. Patients may develop mouth sores, rash, diarrhea, and abnormalities in blood counts. The patient understands that monitoring is required including LFTs and blood counts.  There is a rare possibility of scarring of the liver and lung problems that can occur when taking methotrexate. Persistent nausea, loss of appetite, pale stools, dark urine, cough, and shortness of breath should be reported immediately. Patient advised to discontinue Arava treatment and consult with a physician prior to attempting conception. The patient will have to undergo a treatment to eliminate Arava from the body prior to conception.
Cantharidin Counseling:  I discussed with the patient the risks of Cantharidin including but not limited to pain, redness, burning, itching, and blistering.
Rifampin Pregnancy And Lactation Text: This medication is Pregnancy Category C and it isn't know if it is safe during pregnancy. It is also excreted in breast milk and should not be used if you are breast feeding.
Rinvoq Pregnancy And Lactation Text: Based on animal studies, Rinvoq may cause embryo-fetal harm when administered to pregnant women.  The medication should not be used in pregnancy.  Breastfeeding is not recommended during treatment and for 6 days after the last dose.
Sarecycline Counseling: Patient advised regarding possible photosensitivity and discoloration of the teeth, skin, lips, tongue and gums.  Patient instructed to avoid sunlight, if possible.  When exposed to sunlight, patients should wear protective clothing, sunglasses, and sunscreen.  The patient was instructed to call the office immediately if the following severe adverse effects occur:  hearing changes, easy bruising/bleeding, severe headache, or vision changes.  The patient verbalized understanding of the proper use and possible adverse effects of sarecycline.  All of the patient's questions and concerns were addressed.
Tazorac Pregnancy And Lactation Text: This medication is not safe during pregnancy. It is unknown if this medication is excreted in breast milk.
Opzelura Counseling:  I discussed with the patient the risks of Opzelura including but not limited to nasopharngitis, bronchitis, ear infection, eosinophila, hives, diarrhea, folliculitis, tonsillitis, and rhinorrhea.  Taken orally, this medication has been linked to serious infections; higher rate of mortality; malignancy and lymphoproliferative disorders; major adverse cardiovascular events; thrombosis; thrombocytopenia, anemia, and neutropenia; and lipid elevations.
Cimzia Counseling:  I discussed with the patient the risks of Cimzia including but not limited to immunosuppression, allergic reactions and infections.  The patient understands that monitoring is required including a PPD at baseline and must alert us or the primary physician if symptoms of infection or other concerning signs are noted.
Topical Clindamycin Counseling: Patient counseled that this medication may cause skin irritation or allergic reactions.  In the event of skin irritation, the patient was advised to reduce the amount of the drug applied or use it less frequently.   The patient verbalized understanding of the proper use and possible adverse effects of clindamycin.  All of the patient's questions and concerns were addressed.
Tremfya Counseling: I discussed with the patient the risks of guselkumab including but not limited to immunosuppression, serious infections, and drug reactions.  The patient understands that monitoring is required including a PPD at baseline and must alert us or the primary physician if symptoms of infection or other concerning signs are noted.
Bactrim Counseling:  I discussed with the patient the risks of sulfa antibiotics including but not limited to GI upset, allergic reaction, drug rash, diarrhea, dizziness, photosensitivity, and yeast infections.  Rarely, more serious reactions can occur including but not limited to aplastic anemia, agranulocytosis, methemoglobinemia, blood dyscrasias, liver or kidney failure, lung infiltrates or desquamative/blistering drug rashes.
Aklief counseling:  Patient advised to apply a pea-sized amount only at bedtime and wait 30 minutes after washing their face before applying.  If too drying, patient may add a non-comedogenic moisturizer.  The most commonly reported side effects including irritation, redness, scaling, dryness, stinging, burning, itching, and increased risk of sunburn.  The patient verbalized understanding of the proper use and possible adverse effects of retinoids.  All of the patient's questions and concerns were addressed.
5-Fu Counseling: 5-Fluorouracil Counseling:  I discussed with the patient the risks of 5-fluorouracil including but not limited to erythema, scaling, itching, weeping, crusting, and pain.
Nsaids Pregnancy And Lactation Text: These medications are considered safe up to 30 weeks gestation. It is excreted in breast milk.
Nemluvio Counseling: I discussed with the patient the risks of nemolizumab including but not limited to headache, gastrointestinal complaints, nasopharyngitis, musculoskeletal complaints, injection site reactions, and allergic reactions. The patient understands that monitoring is required and they must alert us or the primary physician if any side effects are noted.
Valtrex Pregnancy And Lactation Text: this medication is Pregnancy Category B and is considered safe during pregnancy. This medication is not directly found in breast milk but it's metabolite acyclovir is present.
Cellcept Pregnancy And Lactation Text: This medication is Pregnancy Category D and isn't considered safe during pregnancy. It is unknown if this medication is excreted in breast milk.
Metronidazole Counseling:  I discussed with the patient the risks of metronidazole including but not limited to seizures, nausea/vomiting, a metallic taste in the mouth, nausea/vomiting and severe allergy.
Libtayo Pregnancy And Lactation Text: This medication is contraindicated in pregnancy and when breast feeding.
Wartpeel Counseling:  I discussed with the patient the risks of Wartpeel including but not limited to erythema, scaling, itching, weeping, crusting, and pain.
Finasteride Pregnancy And Lactation Text: This medication is absolutely contraindicated during pregnancy. It is unknown if it is excreted in breast milk.
Glycopyrrolate Pregnancy And Lactation Text: This medication is Pregnancy Category B and is considered safe during pregnancy. It is unknown if it is excreted breast milk.
Terbinafine Counseling: Patient counseling regarding adverse effects of terbinafine including but not limited to headache, diarrhea, rash, upset stomach, liver function test abnormalities, itching, taste/smell disturbance, nausea, abdominal pain, and flatulence.  There is a rare possibility of liver failure that can occur when taking terbinafine.  The patient understands that a baseline LFT and kidney function test may be required. The patient verbalized understanding of the proper use and possible adverse effects of terbinafine.  All of the patient's questions and concerns were addressed.
Skyrizi Counseling: I discussed with the patient the risks of risankizumab-rzaa including but not limited to immunosuppression, and serious infections.  The patient understands that monitoring is required including a PPD at baseline and must alert us or the primary physician if symptoms of infection or other concerning signs are noted.
Isotretinoin Counseling: Patient should get monthly blood tests, not donate blood, not drive at night if vision affected, not share medication, and not undergo elective surgery for 6 months after tx completed. Side effects reviewed, pt to contact office should one occur.
Solaraze Counseling:  I discussed with the patient the risks of Solaraze including but not limited to erythema, scaling, itching, weeping, crusting, and pain.
Humira Counseling:  I discussed with the patient the risks of adalimumab including but not limited to myelosuppression, immunosuppression, autoimmune hepatitis, demyelinating diseases, lymphoma, and serious infections.  The patient understands that monitoring is required including a PPD at baseline and must alert us or the primary physician if symptoms of infection or other concerning signs are noted.
Imiquimod Counseling:  I discussed with the patient the risks of imiquimod including but not limited to erythema, scaling, itching, weeping, crusting, and pain.  Patient understands that the inflammatory response to imiquimod is variable from person to person and was educated regarded proper titration schedule.  If flu-like symptoms develop, patient knows to discontinue the medication and contact us.
Propranolol Pregnancy And Lactation Text: This medication is Pregnancy Category C and it isn't known if it is safe during pregnancy. It is excreted in breast milk.
Cyclophosphamide Counseling:  I discussed with the patient the risks of cyclophosphamide including but not limited to hair loss, hormonal abnormalities, decreased fertility, abdominal pain, diarrhea, nausea and vomiting, bone marrow suppression and infection. The patient understands that monitoring is required while taking this medication.
Birth Control Pills Counseling: Birth Control Pill Counseling: I discussed with the patient the potential side effects of OCPs including but not limited to increased risk of stroke, heart attack, thrombophlebitis, deep venous thrombosis, hepatic adenomas, breast changes, GI upset, headaches, and depression.  The patient verbalized understanding of the proper use and possible adverse effects of OCPs. All of the patient's questions and concerns were addressed.
Semaglutide Counseling: I reviewed the possible side effects including: thyroid tumors, kidney disease, gallbladder disease, abdominal pain, constipation, diarrhea, nausea, vomiting and pancreatitis. Do not take this medication if you have a history or family history of multiple endocrine neoplasia syndrome type 2. Side effects reviewed, pt to contact office should one occur.
Isotretinoin Pregnancy And Lactation Text: This medication is Pregnancy Category X and is considered extremely dangerous during pregnancy. It is unknown if it is excreted in breast milk.
Metronidazole Pregnancy And Lactation Text: This medication is Pregnancy Category B and considered safe during pregnancy.  It is also excreted in breast milk.
Cibinqo Counseling: I discussed with the patient the risks of Cibinqo therapy including but not limited to common cold, nausea, headache, cold sores, increased blood CPK levels, dizziness, UTIs, fatigue, acne, and vomitting. Live vaccines should be avoided.  This medication has been linked to serious infections; higher rate of mortality; malignancy and lymphoproliferative disorders; major adverse cardiovascular events; thrombosis; thrombocytopenia and lymphopenia; lipid elevations; and retinal detachment.
Benzoyl Peroxide Counseling: Patient counseled that medicine may cause skin irritation and bleach clothing.  In the event of skin irritation, the patient was advised to reduce the amount of the drug applied or use it less frequently.   The patient verbalized understanding of the proper use and possible adverse effects of benzoyl peroxide.  All of the patient's questions and concerns were addressed.
Wartpeel Pregnancy And Lactation Text: This medication is Pregnancy Category X and contraindicated in pregnancy and in women who may become pregnant. It is unknown if this medication is excreted in breast milk.
Odomzo Counseling- I discussed with the patient the risks of Odomzo including but not limited to nausea, vomiting, diarrhea, constipation, weight loss, changes in the sense of taste, decreased appetite, muscle spasms, and hair loss.  The patient verbalized understanding of the proper use and possible adverse effects of Odomzo.  All of the patient's questions and concerns were addressed.
Solaraze Pregnancy And Lactation Text: This medication is Pregnancy Category B and is considered safe. There is some data to suggest avoiding during the third trimester. It is unknown if this medication is excreted in breast milk.
Minocycline Counseling: Patient advised regarding possible photosensitivity and discoloration of the teeth, skin, lips, tongue and gums.  Patient instructed to avoid sunlight, if possible.  When exposed to sunlight, patients should wear protective clothing, sunglasses, and sunscreen.  The patient was instructed to call the office immediately if the following severe adverse effects occur:  hearing changes, easy bruising/bleeding, severe headache, or vision changes.  The patient verbalized understanding of the proper use and possible adverse effects of minocycline.  All of the patient's questions and concerns were addressed.
Cibinqo Pregnancy And Lactation Text: It is unknown if this medication will adversely affect pregnancy or breast feeding.  You should not take this medication if you are currently pregnant or planning a pregnancy or while breastfeeding.
Terbinafine Pregnancy And Lactation Text: This medication is Pregnancy Category B and is considered safe during pregnancy. It is also excreted in breast milk and breast feeding isn't recommended.
Hydroxychloroquine Counseling:  I discussed with the patient that a baseline ophthalmologic exam is needed at the start of therapy and every year thereafter while on therapy. A CBC may also be warranted for monitoring.  The side effects of this medication were discussed with the patient, including but not limited to agranulocytosis, aplastic anemia, seizures, rashes, retinopathy, and liver toxicity. Patient instructed to call the office should any adverse effect occur.  The patient verbalized understanding of the proper use and possible adverse effects of Plaquenil.  All the patient's questions and concerns were addressed.
SSKI Counseling:  I discussed with the patient the risks of SSKI including but not limited to thyroid abnormalities, metallic taste, GI upset, fever, headache, acne, arthralgias, paraesthesias, lymphadenopathy, easy bleeding, arrhythmias, and allergic reaction.
Hydroxychloroquine Pregnancy And Lactation Text: This medication has been shown to cause fetal harm but it isn't assigned a Pregnancy Risk Category. There are small amounts excreted in breast milk.
Sski Pregnancy And Lactation Text: This medication is Pregnancy Category D and isn't considered safe during pregnancy. It is excreted in breast milk.
Opioid Counseling: I discussed with the patient the potential side effects of opioids including but not limited to addiction, altered mental status, and depression. I stressed avoiding alcohol, benzodiazepines, muscle relaxants and sleep aids unless specifically okayed by a physician. The patient verbalized understanding of the proper use and possible adverse effects of opioids. All of the patient's questions and concerns were addressed. They were instructed to flush the remaining pills down the toilet if they did not need them for pain.
Cyclophosphamide Pregnancy And Lactation Text: This medication is Pregnancy Category D and it isn't considered safe during pregnancy. This medication is excreted in breast milk.
Klisyri Counseling:  I discussed with the patient the risks of Klisyri including but not limited to erythema, scaling, itching, weeping, crusting, and pain.
Hyrimoz Counseling:  I discussed with the patient the risks of adalimumab including but not limited to myelosuppression, immunosuppression, autoimmune hepatitis, demyelinating diseases, lymphoma, and serious infections.  The patient understands that monitoring is required including a PPD at baseline and must alert us or the primary physician if symptoms of infection or other concerning signs are noted.
Birth Control Pills Pregnancy And Lactation Text: This medication should be avoided if pregnant and for the first 30 days post-partum.
Detail Level: Simple
Benzoyl Peroxide Pregnancy And Lactation Text: This medication is Pregnancy Category C. It is unknown if benzoyl peroxide is excreted in breast milk.
Wegovy Counseling: I reviewed the possible side effects including: thyroid tumors, kidney disease, gallbladder disease, abdominal pain, constipation, diarrhea, nausea, vomiting and pancreatitis. Do not take this medication if you have a history or family history of multiple endocrine neoplasia syndrome type 2. Side effects reviewed, pt to contact office should one occur.
Minocycline Pregnancy And Lactation Text: This medication is Pregnancy Category D and not consider safe during pregnancy. It is also excreted in breast milk.
Litfulo Counseling: I discussed with the patient the risks of Litfulo therapy including but not limited to upper respiratory tract infections, shingles, cold sores, and nausea. Live vaccines should be avoided.  This medication has been linked to serious infections; higher rate of mortality; malignancy and lymphoproliferative disorders; major adverse cardiovascular events; thrombosis; gastrointestinal perforations; neutropenia; lymphopenia; anemia; liver enzyme elevations; and lipid elevations.
Soolantra Counseling: I discussed with the patients the risks of topial Soolantra. This is a medicine which decreases the number of mites and inflammation in the skin. You experience burning, stinging, eye irritation or allergic reactions.  Please call our office if you develop any problems from using this medication.
Spevigo Counseling: I discussed with the patient the risks of Spevigo including but not limited to fatigue, nasuea, vomiting, headache, pruritus, urinary tract infection, an infusion related reactions.  The patient understands that monitoring is required including screening for tuberculosis at baseline and yearly screening thereafter while continuing Spevigo therapy. They should contact us if symptoms of infection or other concerning signs are noted.
Sotyktu Counseling:  I discussed the most common side effects of Sotyktu including: common cold, sore throat, sinus infections, cold sores, canker sores, folliculitis, and acne.  I also discussed more serious side effects of Sotyktu including but not limited to: serious allergic reactions; increased risk for infections such as TB; cancers such as lymphomas; rhabdomyolysis and elevated CPK; and elevated triglycerides and liver enzymes. 
Winlevi Counseling:  I discussed with the patient the risks of topical clascoterone including but not limited to erythema, scaling, itching, and stinging. Patient voiced their understanding.
Klisyri Pregnancy And Lactation Text: It is unknown if this medication can harm a developing fetus or if it is excreted in breast milk.
Spironolactone Counseling: Patient advised regarding risks of diarrhea, abdominal pain, hyperkalemia, birth defects (for female patients), liver toxicity and renal toxicity. The patient may need blood work to monitor liver and kidney function and potassium levels while on therapy. The patient verbalized understanding of the proper use and possible adverse effects of spironolactone.  All of the patient's questions and concerns were addressed.
Fluconazole Counseling:  Patient counseled regarding adverse effects of fluconazole including but not limited to headache, diarrhea, nausea, upset stomach, liver function test abnormalities, taste disturbance, and stomach pain.  There is a rare possibility of liver failure that can occur when taking fluconazole.  The patient understands that monitoring of LFTs and kidney function test may be required, especially at baseline. The patient verbalized understanding of the proper use and possible adverse effects of fluconazole.  All of the patient's questions and concerns were addressed.
Winlevi Pregnancy And Lactation Text: This medication is considered safe during pregnancy and breastfeeding.
Thalidomide Counseling: I discussed with the patient the risks of thalidomide including but not limited to birth defects, anxiety, weakness, chest pain, dizziness, cough and severe allergy.
Spevigo Pregnancy And Lactation Text: The risk during pregnancy and breastfeeding is uncertain with this medication. This medication does cross the placenta. It is unknown if this medication is found in breast milk.
High Dose Vitamin A Counseling: Side effects reviewed, pt to contact office should one occur.
Soolantra Pregnancy And Lactation Text: This medication is Pregnancy Category C. This medication is considered safe during breast feeding.
Carac Counseling:  I discussed with the patient the risks of Carac including but not limited to erythema, scaling, itching, weeping, crusting, and pain.
Cyclosporine Counseling:  I discussed with the patient the risks of cyclosporine including but not limited to hypertension, gingival hyperplasia,myelosuppression, immunosuppression, liver damage, kidney damage, neurotoxicity, lymphoma, and serious infections. The patient understands that monitoring is required including baseline blood pressure, CBC, CMP, lipid panel and uric acid, and then 1-2 times monthly CMP and blood pressure.
Low Dose Naltrexone Counseling- I discussed with the patient the potential risks and side effects of low dose naltrexone including but not limited to: more vivid dreams, headaches, nausea, vomiting, abdominal pain, fatigue, dizziness, and anxiety.
Include Pregnancy/Lactation Warning?: No
Litfulo Pregnancy And Lactation Text: Based on animal studies, Lifulo may cause embryo-fetal harm when administered to pregnant women.  The medication should not be used in pregnancy.  Breastfeeding is not recommended during treatment.
Quinolones Counseling:  I discussed with the patient the risks of fluoroquinolones including but not limited to GI upset, allergic reaction, drug rash, diarrhea, dizziness, photosensitivity, yeast infections, liver function test abnormalities, tendonitis/tendon rupture.
Sotyktu Pregnancy And Lactation Text: There is insufficient data to evaluate whether or not Sotyktu is safe to use during pregnancy.   It is not known if Sotyktu passes into breast milk and whether or not it is safe to use when breastfeeding.  
Dupixent Pregnancy And Lactation Text: This medication likely crosses the placenta but the risk for the fetus is uncertain. This medication is excreted in breast milk.
Otezla Counseling: The side effects of Otezla were discussed with the patient, including but not limited to worsening or new depression, weight loss, diarrhea, nausea, upper respiratory tract infection, and headache. Patient instructed to call the office should any adverse effect occur.  The patient verbalized understanding of the proper use and possible adverse effects of Otezla.  All the patient's questions and concerns were addressed.
Topical Metronidazole Pregnancy And Lactation Text: This medication is Pregnancy Category B and considered safe during pregnancy.  It is also considered safe to use while breastfeeding.
Protopic Pregnancy And Lactation Text: This medication is Pregnancy Category C. It is unknown if this medication is excreted in breast milk when applied topically.
Dutasteride Male Counseling: Dutasteride Counseling:  I discussed with the patient the risks of use of dutasteride including but not limited to decreased libido, decreased ejaculate volume, and gynecomastia. Women who can become pregnant should not handle medication.  All of the patient's questions and concerns were addressed.
Clindamycin Pregnancy And Lactation Text: This medication can be used in pregnancy if certain situations. Clindamycin is also present in breast milk.
Dapsone Counseling: I discussed with the patient the risks of dapsone including but not limited to hemolytic anemia, agranulocytosis, rashes, methemoglobinemia, kidney failure, peripheral neuropathy, headaches, GI upset, and liver toxicity.  Patients who start dapsone require monitoring including baseline LFTs and weekly CBCs for the first month, then every month thereafter.  The patient verbalized understanding of the proper use and possible adverse effects of dapsone.  All of the patient's questions and concerns were addressed.
Eucrisa Counseling: Patient may experience a mild burning sensation during topical application. Eucrisa is not approved in children less than 3 months of age.
Acitretin Counseling:  I discussed with the patient the risks of acitretin including but not limited to hair loss, dry lips/skin/eyes, liver damage, hyperlipidemia, depression/suicidal ideation, photosensitivity.  Serious rare side effects can include but are not limited to pancreatitis, pseudotumor cerebri, bony changes, clot formation/stroke/heart attack.  Patient understands that alcohol is contraindicated since it can result in liver toxicity and significantly prolong the elimination of the drug by many years.
Doxycycline Counseling:  Patient counseled regarding possible photosensitivity and increased risk for sunburn.  Patient instructed to avoid sunlight, if possible.  When exposed to sunlight, patients should wear protective clothing, sunglasses, and sunscreen.  The patient was instructed to call the office immediately if the following severe adverse effects occur:  hearing changes, easy bruising/bleeding, severe headache, or vision changes.  The patient verbalized understanding of the proper use and possible adverse effects of doxycycline.  All of the patient's questions and concerns were addressed.
Itraconazole Counseling:  I discussed with the patient the risks of itraconazole including but not limited to liver damage, nausea/vomiting, neuropathy, and severe allergy.  The patient understands that this medication is best absorbed when taken with acidic beverages such as non-diet cola or ginger ale.  The patient understands that monitoring is required including baseline LFTs and repeat LFTs at intervals.  The patient understands that they are to contact us or the primary physician if concerning signs are noted.
Dutasteride Female Counseling: Dutasteride Counseling:  I discussed with the patient the risks of use of dutasteride including but not limited to decreased libido and sexual dysfunction. Explained the teratogenic nature of the medication and stressed the importance of not getting pregnant during treatment. All of the patient's questions and concerns were addressed.
Doxepin Counseling:  Patient advised that the medication is sedating and not to drive a car after taking this medication. Patient informed of potential adverse effects including but not limited to dry mouth, urinary retention, and blurry vision.  The patient verbalized understanding of the proper use and possible adverse effects of doxepin.  All of the patient's questions and concerns were addressed.
Topical Steroids Counseling: I discussed with the patient that prolonged use of topical steroids can result in the increased appearance of superficial blood vessels (telangiectasias), lightening (hypopigmentation) and thinning of the skin (atrophy).  Patient understands to avoid using high potency steroids in skin folds, the groin or the face.  The patient verbalized understanding of the proper use and possible adverse effects of topical steroids.  All of the patient's questions and concerns were addressed.
Doxepin Pregnancy And Lactation Text: This medication is Pregnancy Category C and it isn't known if it is safe during pregnancy. It is also excreted in breast milk and breast feeding isn't recommended.
Otezla Pregnancy And Lactation Text: This medication is Pregnancy Category C and it isn't known if it is safe during pregnancy. It is unknown if it is excreted in breast milk.
Ebglyss Counseling: I discussed with the patient the risks of lebrikizumab including but not limited to eye inflammation and irritation, cold sores, injection site reactions, allergic reactions and increased risk of parasitic infection. The patient understands that monitoring is required and they must alert us or the primary physician if symptoms of infection or other concerning signs are noted.
Dapsone Pregnancy And Lactation Text: This medication is Pregnancy Category C and is not considered safe during pregnancy or breast feeding.
Simlandi Counseling:  I discussed with the patient the risks of adalimumab including but not limited to myelosuppression, immunosuppression, autoimmune hepatitis, demyelinating diseases, lymphoma, and serious infections.  The patient understands that monitoring is required including a PPD at baseline and must alert us or the primary physician if symptoms of infection or other concerning signs are noted.
Qbrexza Counseling:  I discussed with the patient the risks of Qbrexza including but not limited to headache, mydriasis, blurred vision, dry eyes, nasal dryness, dry mouth, dry throat, dry skin, urinary hesitation, and constipation.  Local skin reactions including erythema, burning, stinging, and itching can also occur.
Opioid Pregnancy And Lactation Text: These medications can lead to premature delivery and should be avoided during pregnancy. These medications are also present in breast milk in small amounts.
Gabapentin Counseling: I discussed with the patient the risks of gabapentin including but not limited to dizziness, somnolence, fatigue and ataxia.
Ebglyss Pregnancy And Lactation Text: This medication likely crosses the placenta but the risk for the fetus is uncertain. It is unknown if this medication is excreted in breast milk.
Acitretin Pregnancy And Lactation Text: This medication is Pregnancy Category X and should not be given to women who are pregnant or may become pregnant in the future. This medication is excreted in breast milk.
Dutasteride Pregnancy And Lactation Text: This medication is absolutely contraindicated in women, especially during pregnancy and breast feeding. Feminization of male fetuses is possible if taking while pregnant.
Doxycycline Pregnancy And Lactation Text: This medication is Pregnancy Category D and not consider safe during pregnancy. It is also excreted in breast milk but is considered safe for shorter treatment courses.
Azathioprine Counseling:  I discussed with the patient the risks of azathioprine including but not limited to myelosuppression, immunosuppression, hepatotoxicity, lymphoma, and infections.  The patient understands that monitoring is required including baseline LFTs, Creatinine, possible TPMP genotyping and weekly CBCs for the first month and then every 2 weeks thereafter.  The patient verbalized understanding of the proper use and possible adverse effects of azathioprine.  All of the patient's questions and concerns were addressed.
Ozempic Counseling: I reviewed the possible side effects including: thyroid tumors, kidney disease, gallbladder disease, abdominal pain, constipation, diarrhea, nausea, vomiting and pancreatitis. Do not take this medication if you have a history or family history of multiple endocrine neoplasia syndrome type 2. Side effects reviewed, pt to contact office should one occur.
Hydroxyzine Counseling: Patient advised that the medication is sedating and not to drive a car after taking this medication.  Patient informed of potential adverse effects including but not limited to dry mouth, urinary retention, and blurry vision.  The patient verbalized understanding of the proper use and possible adverse effects of hydroxyzine.  All of the patient's questions and concerns were addressed.
Erythromycin Counseling:  I discussed with the patient the risks of erythromycin including but not limited to GI upset, allergic reaction, drug rash, diarrhea, increase in liver enzymes, and yeast infections.
Oxybutynin Counseling:  I discussed with the patient the risks of oxybutynin including but not limited to skin rash, drowsiness, dry mouth, difficulty urinating, and blurred vision.
Qbrexza Pregnancy And Lactation Text: There is no available data on Qbrexza use in pregnant women.  There is no available data on Qbrexza use in lactation.
Topical Steroids Applications Pregnancy And Lactation Text: Most topical steroids are considered safe to use during pregnancy and lactation.  Any topical steroid applied to the breast or nipple should be washed off before breastfeeding.
Gabapentin Pregnancy And Lactation Text: This medication is Pregnancy Category C and isn't considered safe during pregnancy. It is excreted in breast milk.
Oxybutynin Pregnancy And Lactation Text: This medication is Pregnancy Category B and is considered safe during pregnancy. It is unknown if it is excreted in breast milk.
Ketoconazole Counseling:   Patient counseled regarding improving absorption with orange juice.  Adverse effects include but are not limited to breast enlargement, headache, diarrhea, nausea, upset stomach, liver function test abnormalities, taste disturbance, and stomach pain.  There is a rare possibility of liver failure that can occur when taking ketoconazole. The patient understands that monitoring of LFTs may be required, especially at baseline. The patient verbalized understanding of the proper use and possible adverse effects of ketoconazole.  All of the patient's questions and concerns were addressed.
Finasteride Male Counseling: Finasteride Counseling:  I discussed with the patient the risks of use of finasteride including but not limited to decreased libido, decreased ejaculate volume, gynecomastia, and depression. Women should not handle medication.  All of the patient's questions and concerns were addressed.
Hydroquinone Counseling:  Patient advised that medication may result in skin irritation, lightening (hypopigmentation), dryness, and burning.  In the event of skin irritation, the patient was advised to reduce the amount of the drug applied or use it less frequently.  Rarely, spots that are treated with hydroquinone can become darker (pseudoochronosis).  Should this occur, patient instructed to stop medication and call the office. The patient verbalized understanding of the proper use and possible adverse effects of hydroquinone.  All of the patient's questions and concerns were addressed.
Enbrel Counseling:  I discussed with the patient the risks of etanercept including but not limited to myelosuppression, immunosuppression, autoimmune hepatitis, demyelinating diseases, lymphoma, and infections.  The patient understands that monitoring is required including a PPD at baseline and must alert us or the primary physician if symptoms of infection or other concerning signs are noted.
Bexarotene Counseling:  I discussed with the patient the risks of bexarotene including but not limited to hair loss, dry lips/skin/eyes, liver abnormalities, hyperlipidemia, pancreatitis, depression/suicidal ideation, photosensitivity, drug rash/allergic reactions, hypothyroidism, anemia, leukopenia, infection, cataracts, and teratogenicity.  Patient understands that they will need regular blood tests to check lipid profile, liver function tests, white blood cell count, thyroid function tests and pregnancy test if applicable.
Saxenda Counseling: I reviewed the possible side effects including: thyroid tumors, kidney disease, gallbladder disease, abdominal pain, constipation, diarrhea, nausea, vomiting and pancreatitis. Do not take this medication if you have a history or family history of multiple endocrine neoplasia syndrome type 2. Side effects reviewed, pt to contact office should one occur.
Bexarotene Pregnancy And Lactation Text: This medication is Pregnancy Category X and should not be given to women who are pregnant or may become pregnant. This medication should not be used if you are breast feeding.
Cellcept Counseling:  I discussed with the patient the risks of mycophenolate mofetil including but not limited to infection/immunosuppression, GI upset, hypokalemia, hypercholesterolemia, bone marrow suppression, lymphoproliferative disorders, malignancy, GI ulceration/bleed/perforation, colitis, interstitial lung disease, kidney failure, progressive multifocal leukoencephalopathy, and birth defects.  The patient understands that monitoring is required including a baseline creatinine and regular CBC testing. In addition, patient must alert us immediately if symptoms of infection or other concerning signs are noted.
Rhofade Counseling: Rhofade is a topical medication which can decrease superficial blood flow where applied. Side effects are uncommon and include stinging, redness and allergic reactions.
Hydroxyzine Pregnancy And Lactation Text: This medication is not safe during pregnancy and should not be taken. It is also excreted in breast milk and breast feeding isn't recommended.
Simponi Counseling:  I discussed with the patient the risks of golimumab including but not limited to myelosuppression, immunosuppression, autoimmune hepatitis, demyelinating diseases, lymphoma, and serious infections.  The patient understands that monitoring is required including a PPD at baseline and must alert us or the primary physician if symptoms of infection or other concerning signs are noted.
Cantharidin Pregnancy And Lactation Text: This medication has not been proven safe during pregnancy. It is unknown if this medication is excreted in breast milk.
Topical Sulfur Applications Counseling: Topical Sulfur Counseling: Patient counseled that this medication may cause skin irritation or allergic reactions.  In the event of skin irritation, the patient was advised to reduce the amount of the drug applied or use it less frequently.   The patient verbalized understanding of the proper use and possible adverse effects of topical sulfur application.  All of the patient's questions and concerns were addressed.
Propranolol Counseling:  I discussed with the patient the risks of propranolol including but not limited to low heart rate, low blood pressure, low blood sugar, restlessness and increased cold sensitivity. They should call the office if they experience any of these side effects.
Topical Sulfur Applications Pregnancy And Lactation Text: This medication is considered safe during pregnancy and breast feeding secondary to limited systemic absorption.
Libtayo Counseling- I discussed with the patient the risks of Libtayo including but not limited to nausea, vomiting, diarrhea, and bone or muscle pain.  The patient verbalized understanding of the proper use and possible adverse effects of Libtayo.  All of the patient's questions and concerns were addressed.
Finasteride Female Counseling: Finasteride Counseling:  I discussed with the patient the risks of use of finasteride including but not limited to decreased libido and sexual dysfunction. Explained the teratogenic nature of the medication and stressed the importance of not getting pregnant during treatment. All of the patient's questions and concerns were addressed.
Erythromycin Pregnancy And Lactation Text: This medication is Pregnancy Category B and is considered safe during pregnancy. It is also excreted in breast milk.
Ketoconazole Pregnancy And Lactation Text: This medication is Pregnancy Category C and it isn't know if it is safe during pregnancy. It is also excreted in breast milk and breast feeding isn't recommended.
Glycopyrrolate Counseling:  I discussed with the patient the risks of glycopyrrolate including but not limited to skin rash, drowsiness, dry mouth, difficulty urinating, and blurred vision.
Bactrim Pregnancy And Lactation Text: This medication is Pregnancy Category D and is known to cause fetal risk.  It is also excreted in breast milk.
Clofazimine Counseling:  I discussed with the patient the risks of clofazimine including but not limited to skin and eye pigmentation, liver damage, nausea/vomiting, gastrointestinal bleeding and allergy.
Xeljanz Counseling: I discussed with the patient the risks of Xeljanz therapy including increased risk of infection, liver issues, headache, diarrhea, or cold symptoms. Live vaccines should be avoided. They were instructed to call if they have any problems.
Zyclara Counseling:  I discussed with the patient the risks of imiquimod including but not limited to erythema, scaling, itching, weeping, crusting, and pain.  Patient understands that the inflammatory response to imiquimod is variable from person to person and was educated regarded proper titration schedule.  If flu-like symptoms develop, patient knows to discontinue the medication and contact us.
Cimzia Pregnancy And Lactation Text: This medication crosses the placenta but can be considered safe in certain situations. Cimzia may be excreted in breast milk.
Albendazole Counseling:  I discussed with the patient the risks of albendazole including but not limited to cytopenia, kidney damage, nausea/vomiting and severe allergy.  The patient understands that this medication is being used in an off-label manner.
Olanzapine Counseling- I discussed with the patient the common side effects of olanzapine including but are not limited to: lack of energy, dry mouth, increased appetite, sleepiness, tremor, constipation, dizziness, changes in behavior, or restlessness.  Explained that teenagers are more likely to experience headaches, abdominal pain, pain in the arms or legs, tiredness, and sleepiness.  Serious side effects include but are not limited: increased risk of death in elderly patients who are confused, have memory loss, or dementia-related psychosis; hyperglycemia; increased cholesterol and triglycerides; and weight gain.
Aklief Pregnancy And Lactation Text: It is unknown if this medication is safe to use during pregnancy.  It is unknown if this medication is excreted in breast milk.  Breastfeeding women should use the topical cream on the smallest area of the skin for the shortest time needed while breastfeeding.  Do not apply to nipple and areola.
Opzelura Pregnancy And Lactation Text: There is insufficient data to evaluate drug-associated risk for major birth defects, miscarriage, or other adverse maternal or fetal outcomes.  There is a pregnancy registry that monitors pregnancy outcomes in pregnant persons exposed to the medication during pregnancy.  It is unknown if this medication is excreted in breast milk.  Do not breastfeed during treatment and for about 4 weeks after the last dose.
Nemluvio Pregnancy And Lactation Text: It is not known if Nemluvio causes fetal harm or is present in breast milk. Please proceed with caution if patients who are pregnant or breastfeeding.
Albendazole Pregnancy And Lactation Text: This medication is Pregnancy Category C and it isn't known if it is safe during pregnancy. It is also excreted in breast milk.
Rituxan Counseling:  I discussed with the patient the risks of Rituxan infusions. Side effects can include infusion reactions, severe drug rashes including mucocutaneous reactions, reactivation of latent hepatitis and other infections and rarely progressive multifocal leukoencephalopathy.  All of the patient's questions and concerns were addressed.
Xelrosez Pregnancy And Lactation Text: This medication is Pregnancy Category D and is not considered safe during pregnancy.  The risk during breast feeding is also uncertain.
Cephalexin Counseling: I counseled the patient regarding use of cephalexin as an antibiotic for prophylactic and/or therapeutic purposes. Cephalexin (commonly prescribed under brand name Keflex) is a cephalosporin antibiotic which is active against numerous classes of bacteria, including most skin bacteria. Side effects may include nausea, diarrhea, gastrointestinal upset, rash, hives, yeast infections, and in rare cases, hepatitis, kidney disease, seizures, fever, confusion, neurologic symptoms, and others. Patients with severe allergies to penicillin medications are cautioned that there is about a 10% incidence of cross-reactivity with cephalosporins. When possible, patients with penicillin allergies should use alternatives to cephalosporins for antibiotic therapy.
Azelaic Acid Counseling: Patient counseled that medicine may cause skin irritation and to avoid applying near the eyes.  In the event of skin irritation, the patient was advised to reduce the amount of the drug applied or use it less frequently.   The patient verbalized understanding of the proper use and possible adverse effects of azelaic acid.  All of the patient's questions and concerns were addressed.
Drysol Counseling:  I discussed with the patient the risks of drysol/aluminum chloride including but not limited to skin rash, itching, irritation, burning.
Tetracycline Counseling: Patient counseled regarding possible photosensitivity and increased risk for sunburn.  Patient instructed to avoid sunlight, if possible.  When exposed to sunlight, patients should wear protective clothing, sunglasses, and sunscreen.  The patient was instructed to call the office immediately if the following severe adverse effects occur:  hearing changes, easy bruising/bleeding, severe headache, or vision changes.  The patient verbalized understanding of the proper use and possible adverse effects of tetracycline.  All of the patient's questions and concerns were addressed. Patient understands to avoid pregnancy while on therapy due to potential birth defects.
Xolair Counseling:  Patient informed of potential adverse effects including but not limited to fever, muscle aches, rash and allergic reactions.  The patient verbalized understanding of the proper use and possible adverse effects of Xolair.  All of the patient's questions and concerns were addressed.
Topical Ketoconazole Counseling: Patient counseled that this medication may cause skin irritation or allergic reactions.  In the event of skin irritation, the patient was advised to reduce the amount of the drug applied or use it less frequently.   The patient verbalized understanding of the proper use and possible adverse effects of ketoconazole.  All of the patient's questions and concerns were addressed.
Cosentyx Counseling:  I discussed with the patient the risks of Cosentyx including but not limited to worsening of Crohn's disease, immunosuppression, allergic reactions and infections.  The patient understands that monitoring is required including a PPD at baseline and must alert us or the primary physician if symptoms of infection or other concerning signs are noted.
Olanzapine Pregnancy And Lactation Text: This medication is pregnancy category C.   There are no adequate and well controlled trials with olanzapine in pregnant females.  Olanzapine should be used during pregnancy only if the potential benefit justifies the potential risk to the fetus.   In a study in lactating healthy women, olanzapine was excreted in breast milk.  It is recommended that women taking olanzapine should not breast feed.
Picato Counseling:  I discussed with the patient the risks of Picato including but not limited to erythema, scaling, itching, weeping, crusting, and pain.
Azelaic Acid Pregnancy And Lactation Text: This medication is considered safe during pregnancy and breast feeding.
Colchicine Counseling:  Patient counseled regarding adverse effects including but not limited to stomach upset (nausea, vomiting, stomach pain, or diarrhea).  Patient instructed to limit alcohol consumption while taking this medication.  Colchicine may reduce blood counts especially with prolonged use.  The patient understands that monitoring of kidney function and blood counts may be required, especially at baseline. The patient verbalized understanding of the proper use and possible adverse effects of colchicine.  All of the patient's questions and concerns were addressed.
Oral Minoxidil Counseling- I discussed with the patient the risks of oral minoxidil including but not limited to shortness of breath, swelling of the feet or ankles, dizziness, lightheadedness, unwanted hair growth and allergic reaction.  The patient verbalized understanding of the proper use and possible adverse effects of oral minoxidil.  All of the patient's questions and concerns were addressed.
Cephalexin Pregnancy And Lactation Text: This medication is Pregnancy Category B and considered safe during pregnancy.  It is also excreted in breast milk but can be used safely for shorter doses.
Ivermectin Counseling:  Patient instructed to take medication on an empty stomach with a full glass of water.  Patient informed of potential adverse effects including but not limited to nausea, diarrhea, dizziness, itching, and swelling of the extremities or lymph nodes.  The patient verbalized understanding of the proper use and possible adverse effects of ivermectin.  All of the patient's questions and concerns were addressed.
Cimetidine Counseling:  I discussed with the patient the risks of Cimetidine including but not limited to gynecomastia, headache, diarrhea, nausea, drowsiness, arrhythmias, pancreatitis, skin rashes, psychosis, bone marrow suppression and kidney toxicity.
Xolair Pregnancy And Lactation Text: This medication is Pregnancy Category B and is considered safe during pregnancy. This medication is excreted in breast milk.
Rituxan Pregnancy And Lactation Text: This medication is Pregnancy Category C and it isn't know if it is safe during pregnancy. It is unknown if this medication is excreted in breast milk but similar antibodies are known to be excreted.
Siliq Counseling:  I discussed with the patient the risks of Siliq including but not limited to new or worsening depression, suicidal thoughts and behavior, immunosuppression, malignancy, posterior leukoencephalopathy syndrome, and serious infections.  The patient understands that monitoring is required including a PPD at baseline and must alert us or the primary physician if symptoms of infection or other concerning signs are noted. There is also a special program designed to monitor depression which is required with Siliq.
Protopic Counseling: Patient may experience a mild burning sensation during topical application. Protopic is not approved in children less than 2 years of age. There have been case reports of hematologic and skin malignancies in patients using topical calcineurin inhibitors although causality is questionable.
Dupixent Counseling: I discussed with the patient the risks of dupilumab including but not limited to eye inflammation and irritation, cold sores, injection site reactions, allergic reactions and increased risk of parasitic infection. The patient understands that monitoring is required and they must alert us or the primary physician if symptoms of infection or other concerning signs are noted.
Topical Metronidazole Counseling: Metronidazole is a topical antibiotic medication. You may experience burning, stinging, redness, or allergic reactions.  Please call our office if you develop any problems from using this medication.
Elidel Counseling: Patient may experience a mild burning sensation during topical application. Elidel is not approved in children less than 2 years of age. There have been case reports of hematologic and skin malignancies in patients using topical calcineurin inhibitors although causality is questionable.
Clindamycin Counseling: I counseled the patient regarding use of clindamycin as an antibiotic for prophylactic and/or therapeutic purposes. Clindamycin is active against numerous classes of bacteria, including skin bacteria. Side effects may include nausea, diarrhea, gastrointestinal upset, rash, hives, yeast infections, and in rare cases, colitis.
Oral Minoxidil Pregnancy And Lactation Text: This medication should only be used when clearly needed if you are pregnant, attempting to become pregnant or breast feeding.

## 2025-05-03 ENCOUNTER — HEALTH MAINTENANCE LETTER (OUTPATIENT)
Age: 80
End: 2025-05-03

## 2025-05-21 ENCOUNTER — TELEPHONE (OUTPATIENT)
Dept: SLEEP MEDICINE | Facility: CLINIC | Age: 80
End: 2025-05-21
Payer: COMMERCIAL

## 2025-05-21 DIAGNOSIS — G47.33 OSA (OBSTRUCTIVE SLEEP APNEA): Primary | ICD-10-CM

## 2025-05-21 NOTE — TELEPHONE ENCOUNTER
General Call    Contacts       Contact Date/Time Type Contact Phone/Fax    05/21/2025 11:54 AM CDT Phone (Incoming) Phyllis Coelhoriy (Self) [phone] (M)          Reason for Call:  pt request     What are your questions or concerns:  PT is having breathing trouble with his cpap and a cough, no openings until Jan     Date of last appointment with provider: 05/29/24    Could we send this information to you in [facility] or would you prefer to receive a phone call?:   Patient would prefer a phone call   Okay to leave a detailed message?: Yes at Cell number on file:    Telephone Information:   Mobile [phone]

## 2025-05-28 NOTE — TELEPHONE ENCOUNTER
Spoke with patient. He reports difficulty breathing with his CPAP due to difficulty getting air in and running out of water in his humidifier. He says this happened for about the last 6 weeks. He saw a sleep MD at [facility] about 2 months ago who changed his CPAP pressure from 6-11 to be 10-15 cm H2O. Martell says he does not know why that change was made. Reviewing data shows his pressure changed on 3/18/25 and at that time his AHI was well controlled (< 1.6).     I told him we could change his pressure settings back and see if that makes improvement as more pressure will use more humidity. There is little to no mask leak or residual apnea on his current settings. Patient is willing to try that.    Of note patient said this weekend he had facial drooping and went to the ER and was found to have Bels Palsy. They did an MRI to rule out stroke. He wants to make sure his sleep health is optimized. Patient has impaired speech right now due to his condition.

## 2025-06-04 ENCOUNTER — RESULTS FOLLOW-UP (OUTPATIENT)
Dept: ONCOLOGY | Facility: CLINIC | Age: 80
End: 2025-06-04

## 2025-06-04 ENCOUNTER — LAB (OUTPATIENT)
Dept: INFUSION THERAPY | Facility: CLINIC | Age: 80
End: 2025-06-04
Attending: INTERNAL MEDICINE
Payer: COMMERCIAL

## 2025-06-04 ENCOUNTER — ONCOLOGY VISIT (OUTPATIENT)
Dept: ONCOLOGY | Facility: CLINIC | Age: 80
End: 2025-06-04
Attending: INTERNAL MEDICINE
Payer: COMMERCIAL

## 2025-06-04 VITALS
HEART RATE: 94 BPM | HEIGHT: 70 IN | OXYGEN SATURATION: 95 % | WEIGHT: 235.5 LBS | DIASTOLIC BLOOD PRESSURE: 80 MMHG | RESPIRATION RATE: 18 BRPM | SYSTOLIC BLOOD PRESSURE: 113 MMHG | BODY MASS INDEX: 33.71 KG/M2

## 2025-06-04 DIAGNOSIS — D50.0 IRON DEFICIENCY ANEMIA DUE TO CHRONIC BLOOD LOSS: ICD-10-CM

## 2025-06-04 DIAGNOSIS — E61.1 IRON DEFICIENCY: ICD-10-CM

## 2025-06-04 DIAGNOSIS — D75.1 POLYCYTHEMIA SECONDARY TO HYPOXIA: ICD-10-CM

## 2025-06-04 DIAGNOSIS — D75.1 POLYCYTHEMIA: Primary | ICD-10-CM

## 2025-06-04 LAB
BASOPHILS # BLD AUTO: 0.1 10E3/UL (ref 0–0.2)
BASOPHILS NFR BLD AUTO: 1 %
EOSINOPHIL # BLD AUTO: 0.2 10E3/UL (ref 0–0.7)
EOSINOPHIL NFR BLD AUTO: 3 %
ERYTHROCYTE [DISTWIDTH] IN BLOOD BY AUTOMATED COUNT: 16.2 % (ref 10–15)
FERRITIN SERPL-MCNC: 37 NG/ML (ref 31–409)
HCT VFR BLD AUTO: 46.7 % (ref 40–53)
HGB BLD-MCNC: 15.1 G/DL (ref 13.3–17.7)
HOLD SPECIMEN: NORMAL
IMM GRANULOCYTES # BLD: 0 10E3/UL
IMM GRANULOCYTES NFR BLD: 0 %
LYMPHOCYTES # BLD AUTO: 2.1 10E3/UL (ref 0.8–5.3)
LYMPHOCYTES NFR BLD AUTO: 26 %
MCH RBC QN AUTO: 27.5 PG (ref 26.5–33)
MCHC RBC AUTO-ENTMCNC: 32.3 G/DL (ref 31.5–36.5)
MCV RBC AUTO: 85 FL (ref 78–100)
MONOCYTES # BLD AUTO: 0.9 10E3/UL (ref 0–1.3)
MONOCYTES NFR BLD AUTO: 11 %
NEUTROPHILS # BLD AUTO: 4.7 10E3/UL (ref 1.6–8.3)
NEUTROPHILS NFR BLD AUTO: 59 %
NRBC # BLD AUTO: 0 10E3/UL
NRBC BLD AUTO-RTO: 0 /100
PLATELET # BLD AUTO: 193 10E3/UL (ref 150–450)
RBC # BLD AUTO: 5.5 10E6/UL (ref 4.4–5.9)
WBC # BLD AUTO: 7.9 10E3/UL (ref 4–11)

## 2025-06-04 PROCEDURE — G0463 HOSPITAL OUTPT CLINIC VISIT: HCPCS | Performed by: INTERNAL MEDICINE

## 2025-06-04 PROCEDURE — G2211 COMPLEX E/M VISIT ADD ON: HCPCS | Performed by: INTERNAL MEDICINE

## 2025-06-04 PROCEDURE — 36415 COLL VENOUS BLD VENIPUNCTURE: CPT

## 2025-06-04 PROCEDURE — 99214 OFFICE O/P EST MOD 30 MIN: CPT | Performed by: INTERNAL MEDICINE

## 2025-06-04 PROCEDURE — 85018 HEMOGLOBIN: CPT

## 2025-06-04 PROCEDURE — 82728 ASSAY OF FERRITIN: CPT

## 2025-06-04 RX ORDER — TELMISARTAN 20 MG/1
20 TABLET ORAL DAILY
COMMUNITY
Start: 2025-06-03

## 2025-06-04 ASSESSMENT — PAIN SCALES - GENERAL: PAINLEVEL_OUTOF10: NO PAIN (0)

## 2025-06-04 NOTE — NURSING NOTE
"Oncology Rooming Note    June 4, 2025 10:06 AM   Martell Coelho is a 79 year old male who presents for:    Chief Complaint   Patient presents with    Oncology Clinic Visit     1 year follow up     Initial Vitals: /80   Pulse 94   Resp 18   Ht 1.778 m (5' 10\")   Wt 106.8 kg (235 lb 8 oz)   SpO2 95%   BMI 33.79 kg/m   Estimated body mass index is 33.79 kg/m  as calculated from the following:    Height as of this encounter: 1.778 m (5' 10\").    Weight as of this encounter: 106.8 kg (235 lb 8 oz). Body surface area is 2.3 meters squared.  No Pain (0) Comment: Data Unavailable   No LMP for male patient.  Allergies reviewed: Yes  Medications reviewed: Yes    Medications: Medication refills not needed today.  Pharmacy name entered into GetFeedback:    [facility]  [id number] [address] [facility]    Frailty Screening:   Is the patient here for a new oncology consult visit in cancer care? 2. No    PHQ9:  Did this patient require a PHQ9?: No      Clinical concerns: No new concerns         Kelli Fitzgerald LPN              "

## 2025-06-04 NOTE — PROGRESS NOTES
"[facility] Hematology / Oncology  Progress Note  Name: Martell Coelho  :  1945  MRN:  [id number]    --------------------    Subjective:  Martell returns for follow-up of polycythemia unaccompanied.  Since our last visit, Martell has done well from an overall health standpoint.  He had his left hip replaced in 2024.  He continues with monthly phlebotomy through his primary care provider Dr. Johnson.  He unfortunately developed Bell's palsy affecting the right side of his face; emergency room visit ruled out stroke.  He completed a course of prednisone and is starting antiviral therapy.    -------------------    Objective:  VS: /80   Pulse 94   Resp 18   Ht 1.778 m (5' 10\")   Wt 106.8 kg (235 lb 8 oz)   SpO2 95%   BMI 33.79 kg/m    Gen: Well-appearing.    We reviewed CBC, ferritin.    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  No improvement w/ sleep apnea therapies.  Negative congenital polycythemia testing.    Continue monthly CBC check sections to keep hematocrit less than 48.  Suspect that his recent left hip replacement contributed to blood loss and iron deficiency, helping control his hematocrit without phlebotomy.  Continue aspirin 81 mg indefinitely contingent on no GI bleeding episodes.  No indication for bone marrow biopsy or initiation of hydroxyurea at this time.  Continue supportive cares for sleep apnea.  Appreciates annual follow-up.    There are no Patient Instructions on file for this visit.  "

## 2025-06-04 NOTE — LETTER
"2025      Martell Coelho  [address] [address]      Dear Colleague,    Thank you for referring your patient, Martell Coelho, to the [facility] [place]. Please see a copy of my visit note below.    [facility] Hematology / Oncology  Progress Note  Name: Martell Coelho  :  1945  MRN:  [id number]    --------------------    Subjective:  Martell returns for follow-up of polycythemia unaccompanied.  Since our last visit, Martell has done well from an overall health standpoint.  He had his left hip replaced in 2024.  He continues with monthly phlebotomy through his primary care provider Dr. Johnson.  He unfortunately developed Bell's palsy affecting the right side of his face; emergency room visit ruled out stroke.  He completed a course of prednisone and is starting antiviral therapy.    -------------------    Objective:  VS: /80   Pulse 94   Resp 18   Ht 1.778 m (5' 10\")   Wt 106.8 kg (235 lb 8 oz)   SpO2 95%   BMI 33.79 kg/m    Gen: Well-appearing.    We reviewed CBC, ferritin.    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  No improvement w/ sleep apnea therapies.  Negative congenital polycythemia testing.    Continue monthly CBC check sections to keep hematocrit less than 48.  Suspect that his recent left hip replacement contributed to blood loss and iron deficiency, helping control his hematocrit without phlebotomy.  Continue aspirin 81 mg indefinitely contingent on no GI bleeding episodes.  No indication for bone marrow biopsy or initiation of hydroxyurea at this time.  Continue supportive cares for sleep apnea.  Appreciates annual follow-up.    There are no Patient Instructions on file for this visit.    Again, thank you for allowing me to participate in the care of your patient.        Sincerely,        Migel Shaw MD    Electronically signed"

## 2025-06-24 ENCOUNTER — APPOINTMENT (OUTPATIENT)
Dept: URBAN - METROPOLITAN AREA CLINIC 257 | Age: 80
Setting detail: DERMATOLOGY
End: 2025-06-25

## 2025-06-24 DIAGNOSIS — L57.8 OTHER SKIN CHANGES DUE TO CHRONIC EXPOSURE TO NONIONIZING RADIATION: ICD-10-CM

## 2025-06-24 DIAGNOSIS — T07XXXA ABRASION OR FRICTION BURN OF OTHER, MULTIPLE, AND UNSPECIFIED SITES, WITHOUT MENTION OF INFECTION: ICD-10-CM

## 2025-06-24 PROBLEM — S80.812A ABRASION, LEFT LOWER LEG, INITIAL ENCOUNTER: Status: ACTIVE | Noted: 2025-06-24

## 2025-06-24 PROCEDURE — 99212 OFFICE O/P EST SF 10 MIN: CPT

## 2025-06-24 PROCEDURE — OTHER COUNSELING: OTHER

## 2025-06-24 ASSESSMENT — LOCATION DETAILED DESCRIPTION DERM
LOCATION DETAILED: LEFT DISTAL CALF
LOCATION DETAILED: RIGHT PROXIMAL CALF
LOCATION DETAILED: LEFT PROXIMAL CALF

## 2025-06-24 ASSESSMENT — LOCATION SIMPLE DESCRIPTION DERM
LOCATION SIMPLE: RIGHT CALF
LOCATION SIMPLE: LEFT CALF

## 2025-06-24 ASSESSMENT — LOCATION ZONE DERM: LOCATION ZONE: LEG
